# Patient Record
Sex: MALE | Race: OTHER | HISPANIC OR LATINO | URBAN - METROPOLITAN AREA
[De-identification: names, ages, dates, MRNs, and addresses within clinical notes are randomized per-mention and may not be internally consistent; named-entity substitution may affect disease eponyms.]

---

## 2017-04-24 ENCOUNTER — EMERGENCY (EMERGENCY)
Facility: HOSPITAL | Age: 67
LOS: 1 days | Discharge: ANOTHER HOSPITAL | End: 2017-04-24
Attending: EMERGENCY MEDICINE
Payer: MEDICARE

## 2017-04-24 VITALS
HEIGHT: 65 IN | SYSTOLIC BLOOD PRESSURE: 113 MMHG | RESPIRATION RATE: 20 BRPM | WEIGHT: 175.05 LBS | TEMPERATURE: 98 F | DIASTOLIC BLOOD PRESSURE: 69 MMHG | OXYGEN SATURATION: 98 % | HEART RATE: 110 BPM

## 2017-04-24 PROCEDURE — 99291 CRITICAL CARE FIRST HOUR: CPT

## 2017-04-24 PROCEDURE — 70450 CT HEAD/BRAIN W/O DYE: CPT | Mod: 26

## 2017-04-24 PROCEDURE — 72125 CT NECK SPINE W/O DYE: CPT | Mod: 26

## 2017-04-24 NOTE — ED PROVIDER NOTE - CRITICAL CARE PROVIDED
consult w/ pt's family directly relating to pts condition/consultation with other physicians/direct patient care (not related to procedure)/interpretation of diagnostic studies/documentation/additional history taking

## 2017-04-24 NOTE — ED PROVIDER NOTE - MEDICAL DECISION MAKING DETAILS
61 y/o with history of HTN, HLD presenting with head injury and LOC earlier today. Pt is well besides headache. Given age and mechanism of fall, will CT-scan head and re-assess. Pt is not on anticoagulation.

## 2017-04-24 NOTE — ED PROVIDER NOTE - OBJECTIVE STATEMENT
65 y/o M with PMHx of HLD, HTN presents to ED c/o headache s/p slip and fall down 7 steps. Pt confirms hitting the back of his head, notes pain to touch on back of head only and neck pain. Pt lost consciousness, and arose when wife splashed with water. Pt's wife attended to him right away when she heard him fall from afar. Pt was able to get up when assisted by wife  Denies nausea, vomiting or any other complaints. Pt takes 81mg of aspirin daily. NKDA. 65 y/o M with PMHx of HLD, HTN presents to ED c/o headache s/p slip and fall down 7 steps. Pt confirms hitting the back of his head, notes pain to touch on back of head only and neck pain. Pt lost consciousness, and arose when wife splashed with water. Pt's wife attended to him right away when she heard him fall from afar. Pt was able to get up when assisted by wife. Is ambulating in ED.  Denies nausea, vomiting or any other complaints. Pt takes 81mg of aspirin daily but no other blood thinners. NKDA.    Pt speaks limited English, understands that we offered interpretation services and stated his preference, in English, for his adult son to interpret for him.

## 2017-04-24 NOTE — ED PROVIDER NOTE - MUSCULOSKELETAL, MLM
Spine appears normal, range of motion is not limited, no muscle or joint tenderness. No c-spine tenderness

## 2017-04-24 NOTE — ED ADULT TRIAGE NOTE - CHIEF COMPLAINT QUOTE
pt tripped and fell down the stairs this afternoon at 230pm, positive LOC and swelling noted in back of head, pt presently ambulating steadily a&ox3

## 2017-04-24 NOTE — ED PROVIDER NOTE - PROGRESS NOTE DETAILS
Transfer to [place] for head bleed. On phone with [facility] center for Transfer to [place] for head bleed. Spoke c Dr. Cardenas and Dr. Graciela Rodríguez. Dx d/w pt via son, expressed understanding and consenting to [facility],.

## 2017-04-24 NOTE — ED PROVIDER NOTE - CARE PLAN
Principal Discharge DX:	Subarachnoid bleed  Secondary Diagnosis:	Subdural bleeding  Secondary Diagnosis:	Brain contusion, with LOC of 30 min or less, initial encounter

## 2017-04-25 ENCOUNTER — INPATIENT (INPATIENT)
Facility: HOSPITAL | Age: 67
LOS: 0 days | Discharge: HOME | DRG: 66 | End: 2017-04-26
Attending: SURGERY | Admitting: SURGERY
Payer: MEDICARE

## 2017-04-25 VITALS
TEMPERATURE: 99 F | HEART RATE: 101 BPM | RESPIRATION RATE: 18 BRPM | SYSTOLIC BLOOD PRESSURE: 150 MMHG | OXYGEN SATURATION: 95 % | DIASTOLIC BLOOD PRESSURE: 82 MMHG

## 2017-04-25 VITALS
SYSTOLIC BLOOD PRESSURE: 136 MMHG | TEMPERATURE: 99 F | HEART RATE: 109 BPM | DIASTOLIC BLOOD PRESSURE: 59 MMHG | OXYGEN SATURATION: 99 % | RESPIRATION RATE: 18 BRPM

## 2017-04-25 DIAGNOSIS — I62.00 NONTRAUMATIC SUBDURAL HEMORRHAGE, UNSPECIFIED: ICD-10-CM

## 2017-04-25 LAB
ALBUMIN SERPL ELPH-MCNC: 3.5 G/DL — SIGNIFICANT CHANGE UP (ref 3.5–5)
ALBUMIN SERPL ELPH-MCNC: 4 G/DL — SIGNIFICANT CHANGE UP (ref 3.3–5)
ALP SERPL-CCNC: 53 U/L — SIGNIFICANT CHANGE UP (ref 40–120)
ALP SERPL-CCNC: 59 U/L — SIGNIFICANT CHANGE UP (ref 40–120)
ALT FLD-CCNC: 15 U/L RC — SIGNIFICANT CHANGE UP (ref 10–45)
ALT FLD-CCNC: 23 U/L DA — SIGNIFICANT CHANGE UP (ref 10–60)
ANION GAP SERPL CALC-SCNC: 12 MMOL/L — SIGNIFICANT CHANGE UP (ref 5–17)
ANION GAP SERPL CALC-SCNC: 23 MMOL/L — HIGH (ref 5–17)
APTT BLD: 30.5 SEC — SIGNIFICANT CHANGE UP (ref 27.5–37.4)
APTT BLD: 31.7 SEC — SIGNIFICANT CHANGE UP (ref 27.5–37.4)
AST SERPL-CCNC: 24 U/L — SIGNIFICANT CHANGE UP (ref 10–40)
AST SERPL-CCNC: 24 U/L — SIGNIFICANT CHANGE UP (ref 10–40)
BASOPHILS # BLD AUTO: 0.1 K/UL — SIGNIFICANT CHANGE UP (ref 0–0.2)
BASOPHILS # BLD AUTO: 0.1 K/UL — SIGNIFICANT CHANGE UP (ref 0–0.2)
BASOPHILS NFR BLD AUTO: 0.6 % — SIGNIFICANT CHANGE UP (ref 0–2)
BASOPHILS NFR BLD AUTO: 0.7 % — SIGNIFICANT CHANGE UP (ref 0–2)
BILIRUB SERPL-MCNC: 0.4 MG/DL — SIGNIFICANT CHANGE UP (ref 0.2–1.2)
BILIRUB SERPL-MCNC: 0.5 MG/DL — SIGNIFICANT CHANGE UP (ref 0.2–1.2)
BLD GP AB SCN SERPL QL: NEGATIVE — SIGNIFICANT CHANGE UP
BUN SERPL-MCNC: 16 MG/DL — SIGNIFICANT CHANGE UP (ref 7–18)
BUN SERPL-MCNC: 16 MG/DL — SIGNIFICANT CHANGE UP (ref 7–23)
CALCIUM SERPL-MCNC: 8.2 MG/DL — LOW (ref 8.4–10.5)
CALCIUM SERPL-MCNC: 8.9 MG/DL — SIGNIFICANT CHANGE UP (ref 8.4–10.5)
CHLORIDE SERPL-SCNC: 101 MMOL/L — SIGNIFICANT CHANGE UP (ref 96–108)
CHLORIDE SERPL-SCNC: 106 MMOL/L — SIGNIFICANT CHANGE UP (ref 96–108)
CO2 SERPL-SCNC: 21 MMOL/L — LOW (ref 22–31)
CO2 SERPL-SCNC: 24 MMOL/L — SIGNIFICANT CHANGE UP (ref 22–31)
CREAT SERPL-MCNC: 1 MG/DL — SIGNIFICANT CHANGE UP (ref 0.5–1.3)
CREAT SERPL-MCNC: 1.29 MG/DL — SIGNIFICANT CHANGE UP (ref 0.5–1.3)
EOSINOPHIL # BLD AUTO: 0 K/UL — SIGNIFICANT CHANGE UP (ref 0–0.5)
EOSINOPHIL # BLD AUTO: 0 K/UL — SIGNIFICANT CHANGE UP (ref 0–0.5)
EOSINOPHIL NFR BLD AUTO: 0 % — SIGNIFICANT CHANGE UP (ref 0–6)
EOSINOPHIL NFR BLD AUTO: 0.1 % — SIGNIFICANT CHANGE UP (ref 0–6)
GLUCOSE SERPL-MCNC: 163 MG/DL — HIGH (ref 70–99)
GLUCOSE SERPL-MCNC: 178 MG/DL — HIGH (ref 70–99)
HCT VFR BLD CALC: 41.4 % — SIGNIFICANT CHANGE UP (ref 39–50)
HCT VFR BLD CALC: 44.9 % — SIGNIFICANT CHANGE UP (ref 39–50)
HGB BLD-MCNC: 14.3 G/DL — SIGNIFICANT CHANGE UP (ref 13–17)
HGB BLD-MCNC: 15.3 G/DL — SIGNIFICANT CHANGE UP (ref 13–17)
INR BLD: 1.07 RATIO — SIGNIFICANT CHANGE UP (ref 0.88–1.16)
INR BLD: 1.09 RATIO — SIGNIFICANT CHANGE UP (ref 0.88–1.16)
LYMPHOCYTES # BLD AUTO: 1.3 K/UL — SIGNIFICANT CHANGE UP (ref 1–3.3)
LYMPHOCYTES # BLD AUTO: 1.6 K/UL — SIGNIFICANT CHANGE UP (ref 1–3.3)
LYMPHOCYTES # BLD AUTO: 12.3 % — LOW (ref 13–44)
LYMPHOCYTES # BLD AUTO: 15.5 % — SIGNIFICANT CHANGE UP (ref 13–44)
MCHC RBC-ENTMCNC: 29.6 PG — SIGNIFICANT CHANGE UP (ref 27–34)
MCHC RBC-ENTMCNC: 29.9 PG — SIGNIFICANT CHANGE UP (ref 27–34)
MCHC RBC-ENTMCNC: 34 GM/DL — SIGNIFICANT CHANGE UP (ref 32–36)
MCHC RBC-ENTMCNC: 34.5 GM/DL — SIGNIFICANT CHANGE UP (ref 32–36)
MCV RBC AUTO: 86.6 FL — SIGNIFICANT CHANGE UP (ref 80–100)
MCV RBC AUTO: 87.2 FL — SIGNIFICANT CHANGE UP (ref 80–100)
MONOCYTES # BLD AUTO: 0.5 K/UL — SIGNIFICANT CHANGE UP (ref 0–0.9)
MONOCYTES # BLD AUTO: 0.6 K/UL — SIGNIFICANT CHANGE UP (ref 0–0.9)
MONOCYTES NFR BLD AUTO: 4.8 % — SIGNIFICANT CHANGE UP (ref 2–14)
MONOCYTES NFR BLD AUTO: 6.2 % — SIGNIFICANT CHANGE UP (ref 2–14)
NEUTROPHILS # BLD AUTO: 7.9 K/UL — HIGH (ref 1.8–7.4)
NEUTROPHILS # BLD AUTO: 8.8 K/UL — HIGH (ref 1.8–7.4)
NEUTROPHILS NFR BLD AUTO: 77.7 % — HIGH (ref 43–77)
NEUTROPHILS NFR BLD AUTO: 82.1 % — HIGH (ref 43–77)
PLATELET # BLD AUTO: 162 K/UL — SIGNIFICANT CHANGE UP (ref 150–400)
PLATELET # BLD AUTO: 195 K/UL — SIGNIFICANT CHANGE UP (ref 150–400)
POTASSIUM SERPL-MCNC: 3.9 MMOL/L — SIGNIFICANT CHANGE UP (ref 3.5–5.3)
POTASSIUM SERPL-MCNC: 3.9 MMOL/L — SIGNIFICANT CHANGE UP (ref 3.5–5.3)
POTASSIUM SERPL-SCNC: 3.9 MMOL/L — SIGNIFICANT CHANGE UP (ref 3.5–5.3)
POTASSIUM SERPL-SCNC: 3.9 MMOL/L — SIGNIFICANT CHANGE UP (ref 3.5–5.3)
PROT SERPL-MCNC: 7.2 G/DL — SIGNIFICANT CHANGE UP (ref 6–8.3)
PROT SERPL-MCNC: 7.5 G/DL — SIGNIFICANT CHANGE UP (ref 6–8.3)
PROTHROM AB SERPL-ACNC: 11.7 SEC — SIGNIFICANT CHANGE UP (ref 9.8–12.7)
PROTHROM AB SERPL-ACNC: 11.9 SEC — SIGNIFICANT CHANGE UP (ref 9.8–12.7)
RBC # BLD: 4.78 M/UL — SIGNIFICANT CHANGE UP (ref 4.2–5.8)
RBC # BLD: 5.15 M/UL — SIGNIFICANT CHANGE UP (ref 4.2–5.8)
RBC # FLD: 11.7 % — SIGNIFICANT CHANGE UP (ref 10.3–14.5)
RBC # FLD: 11.8 % — SIGNIFICANT CHANGE UP (ref 10.3–14.5)
RH IG SCN BLD-IMP: POSITIVE — SIGNIFICANT CHANGE UP
SODIUM SERPL-SCNC: 142 MMOL/L — SIGNIFICANT CHANGE UP (ref 135–145)
SODIUM SERPL-SCNC: 145 MMOL/L — SIGNIFICANT CHANGE UP (ref 135–145)
WBC # BLD: 10.1 K/UL — SIGNIFICANT CHANGE UP (ref 3.8–10.5)
WBC # BLD: 10.7 K/UL — HIGH (ref 3.8–10.5)
WBC # FLD AUTO: 10.1 K/UL — SIGNIFICANT CHANGE UP (ref 3.8–10.5)
WBC # FLD AUTO: 10.7 K/UL — HIGH (ref 3.8–10.5)

## 2017-04-25 PROCEDURE — 70450 CT HEAD/BRAIN W/O DYE: CPT | Mod: 26,77

## 2017-04-25 PROCEDURE — 99285 EMERGENCY DEPT VISIT HI MDM: CPT | Mod: 25

## 2017-04-25 PROCEDURE — 86900 BLOOD TYPING SEROLOGIC ABO: CPT

## 2017-04-25 PROCEDURE — 99053 MED SERV 10PM-8AM 24 HR FAC: CPT

## 2017-04-25 PROCEDURE — 99291 CRITICAL CARE FIRST HOUR: CPT | Mod: 25

## 2017-04-25 PROCEDURE — 80053 COMPREHEN METABOLIC PANEL: CPT

## 2017-04-25 PROCEDURE — 86850 RBC ANTIBODY SCREEN: CPT

## 2017-04-25 PROCEDURE — 85730 THROMBOPLASTIN TIME PARTIAL: CPT

## 2017-04-25 PROCEDURE — 86901 BLOOD TYPING SEROLOGIC RH(D): CPT

## 2017-04-25 PROCEDURE — 70450 CT HEAD/BRAIN W/O DYE: CPT

## 2017-04-25 PROCEDURE — 85027 COMPLETE CBC AUTOMATED: CPT

## 2017-04-25 PROCEDURE — 85610 PROTHROMBIN TIME: CPT

## 2017-04-25 PROCEDURE — 72125 CT NECK SPINE W/O DYE: CPT

## 2017-04-25 PROCEDURE — 70450 CT HEAD/BRAIN W/O DYE: CPT | Mod: 26

## 2017-04-25 RX ORDER — ACETAMINOPHEN 500 MG
650 TABLET ORAL ONCE
Qty: 0 | Refills: 0 | Status: COMPLETED | OUTPATIENT
Start: 2017-04-25 | End: 2017-04-25

## 2017-04-25 RX ORDER — INSULIN LISPRO 100/ML
VIAL (ML) SUBCUTANEOUS
Qty: 0 | Refills: 0 | Status: DISCONTINUED | OUTPATIENT
Start: 2017-04-25 | End: 2017-04-26

## 2017-04-25 RX ADMIN — Medication 650 MILLIGRAM(S): at 23:01

## 2017-04-25 RX ADMIN — Medication 650 MILLIGRAM(S): at 14:09

## 2017-04-25 NOTE — ED ADULT NURSE NOTE - ED STAT RN HANDOFF DETAILS
pt.is alert and  oriented x 3 denies  pain.  transfer to [facility] ER for  subdural  hematoma.martinez to  left  ac#[id number]  intact.left  in the ed  in stable  condition via  [facility].not   in  distress

## 2017-04-25 NOTE — ED PROVIDER NOTE - PROGRESS NOTE DETAILS
Attending Marcos: d/w neurosurgery paresh witt Attending Marcos: d/w neurosurgery recommend 12 hour repeat scan

## 2017-04-25 NOTE — ED PROVIDER NOTE - OBJECTIVE STATEMENT
Attending Rodríguez: 67 y/o male transferred from [facility] after mechanical fall. pt tripped down some stairs and landed on his back. +LOC. pt on aspirin. event happened at approximately 2pm. went to [place] where CT head showed multiple small subdural. pt complaining of headache and dizziness. no abdominal pain, sob or difficulty breathing. no blurry vision

## 2017-04-25 NOTE — ED ADULT NURSE NOTE - OBJECTIVE STATEMENT
65 y/o male A&O x 3 transferred from [place] s/p mechanical fall down 7 stairs this afternoon. Pt states he tripped and fell down the stairs. P wife reports +LOC for < 2 minutes and awoke after splashing water to his face. Pt confirmed multiple subdurals. Pt lungs clear bilaterally. Skin warm, dry. PERRL. No neuro deficits noted. Abdomen non-distended, non-tender. Gross motor intact.

## 2017-04-25 NOTE — ED ADULT NURSE REASSESSMENT NOTE - NS ED NURSE REASSESS COMMENT FT1
patient is resting in the room waiting for ct scan. perrl. equal strength and sensation in all extremities. patient denies any complaints. vss/nad. will continue to monitor.

## 2017-04-25 NOTE — ED PROVIDER NOTE - MEDICAL DECISION MAKING DETAILS
Attending Marcos: 67 y/o male on aspirin transferred after fall with subdural. pt awake and alert. will monitor BP, d/w neurosurgery and re-eval.

## 2017-04-25 NOTE — ED ADULT NURSE NOTE - OBJECTIVE STATEMENT
presented to ed with s/p fall pt.slipped and fall down 7 steps onset yesterday at 0230.c/o swelling at  the back of his head..+ LOC.c/o headache.ct head done  result small subarachnoid hemorrhage in the rt.frontal area and  left parietal..bld.drawn and  sent to lab

## 2017-04-25 NOTE — ED PROVIDER NOTE - PHYSICAL EXAMINATION
Attending Rodríguez: Gen: NAD, heent: posterior hematoma, eomi, perrla, mmm, op pink, uvula midline, neck; nttp, no nuchal rigidity, chest: nttp, no crepitus, cv: rrr, no murmurs, lungs: ctab, abd: soft, nontender, nondistended, no peritoneal signs, +BS, no guarding, ext: wwp, neg homans, skin: no rash, neuro: awake and alert, following commands, speech clear, sensation and strength intact, no focal deficits

## 2017-04-25 NOTE — H&P ADULT. - HISTORY OF PRESENT ILLNESS
66M s/p trip down 4 steps, fell on back and hit head. States he lost balance. +LOC. On aspirin at home. CT Head/c-spine significant for subdural/subarachnoid hemorrhage. Repeat head CT 4 hours later stable.

## 2017-04-26 VITALS
HEART RATE: 77 BPM | OXYGEN SATURATION: 98 % | DIASTOLIC BLOOD PRESSURE: 87 MMHG | TEMPERATURE: 98 F | RESPIRATION RATE: 18 BRPM | SYSTOLIC BLOOD PRESSURE: 158 MMHG

## 2017-04-26 LAB
ANION GAP SERPL CALC-SCNC: 17 MMOL/L — SIGNIFICANT CHANGE UP (ref 5–17)
BUN SERPL-MCNC: 13 MG/DL — SIGNIFICANT CHANGE UP (ref 7–23)
CALCIUM SERPL-MCNC: 8.7 MG/DL — SIGNIFICANT CHANGE UP (ref 8.4–10.5)
CHLORIDE SERPL-SCNC: 98 MMOL/L — SIGNIFICANT CHANGE UP (ref 96–108)
CO2 SERPL-SCNC: 24 MMOL/L — SIGNIFICANT CHANGE UP (ref 22–31)
CREAT SERPL-MCNC: 0.84 MG/DL — SIGNIFICANT CHANGE UP (ref 0.5–1.3)
GLUCOSE SERPL-MCNC: 123 MG/DL — HIGH (ref 70–99)
HCT VFR BLD CALC: 39.4 % — SIGNIFICANT CHANGE UP (ref 39–50)
HGB BLD-MCNC: 13.3 G/DL — SIGNIFICANT CHANGE UP (ref 13–17)
MCHC RBC-ENTMCNC: 29 PG — SIGNIFICANT CHANGE UP (ref 27–34)
MCHC RBC-ENTMCNC: 33.8 GM/DL — SIGNIFICANT CHANGE UP (ref 32–36)
MCV RBC AUTO: 86 FL — SIGNIFICANT CHANGE UP (ref 80–100)
PLATELET # BLD AUTO: 154 K/UL — SIGNIFICANT CHANGE UP (ref 150–400)
POTASSIUM SERPL-MCNC: 3.3 MMOL/L — LOW (ref 3.5–5.3)
POTASSIUM SERPL-SCNC: 3.3 MMOL/L — LOW (ref 3.5–5.3)
RBC # BLD: 4.58 M/UL — SIGNIFICANT CHANGE UP (ref 4.2–5.8)
RBC # FLD: 12.4 % — SIGNIFICANT CHANGE UP (ref 10.3–14.5)
SODIUM SERPL-SCNC: 139 MMOL/L — SIGNIFICANT CHANGE UP (ref 135–145)
WBC # BLD: 7.04 K/UL — SIGNIFICANT CHANGE UP (ref 3.8–10.5)
WBC # FLD AUTO: 7.04 K/UL — SIGNIFICANT CHANGE UP (ref 3.8–10.5)

## 2017-04-26 PROCEDURE — 80048 BASIC METABOLIC PNL TOTAL CA: CPT

## 2017-04-26 PROCEDURE — 85027 COMPLETE CBC AUTOMATED: CPT

## 2017-04-26 PROCEDURE — 85730 THROMBOPLASTIN TIME PARTIAL: CPT

## 2017-04-26 PROCEDURE — 86850 RBC ANTIBODY SCREEN: CPT

## 2017-04-26 PROCEDURE — 86901 BLOOD TYPING SEROLOGIC RH(D): CPT

## 2017-04-26 PROCEDURE — 80053 COMPREHEN METABOLIC PANEL: CPT

## 2017-04-26 PROCEDURE — 99285 EMERGENCY DEPT VISIT HI MDM: CPT | Mod: 25

## 2017-04-26 PROCEDURE — 85610 PROTHROMBIN TIME: CPT

## 2017-04-26 PROCEDURE — 86900 BLOOD TYPING SEROLOGIC ABO: CPT

## 2017-04-26 PROCEDURE — 70450 CT HEAD/BRAIN W/O DYE: CPT

## 2017-04-26 RX ORDER — POTASSIUM CHLORIDE 20 MEQ
40 PACKET (EA) ORAL ONCE
Qty: 0 | Refills: 0 | Status: COMPLETED | OUTPATIENT
Start: 2017-04-26 | End: 2017-04-26

## 2017-04-26 RX ORDER — ACETAMINOPHEN 500 MG
650 TABLET ORAL ONCE
Qty: 0 | Refills: 0 | Status: COMPLETED | OUTPATIENT
Start: 2017-04-26 | End: 2017-04-26

## 2017-04-26 RX ADMIN — Medication 650 MILLIGRAM(S): at 07:51

## 2017-04-26 RX ADMIN — Medication 40 MILLIEQUIVALENT(S): at 11:32

## 2017-04-26 RX ADMIN — Medication 650 MILLIGRAM(S): at 10:11

## 2017-04-26 RX ADMIN — Medication 650 MILLIGRAM(S): at 10:57

## 2017-04-26 NOTE — DISCHARGE NOTE ADULT - PLAN OF CARE
Return to normal ADLs with adequate pain control FOLLOW-UP:  1. Please call to make a follow-up appointment within two weeks of discharge with Dr. Goins.  2. Please follow up with your primary care physician in one week regarding your hospitalization.  3. Please call the trauma office if you have any questions or concerns about your hospitalization Dr. Silva [phone]  DIET: Return to your usual diet.  WOUND CARE:   BATHING: You may shower and/or sponge bathe.-Do not submerge wound underwate or scrub incision sites. Let soapy water run over them and pat dry.  Leave the white steri strips in place, they will fall off on their own in approximately 5-7 days.  ACTIVITY: No straining or heavy lifting over 15 pounds for the next two weeks. Otherwise, you may return to your usual level of physical activity. If you are taking narcotic pain medication (such as Percocet), do NOT drive a car, operate machinery or make important decisions.    NOTIFY YOUR SURGEON IF: You have any bleeding that does not stop, any pus draining from your wound, any fever (over 100.4 F) or chills, persistent nausea/vomiting, persistent diarrhea, or if your pain is not controlled on your discharge pain medications. FOLLOW-UP:  1. Please call to make a follow-up appointment within two weeks of discharge with Dr. Goins. (NEUROSURGERY) Please discuss restarting aspirin with them.   2. Please follow up with your primary care physician in one week regarding your hospitalization.  3. Please call the trauma office if you have any questions or concerns about your hospitalization Dr. Silva [phone]  DIET: Return to your usual diet.  WOUND CARE:   BATHING: You may shower and/or sponge bathe.-Do not submerge wound underwate or scrub incision sites. Let soapy water run over them and pat dry.   ACTIVITY: No straining or heavy lifting over 15 pounds for the next two weeks. Otherwise, you may return to your usual level of physical activity. If you are taking narcotic pain medication (such as Percocet), do NOT drive a car, operate machinery or make important decisions.    NOTIFY YOUR SURGEON IF: You have any bleeding that does not stop, any pus draining from your wound, any fever (over 100.4 F) or chills, persistent nausea/vomiting, persistent diarrhea, or if your pain is not controlled on your discharge pain medications.

## 2017-04-26 NOTE — DISCHARGE NOTE ADULT - HOSPITAL COURSE
66M s/p trip down 4 steps, fell on back and hit head. States he lost balance. +LOC. On aspirin at home. CT Head/c-spine significant for subdural/subarachnoid hemorrhage. Repeat head CT 4 hours later stable.    The patient was admitted to the surgical service and transferred to a surgical floor for monitoring. The patinet reported some nausea and vomiting, but subsequent CT was also stable. N/V resolved and the patient was discharged in stable condition once ambulating, a regular diet was tolerated, and pain was controlled with PO medications. 66M s/p trip down 4 steps, fell on back and hit head. States he lost balance. +LOC. On aspirin at home. CT Head/c-spine significant for subdural/subarachnoid hemorrhage. Repeat head CT 4 hours later stable.    The patient was admitted to the surgical service and transferred to a surgical floor for monitoring. The patinet reported some nausea and vomiting, but subsequent CT was also stable. N/V resolved and the patient was discharged in stable condition once ambulating, a regular diet was tolerated, and pain was controlled with PO medications. They were instructed to followup with Neurosurgery and to discuss restarting aspirin with them.

## 2017-04-26 NOTE — DISCHARGE NOTE ADULT - CARE PROVIDERS DIRECT ADDRESSES
[email or web address]

## 2017-04-26 NOTE — PHYSICAL THERAPY INITIAL EVALUATION ADULT - ADDITIONAL COMMENTS
4/15/17 CT head Stable right frontal subdural hematoma with subjacent hemorrhagic contusion. Stable right frontal and left parietal subarachnoid hemorrhage. No worsening mass effect or midline shift.  No significant interval change in the appearance of a right frontal hemorrhagic contusion with adjacent subdural hematoma compared with 3:20 AM. No mass effect or midline shift. Unchanged right frontal and left parietal subarachnoid hemorrhage. Stable sequela of trauma in the left parietal scalp.

## 2017-04-26 NOTE — DISCHARGE NOTE ADULT - PATIENT PORTAL LINK FT
“You can access the FollowHealth Patient Portal, offered by [facility], by registering with the following website: [email or web address]”

## 2017-04-26 NOTE — DISCHARGE NOTE ADULT - CARE PROVIDER_API CALL
Deonte Goins), Neurological Surgery  [address] [place] [address]  Phone: [phone]  Fax: [phone]

## 2017-04-26 NOTE — DISCHARGE NOTE ADULT - NS AS DC STROKE ED MATERIALS
Stroke Warning Signs and Symptoms/Risk Factors for Stroke/Stroke Education Booklet/Prescribed Medications/Need for Followup After Discharge/Call 911 for Stroke

## 2017-04-26 NOTE — DISCHARGE NOTE ADULT - CARE PLAN
Principal Discharge DX:	Fall  Goal:	Return to normal ADLs with adequate pain control  Instructions for follow-up, activity and diet:	FOLLOW-UP:  1. Please call to make a follow-up appointment within two weeks of discharge with Dr. Goins.  2. Please follow up with your primary care physician in one week regarding your hospitalization.  3. Please call the trauma office if you have any questions or concerns about your hospitalization Dr. Silva [phone]  DIET: Return to your usual diet.  WOUND CARE:   BATHING: You may shower and/or sponge bathe.-Do not submerge wound underwate or scrub incision sites. Let soapy water run over them and pat dry.  Leave the white steri strips in place, they will fall off on their own in approximately 5-7 days.  ACTIVITY: No straining or heavy lifting over 15 pounds for the next two weeks. Otherwise, you may return to your usual level of physical activity. If you are taking narcotic pain medication (such as Percocet), do NOT drive a car, operate machinery or make important decisions.    NOTIFY YOUR SURGEON IF: You have any bleeding that does not stop, any pus draining from your wound, any fever (over 100.4 F) or chills, persistent nausea/vomiting, persistent diarrhea, or if your pain is not controlled on your discharge pain medications.  Secondary Diagnosis:	SDH (subdural hematoma)  Secondary Diagnosis:	SAH (subarachnoid hemorrhage) Principal Discharge DX:	Fall  Goal:	Return to normal ADLs with adequate pain control  Instructions for follow-up, activity and diet:	FOLLOW-UP:  1. Please call to make a follow-up appointment within two weeks of discharge with Dr. Goins. (NEUROSURGERY) Please discuss restarting aspirin with them.   2. Please follow up with your primary care physician in one week regarding your hospitalization.  3. Please call the trauma office if you have any questions or concerns about your hospitalization Dr. Silva [phone]  DIET: Return to your usual diet.  WOUND CARE:   BATHING: You may shower and/or sponge bathe.-Do not submerge wound underwate or scrub incision sites. Let soapy water run over them and pat dry.   ACTIVITY: No straining or heavy lifting over 15 pounds for the next two weeks. Otherwise, you may return to your usual level of physical activity. If you are taking narcotic pain medication (such as Percocet), do NOT drive a car, operate machinery or make important decisions.    NOTIFY YOUR SURGEON IF: You have any bleeding that does not stop, any pus draining from your wound, any fever (over 100.4 F) or chills, persistent nausea/vomiting, persistent diarrhea, or if your pain is not controlled on your discharge pain medications.  Secondary Diagnosis:	SDH (subdural hematoma)  Secondary Diagnosis:	SAH (subarachnoid hemorrhage) Principal Discharge DX:	Fall  Goal:	Return to normal ADLs with adequate pain control  Instructions for follow-up, activity and diet:	FOLLOW-UP:  1. Please call to make a follow-up appointment within two weeks of discharge with Dr. Goins. (NEUROSURGERY) Please discuss restarting aspirin with them.   2. Please follow up with your primary care physician in one week regarding your hospitalization.  3. Please call the trauma office if you have any questions or concerns about your hospitalization Dr. Silva [phone]  DIET: Return to your usual diet.  WOUND CARE:   BATHING: You may shower and/or sponge bathe.-Do not submerge wound underwate or scrub incision sites. Let soapy water run over them and pat dry.   ACTIVITY: No straining or heavy lifting over 15 pounds for the next two weeks. Otherwise, you may return to your usual level of physical activity. If you are taking narcotic pain medication (such as Percocet), do NOT drive a car, operate machinery or make important decisions.    NOTIFY YOUR SURGEON IF: You have any bleeding that does not stop, any pus draining from your wound, any fever (over 100.4 F) or chills, persistent nausea/vomiting, persistent diarrhea, or if your pain is not controlled on your discharge pain medications.  Secondary Diagnosis:	SDH (subdural hematoma)  Secondary Diagnosis:	SAH (subarachnoid hemorrhage)

## 2017-04-26 NOTE — DISCHARGE NOTE ADULT - MEDICATION SUMMARY - MEDICATIONS TO TAKE
I will START or STAY ON the medications listed below when I get home from the hospital:    metFORMIN  --  by mouth   -- Indication: For Diabetes

## 2017-04-26 NOTE — DISCHARGE NOTE ADULT - MEDICATION SUMMARY - MEDICATIONS TO STOP TAKING
I will STOP taking the medications listed below when I get home from the hospital:    aspirin  --  by mouth

## 2017-04-26 NOTE — PHYSICAL THERAPY INITIAL EVALUATION ADULT - PERTINENT HX OF CURRENT PROBLEM, REHAB EVAL
Pt is a 66 year old male admitted to [facility] on 4/25/17 for Pt is a 66 year old male admitted to [facility] on 4/25/17 for Mechanical fall down 4 steps. See below in additional comments

## 2017-04-28 DIAGNOSIS — S06.2X1A DIFFUSE TRAUMATIC BRAIN INJURY WITH LOSS OF CONSCIOUSNESS OF 30 MINUTES OR LESS, INITIAL ENCOUNTER: ICD-10-CM
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
## 2017-04-29 ENCOUNTER — EMERGENCY (EMERGENCY)
Facility: HOSPITAL | Age: 67
LOS: 1 days | Discharge: HOME | End: 2017-04-29
Attending: EMERGENCY MEDICINE
Payer: MEDICARE

## 2017-04-29 VITALS
TEMPERATURE: 98 F | HEART RATE: 8 BPM | RESPIRATION RATE: 16 BRPM | OXYGEN SATURATION: 99 % | DIASTOLIC BLOOD PRESSURE: 83 MMHG | SYSTOLIC BLOOD PRESSURE: 121 MMHG

## 2017-04-29 VITALS
OXYGEN SATURATION: 99 % | DIASTOLIC BLOOD PRESSURE: 68 MMHG | SYSTOLIC BLOOD PRESSURE: 103 MMHG | WEIGHT: 177.91 LBS | RESPIRATION RATE: 16 BRPM | HEART RATE: 96 BPM | TEMPERATURE: 99 F | HEIGHT: 66 IN
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
## 2017-04-29 DIAGNOSIS — E78.5 HYPERLIPIDEMIA, UNSPECIFIED: ICD-10-CM
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
## 2017-04-29 DIAGNOSIS — S06.5X0D TRAUMATIC SUBDURAL HEMORRHAGE WITHOUT LOSS OF CONSCIOUSNESS, SUBSEQUENT ENCOUNTER: ICD-10-CM

## 2017-04-29 DIAGNOSIS — I10 ESSENTIAL (PRIMARY) HYPERTENSION: ICD-10-CM

## 2017-04-29 PROCEDURE — 99284 EMERGENCY DEPT VISIT MOD MDM: CPT | Mod: 25

## 2017-04-29 PROCEDURE — 70450 CT HEAD/BRAIN W/O DYE: CPT | Mod: 26

## 2017-04-29 PROCEDURE — 70450 CT HEAD/BRAIN W/O DYE: CPT

## 2017-04-29 PROCEDURE — 99285 EMERGENCY DEPT VISIT HI MDM: CPT

## 2017-04-29 RX ORDER — ONDANSETRON 8 MG/1
1 TABLET, FILM COATED ORAL
Qty: 9 | Refills: 0
Start: 2017-04-29 | End: 2017-05-02

## 2017-04-29 RX ORDER — ONDANSETRON 8 MG/1
8 TABLET, FILM COATED ORAL ONCE
Qty: 0 | Refills: 0 | Status: COMPLETED | OUTPATIENT
Start: 2017-04-29 | End: 2017-04-29

## 2017-04-29 RX ADMIN — ONDANSETRON 8 MILLIGRAM(S): 8 TABLET, FILM COATED ORAL at 12:39

## 2017-04-29 NOTE — ED PROVIDER NOTE - NS ED MD SCRIBE ATTENDING SCRIBE SECTIONS
DISPOSITION/REVIEW OF SYSTEMS/PHYSICAL EXAM/HIV/PAST MEDICAL/SURGICAL/SOCIAL HISTORY/HISTORY OF PRESENT ILLNESS/VITAL SIGNS( Pullset)

## 2017-04-29 NOTE — ED PROVIDER NOTE - OBJECTIVE STATEMENT
65 y/o M pt w/ PMHx of HLD and HTN presents to the ED c/o nausea and headache. Pt was seen here April 24th for headache s/p slip and fall; was diagnosed w/ subdural hematoma and transferred. Pt noticed that since discharge he has had persistent nausea and headache. Pt here today to check it out. Pt has not been taking his ASA and has a follow up w/ neurosurgeon next week. Denies severe headache, LOC, vomiting or any other complaints. NKDA. Pt w/ normal gait.

## 2017-04-29 NOTE — ED ADULT TRIAGE NOTE - CHIEF COMPLAINT QUOTE
As per pt's son, "Last monday, he fell down the stairs and he was brought in and they did a CT and found some intracranial bleeding. He got discharged but has had a headache since yesterday. He is nauseous and dizzy and has blurry vision since last night"

## 2017-04-29 NOTE — ED PROVIDER NOTE - MEDICAL DECISION MAKING DETAILS
Pt w/ headache and some nausea s/p subdural hematoma. Will evaluate for interval change. Likely post concussive headache. Will give Zofran as needed and continue w/ Tylenol for pain. Pt w/ headache and some nausea s/p subdural hematoma pt notes exact same symptoms since the injury, no changes. Will evaluate for interval change. Likely post concussive headache. Will give Zofran as needed and continue w/ Tylenol for pain.

## 2017-11-08 RX ORDER — ASPIRIN/CALCIUM CARB/MAGNESIUM 324 MG
0 TABLET ORAL
Qty: 0 | Refills: 0 | COMMUNITY

## 2020-01-08 NOTE — ED ADULT NURSE NOTE - DATE/TIME OF ACCEPTANCE
Pt called back stating that she is needing a root canal as well as needs to have a wisdom tooth pulled. Pt is unsure regarding the anesthesia her dentist would like to use. Discussed local anesthetic should not have epinephine in combination with lidocaine/novacaine. Also pt aware that she can have xrays provided she is adequately shielded.   Pt has appt with dentist on 1.16.2020 to discuss and potentially start process. Notified patient that her dentist may require a letter and she needs to obtain information regarding the anesthesia he/she wants to utilize.    Pt will call back with any questions or further needs.   29-Apr-2017 11:21

## 2022-05-23 NOTE — PHYSICAL THERAPY INITIAL EVALUATION ADULT - PATIENT PROFILE REVIEW, REHAB EVAL
Pt. Cleared for admission to the ICU.  Pt. Report called to ICU-rn and updated at the bedside.  Pt. Aware of transfer and free from question.  Pt. Transferred to the ICU on cart, on monitor accompanied by ICU -rn and myself.  Pt. Assisted to ICU bed and care transferred to ICU nurse.  Pt. Transferred to the ICU with bags of close., C-PAP maching, and cell phone in hand.    yes

## 2023-12-10 ENCOUNTER — INPATIENT (INPATIENT)
Facility: HOSPITAL | Age: 73
LOS: 6 days | Discharge: HOME HEALTH | DRG: 867 | End: 2023-12-17
Attending: INTERNAL MEDICINE | Admitting: HOSPITALIST
Payer: COMMERCIAL

## 2023-12-10 VITALS
HEART RATE: 86 BPM | RESPIRATION RATE: 16 BRPM | HEIGHT: 66 IN | TEMPERATURE: 100 F | SYSTOLIC BLOOD PRESSURE: 101 MMHG | WEIGHT: 164.91 LBS | DIASTOLIC BLOOD PRESSURE: 66 MMHG | OXYGEN SATURATION: 97 %

## 2023-12-10 LAB
ALBUMIN SERPL ELPH-MCNC: 3.5 G/DL — SIGNIFICANT CHANGE UP (ref 3.3–5)
ALBUMIN SERPL ELPH-MCNC: 3.5 G/DL — SIGNIFICANT CHANGE UP (ref 3.3–5)
ALP SERPL-CCNC: 40 U/L — SIGNIFICANT CHANGE UP (ref 40–120)
ALP SERPL-CCNC: 40 U/L — SIGNIFICANT CHANGE UP (ref 40–120)
ALT FLD-CCNC: 221 U/L — HIGH (ref 10–45)
ALT FLD-CCNC: 221 U/L — HIGH (ref 10–45)
ANION GAP SERPL CALC-SCNC: 12 MMOL/L — SIGNIFICANT CHANGE UP (ref 5–17)
ANION GAP SERPL CALC-SCNC: 12 MMOL/L — SIGNIFICANT CHANGE UP (ref 5–17)
APTT BLD: 39.8 SEC — HIGH (ref 24.5–35.6)
APTT BLD: 39.8 SEC — HIGH (ref 24.5–35.6)
AST SERPL-CCNC: 299 U/L — HIGH (ref 10–40)
AST SERPL-CCNC: 299 U/L — HIGH (ref 10–40)
BASE EXCESS BLDV CALC-SCNC: 2.4 MMOL/L — SIGNIFICANT CHANGE UP (ref -2–3)
BASE EXCESS BLDV CALC-SCNC: 2.4 MMOL/L — SIGNIFICANT CHANGE UP (ref -2–3)
BILIRUB DIRECT SERPL-MCNC: 0.2 MG/DL — SIGNIFICANT CHANGE UP (ref 0–0.3)
BILIRUB DIRECT SERPL-MCNC: 0.2 MG/DL — SIGNIFICANT CHANGE UP (ref 0–0.3)
BILIRUB INDIRECT FLD-MCNC: 0.1 MG/DL — LOW (ref 0.2–1)
BILIRUB INDIRECT FLD-MCNC: 0.1 MG/DL — LOW (ref 0.2–1)
BILIRUB SERPL-MCNC: 0.3 MG/DL — SIGNIFICANT CHANGE UP (ref 0.2–1.2)
BUN SERPL-MCNC: 44 MG/DL — HIGH (ref 7–23)
BUN SERPL-MCNC: 44 MG/DL — HIGH (ref 7–23)
CA-I SERPL-SCNC: 1.07 MMOL/L — LOW (ref 1.15–1.33)
CA-I SERPL-SCNC: 1.07 MMOL/L — LOW (ref 1.15–1.33)
CALCIUM SERPL-MCNC: 8.2 MG/DL — LOW (ref 8.4–10.5)
CALCIUM SERPL-MCNC: 8.2 MG/DL — LOW (ref 8.4–10.5)
CHLORIDE BLDV-SCNC: 103 MMOL/L — SIGNIFICANT CHANGE UP (ref 96–108)
CHLORIDE BLDV-SCNC: 103 MMOL/L — SIGNIFICANT CHANGE UP (ref 96–108)
CHLORIDE SERPL-SCNC: 101 MMOL/L — SIGNIFICANT CHANGE UP (ref 96–108)
CHLORIDE SERPL-SCNC: 101 MMOL/L — SIGNIFICANT CHANGE UP (ref 96–108)
CO2 BLDV-SCNC: 30 MMOL/L — HIGH (ref 22–26)
CO2 BLDV-SCNC: 30 MMOL/L — HIGH (ref 22–26)
CO2 SERPL-SCNC: 27 MMOL/L — SIGNIFICANT CHANGE UP (ref 22–31)
CO2 SERPL-SCNC: 27 MMOL/L — SIGNIFICANT CHANGE UP (ref 22–31)
CREAT SERPL-MCNC: 3.5 MG/DL — HIGH (ref 0.5–1.3)
CREAT SERPL-MCNC: 3.5 MG/DL — HIGH (ref 0.5–1.3)
EGFR: 18 ML/MIN/1.73M2 — LOW
EGFR: 18 ML/MIN/1.73M2 — LOW
FLUAV AG NPH QL: SIGNIFICANT CHANGE UP
FLUAV AG NPH QL: SIGNIFICANT CHANGE UP
FLUBV AG NPH QL: SIGNIFICANT CHANGE UP
FLUBV AG NPH QL: SIGNIFICANT CHANGE UP
GAS PNL BLDV: 136 MMOL/L — SIGNIFICANT CHANGE UP (ref 136–145)
GAS PNL BLDV: 136 MMOL/L — SIGNIFICANT CHANGE UP (ref 136–145)
GAS PNL BLDV: SIGNIFICANT CHANGE UP
GAS PNL BLDV: SIGNIFICANT CHANGE UP
GLUCOSE BLDV-MCNC: 131 MG/DL — HIGH (ref 70–99)
GLUCOSE BLDV-MCNC: 131 MG/DL — HIGH (ref 70–99)
GLUCOSE SERPL-MCNC: 137 MG/DL — HIGH (ref 70–99)
GLUCOSE SERPL-MCNC: 137 MG/DL — HIGH (ref 70–99)
HCO3 BLDV-SCNC: 29 MMOL/L — SIGNIFICANT CHANGE UP (ref 22–29)
HCO3 BLDV-SCNC: 29 MMOL/L — SIGNIFICANT CHANGE UP (ref 22–29)
HCT VFR BLD CALC: 42 % — SIGNIFICANT CHANGE UP (ref 39–50)
HCT VFR BLD CALC: 42 % — SIGNIFICANT CHANGE UP (ref 39–50)
HCT VFR BLDA CALC: 41 % — SIGNIFICANT CHANGE UP (ref 39–51)
HCT VFR BLDA CALC: 41 % — SIGNIFICANT CHANGE UP (ref 39–51)
HGB BLD CALC-MCNC: 13.7 G/DL — SIGNIFICANT CHANGE UP (ref 12.6–17.4)
HGB BLD CALC-MCNC: 13.7 G/DL — SIGNIFICANT CHANGE UP (ref 12.6–17.4)
HGB BLD-MCNC: 13.8 G/DL — SIGNIFICANT CHANGE UP (ref 13–17)
HGB BLD-MCNC: 13.8 G/DL — SIGNIFICANT CHANGE UP (ref 13–17)
INR BLD: 1.12 RATIO — SIGNIFICANT CHANGE UP (ref 0.85–1.18)
INR BLD: 1.12 RATIO — SIGNIFICANT CHANGE UP (ref 0.85–1.18)
LACTATE BLDV-MCNC: 1.4 MMOL/L — SIGNIFICANT CHANGE UP (ref 0.5–2)
LACTATE BLDV-MCNC: 1.4 MMOL/L — SIGNIFICANT CHANGE UP (ref 0.5–2)
LIDOCAIN IGE QN: 60 U/L — SIGNIFICANT CHANGE UP (ref 7–60)
LIDOCAIN IGE QN: 60 U/L — SIGNIFICANT CHANGE UP (ref 7–60)
MAGNESIUM SERPL-MCNC: 1.2 MG/DL — LOW (ref 1.6–2.6)
MAGNESIUM SERPL-MCNC: 1.2 MG/DL — LOW (ref 1.6–2.6)
MCHC RBC-ENTMCNC: 29.6 PG — SIGNIFICANT CHANGE UP (ref 27–34)
MCHC RBC-ENTMCNC: 29.6 PG — SIGNIFICANT CHANGE UP (ref 27–34)
MCHC RBC-ENTMCNC: 32.9 GM/DL — SIGNIFICANT CHANGE UP (ref 32–36)
MCHC RBC-ENTMCNC: 32.9 GM/DL — SIGNIFICANT CHANGE UP (ref 32–36)
MCV RBC AUTO: 89.9 FL — SIGNIFICANT CHANGE UP (ref 80–100)
MCV RBC AUTO: 89.9 FL — SIGNIFICANT CHANGE UP (ref 80–100)
PCO2 BLDV: 51 MMHG — SIGNIFICANT CHANGE UP (ref 42–55)
PCO2 BLDV: 51 MMHG — SIGNIFICANT CHANGE UP (ref 42–55)
PH BLDV: 7.36 — SIGNIFICANT CHANGE UP (ref 7.32–7.43)
PH BLDV: 7.36 — SIGNIFICANT CHANGE UP (ref 7.32–7.43)
PO2 BLDV: 25 MMHG — SIGNIFICANT CHANGE UP (ref 25–45)
PO2 BLDV: 25 MMHG — SIGNIFICANT CHANGE UP (ref 25–45)
POTASSIUM BLDV-SCNC: 3.8 MMOL/L — SIGNIFICANT CHANGE UP (ref 3.5–5.1)
POTASSIUM BLDV-SCNC: 3.8 MMOL/L — SIGNIFICANT CHANGE UP (ref 3.5–5.1)
POTASSIUM SERPL-MCNC: 3.9 MMOL/L — SIGNIFICANT CHANGE UP (ref 3.5–5.3)
POTASSIUM SERPL-MCNC: 3.9 MMOL/L — SIGNIFICANT CHANGE UP (ref 3.5–5.3)
POTASSIUM SERPL-SCNC: 3.9 MMOL/L — SIGNIFICANT CHANGE UP (ref 3.5–5.3)
POTASSIUM SERPL-SCNC: 3.9 MMOL/L — SIGNIFICANT CHANGE UP (ref 3.5–5.3)
PROT SERPL-MCNC: 6.6 G/DL — SIGNIFICANT CHANGE UP (ref 6–8.3)
PROT SERPL-MCNC: 6.6 G/DL — SIGNIFICANT CHANGE UP (ref 6–8.3)
PROTHROM AB SERPL-ACNC: 11.7 SEC — SIGNIFICANT CHANGE UP (ref 9.5–13)
PROTHROM AB SERPL-ACNC: 11.7 SEC — SIGNIFICANT CHANGE UP (ref 9.5–13)
RBC # BLD: 4.67 M/UL — SIGNIFICANT CHANGE UP (ref 4.2–5.8)
RBC # BLD: 4.67 M/UL — SIGNIFICANT CHANGE UP (ref 4.2–5.8)
RBC # FLD: 12.5 % — SIGNIFICANT CHANGE UP (ref 10.3–14.5)
RBC # FLD: 12.5 % — SIGNIFICANT CHANGE UP (ref 10.3–14.5)
RSV RNA NPH QL NAA+NON-PROBE: SIGNIFICANT CHANGE UP
RSV RNA NPH QL NAA+NON-PROBE: SIGNIFICANT CHANGE UP
SAO2 % BLDV: 25.4 % — LOW (ref 67–88)
SAO2 % BLDV: 25.4 % — LOW (ref 67–88)
SARS-COV-2 RNA SPEC QL NAA+PROBE: SIGNIFICANT CHANGE UP
SARS-COV-2 RNA SPEC QL NAA+PROBE: SIGNIFICANT CHANGE UP
SODIUM SERPL-SCNC: 140 MMOL/L — SIGNIFICANT CHANGE UP (ref 135–145)
SODIUM SERPL-SCNC: 140 MMOL/L — SIGNIFICANT CHANGE UP (ref 135–145)
TROPONIN T, HIGH SENSITIVITY RESULT: 21 NG/L — SIGNIFICANT CHANGE UP (ref 0–51)
TROPONIN T, HIGH SENSITIVITY RESULT: 21 NG/L — SIGNIFICANT CHANGE UP (ref 0–51)
WBC # BLD: 1.61 K/UL — LOW (ref 3.8–10.5)
WBC # BLD: 1.61 K/UL — LOW (ref 3.8–10.5)
WBC # FLD AUTO: 1.61 K/UL — LOW (ref 3.8–10.5)
WBC # FLD AUTO: 1.61 K/UL — LOW (ref 3.8–10.5)

## 2023-12-10 PROCEDURE — 99285 EMERGENCY DEPT VISIT HI MDM: CPT

## 2023-12-10 PROCEDURE — 71045 X-RAY EXAM CHEST 1 VIEW: CPT | Mod: 26

## 2023-12-10 NOTE — ED PROVIDER NOTE - NSICDXPASTMEDICALHX_GEN_ALL_CORE_FT
PAST MEDICAL HISTORY:  Diabetes     H/O ETOH abuse     HLD (hyperlipidemia)     HTN (hypertension)

## 2023-12-10 NOTE — ED PROVIDER NOTE - OBJECTIVE STATEMENT
73-year-old male with a history of hypertension hyperlipidemia diabetes EtOH abuse dementia presenting with generalized weakness subjective fevers and decreased p.o. intake.    Patient immigrated from the [place] in early [place]. Patient was admitted to [facility] approximately 1 week ago where he initially presented with weakness and a fall, had a negative traumatic workup.  Patient was found to have acute kidney injury, received IV fluids and his kidney function resolved.  Patient had renal ultrasounds which were unremarkable.  Patient was diagnosed with transaminitis and pancytopenia likely secondary to alcohol.  Patient accompanied by family who states that he has not had alcohol since his discharge from [facility] on December 6.   Patient denies chest pain abdominal pain shortness of breath,   But likely unreliable historian secondary to dementia patient is also not very forthcoming with answers to questions. 73-year-old male with a history of hypertension hyperlipidemia diabetes EtOH abuse dementia presenting with generalized weakness subjective fevers and decreased p.o. intake.    Patient immigrated from the [place] in early December. Patient was admitted to [facility] approximately 1 week ago where he initially presented with weakness and a fall, had a negative traumatic workup.  Patient was found to have acute kidney injury, received IV fluids and his kidney function resolved.  Patient had renal ultrasounds which were unremarkable.  Patient was diagnosed with transaminitis and pancytopenia likely secondary to alcohol.  Patient accompanied by family who states that he has not had alcohol since his discharge from [facility] on December 6.   Patient denies chest pain abdominal pain shortness of breath,   But likely unreliable historian secondary to dementia patient is also not very forthcoming with answers to questions.

## 2023-12-10 NOTE — ED PROVIDER NOTE - CLINICAL SUMMARY MEDICAL DECISION MAKING FREE TEXT BOX
73-year-old male with a history of hypertension hyperlipidemia diabetes EtOH abuse dementia presenting with generalized weakness subjective fevers and decreased p.o. intake.  Vital signs with low-grade fever, exam with normal cardiopulmonary and abdominal exam.  Will obtain CBC CMP troponin VBG flu COVID IV hydration bcx cxr and reassess 73-year-old male with a history of hypertension hyperlipidemia diabetes EtOH abuse dementia presenting with generalized weakness subjective fevers and decreased p.o. intake.  Vital signs with low-grade fever, exam with normal cardiopulmonary and abdominal exam.  Will obtain CBC CMP troponin VBG flu COVID IV hydration bcx cxr and reassess    OMAR Thrasher MD: Agree with resident/ACP MDM, assessment and plan as above.

## 2023-12-10 NOTE — ED PROVIDER NOTE - CARE PLAN
1 Principal Discharge DX:	Fever  Secondary Diagnosis:	Transaminitis  Secondary Diagnosis:	Leukopenia  Secondary Diagnosis:	Thrombocytopenia  Secondary Diagnosis:	Hypomagnesemia  Secondary Diagnosis:	MICHAELA (acute kidney injury)

## 2023-12-10 NOTE — ED ADULT NURSE NOTE - OBJECTIVE STATEMENT
72 y/o M with PMHx of HTN, HLD, DM, ETOH abuse, dementia presents to the ED with complaints of fever, generalized weakness, and decreased PO intake. As per son at bedside, patient returned from the [place] on 12/1 with increased weakness. Was seen at [facility] following a fall at which time had negative workup, however noted to have MICHAELA likely secondary to alcohol intake. Son states, patient has never withdrawn from alcohol or been admitted to ICU for alcohol withdrawal. States following discharge on 12/6 patient with fever, increased weakness, and decreased PO intake. Per family, patient has not had alcohol since 12/1, return from . Patient denies pain at this time. Notes weakness/dizziness with standing. AOx3, slow to respond, afebrile. Breathing is unlabored, spontaneous, and symmetrical. NSR on cardiac monitor. Abdomen is mildly distended. No tenderness to palpation. <2s capillary refill. No pitting edema noted, bilaterally. Family at bedside. Pt placed in position of comfort. Pt educated on call bell system and provided call bell. Bed in lowest position, wheels locked, appropriate side rails raised. Pt denies needs at this time. 72 y/o M with PMHx of HTN, HLD, DM, ETOH abuse, dementia presents to the ED with complaints of fever, generalized weakness, and decreased PO intake. As per son at bedside, patient returned from the [place] on 12/1 with increased weakness. Was seen at [facility] following a fall at which time had negative workup, however noted to have MICHAELA likely secondary to alcohol intake. Son states, patient has never withdrawn from alcohol or been admitted to ICU for alcohol withdrawal. States following discharge on 12/6 patient with fever, increased weakness, and decreased PO intake. Per family, patient has not had alcohol since 12/1, return from . Patient denies pain at this time. Notes weakness/dizziness with standing. AOx3, slow to respond, afebrile. Breathing is unlabored, spontaneous, and symmetrical. NSR on cardiac monitor. Abdomen is mildly distended. No tenderness to palpation. <2s capillary refill. No pitting edema noted, bilaterally. Family at bedside. Pt placed in position of comfort. Pt educated on call bell system and provided call bell. Bed in lowest position, wheels locked, appropriate side rails raised. Pt denies needs at this time.

## 2023-12-10 NOTE — ED ADULT NURSE NOTE - NSFALLRISKINTERV_ED_ALL_ED
Communicate fall risk and risk factors to all staff, patient, and family/Provide visual cue: yellow wristband, yellow gown, etc/Reinforce activity limits and safety measures with patient and family/Call bell, personal items and telephone in reach/Instruct patient to call for assistance before getting out of bed/chair/stretcher/Non-slip footwear applied when patient is off stretcher/[place] to call system/Physically safe environment - no spills, clutter or unnecessary equipment/Purposeful Proactive Rounding/Room/bathroom lighting operational, light cord in reach Communicate fall risk and risk factors to all staff, patient, and family/Provide visual cue: yellow wristband, yellow gown, etc/Reinforce activity limits and safety measures with patient and family/Call bell, personal items and telephone in reach/Instruct patient to call for assistance before getting out of bed/chair/stretcher/Non-slip footwear applied when patient is off stretcher/[place] to call system/Physically safe environment - no spills, clutter or unnecessary equipment/Purposeful Proactive Rounding/Room/bathroom lighting operational, light cord in reach

## 2023-12-10 NOTE — ED PROVIDER NOTE - PHYSICAL EXAMINATION
PHYSICAL EXAM:  GEN: NAD   HEAD: Atraumatic  EYES: Clear bilaterally, pupils equal, round and reactive to light.  ENMT: Airway patent, Nasal mucosa clear. Mouth with normal mucosa. Uvula is midline.   CARDIAC: Normal rate, regular rhythm. +S1/S2. No murmurs, rubs or gallops.  RESPIRATORY: Breathing unlabored. Breath sounds clear and equal bilaterally.  ABDOMEN:  Soft, nontender, nondistended. No rebound tenderness or guarding.  NEUROLOGICAL: VILLELA no fnd

## 2023-12-10 NOTE — ED PROVIDER NOTE - DIFFERENTIAL DIAGNOSIS
Ddx includes, however, is not limited to: infectious process, metabolic process, dehydration, other Differential Diagnosis

## 2023-12-10 NOTE — ED PROVIDER NOTE - PROGRESS NOTE DETAILS
Attending (Oswaldo Santiago D.O.):  Patient signed out to me, hemodynam stable, recently immigrated from [place] hx of etoh misuse. Here with weakness, subjective fevers. Seen at [facility]. Paperwork scare but reported rigo, transaminitis. given fluid, seen by nephro there, reported improvement and dc. CT reports appears nonactionable. Here, patient with bicytopenia which I find unusal for just etoh which makes more sense to be pancytopenia 2/2 myelosuppresion. Patient w/o bili elevation, doubt hemolysis. Will send ldh, haptoglobin, hep panel, rmsf, ehrlic/anaplasma, babesia, lyme (recent inc in cases of RMSF from [place] Give doxy empirically. admit. Attending (Oswaldo Santiago D.O.):  Patient signed out to me, hemodynam stable, recently immigrated from [place] hx of etoh misuse. Here with weakness, subjective fevers. Seen at [facility]. Paperwork scare but reported rigo, transaminitis. given fluid, seen by nephro there, reported improvement and dc. CT reports appears nonactionable. Here, patient with bicytopenia which I find unusal for just etoh which makes more sense to be pancytopenia 2/2 myelosuppresion. Patient w/o bili elevation, doubt hemolysis. Will send ldh, haptoglobin, hep panel, rmsf, ehrlic/anaplasma, babesia, lyme (recent inc in cases of RMSF from [place] Give doxy empirically. admit.

## 2023-12-11 DIAGNOSIS — R50.9 FEVER, UNSPECIFIED: ICD-10-CM

## 2023-12-11 DIAGNOSIS — Z29.9 ENCOUNTER FOR PROPHYLACTIC MEASURES, UNSPECIFIED: ICD-10-CM

## 2023-12-11 DIAGNOSIS — E78.5 HYPERLIPIDEMIA, UNSPECIFIED: ICD-10-CM

## 2023-12-11 DIAGNOSIS — R53.1 WEAKNESS: ICD-10-CM

## 2023-12-11 DIAGNOSIS — N40.0 BENIGN PROSTATIC HYPERPLASIA WITHOUT LOWER URINARY TRACT SYMPTOMS: ICD-10-CM

## 2023-12-11 DIAGNOSIS — E11.9 TYPE 2 DIABETES MELLITUS WITHOUT COMPLICATIONS: ICD-10-CM

## 2023-12-11 DIAGNOSIS — R65.10 SYSTEMIC INFLAMMATORY RESPONSE SYNDROME (SIRS) OF NON-INFECTIOUS ORIGIN WITHOUT ACUTE ORGAN DYSFUNCTION: ICD-10-CM

## 2023-12-11 DIAGNOSIS — N17.9 ACUTE KIDNEY FAILURE, UNSPECIFIED: ICD-10-CM

## 2023-12-11 DIAGNOSIS — F10.10 ALCOHOL ABUSE, UNCOMPLICATED: ICD-10-CM

## 2023-12-11 DIAGNOSIS — I10 ESSENTIAL (PRIMARY) HYPERTENSION: ICD-10-CM

## 2023-12-11 LAB
ALBUMIN SERPL ELPH-MCNC: 3.3 G/DL — SIGNIFICANT CHANGE UP (ref 3.3–5)
ALBUMIN SERPL ELPH-MCNC: 3.3 G/DL — SIGNIFICANT CHANGE UP (ref 3.3–5)
ALP SERPL-CCNC: 37 U/L — LOW (ref 40–120)
ALP SERPL-CCNC: 37 U/L — LOW (ref 40–120)
ALT FLD-CCNC: 198 U/L — HIGH (ref 10–45)
ALT FLD-CCNC: 198 U/L — HIGH (ref 10–45)
ANION GAP SERPL CALC-SCNC: 10 MMOL/L — SIGNIFICANT CHANGE UP (ref 5–17)
ANION GAP SERPL CALC-SCNC: 10 MMOL/L — SIGNIFICANT CHANGE UP (ref 5–17)
APAP SERPL-MCNC: <15 UG/ML — SIGNIFICANT CHANGE UP (ref 10–30)
APAP SERPL-MCNC: <15 UG/ML — SIGNIFICANT CHANGE UP (ref 10–30)
APPEARANCE UR: CLEAR — SIGNIFICANT CHANGE UP
APPEARANCE UR: CLEAR — SIGNIFICANT CHANGE UP
AST SERPL-CCNC: 271 U/L — HIGH (ref 10–40)
AST SERPL-CCNC: 271 U/L — HIGH (ref 10–40)
BABESIA MICROTI PCR, BLD RESULT: SIGNIFICANT CHANGE UP
BABESIA MICROTI PCR, BLD RESULT: SIGNIFICANT CHANGE UP
BACTERIA # UR AUTO: NEGATIVE /HPF — SIGNIFICANT CHANGE UP
BACTERIA # UR AUTO: NEGATIVE /HPF — SIGNIFICANT CHANGE UP
BASOPHILS # BLD AUTO: 0.01 K/UL — SIGNIFICANT CHANGE UP (ref 0–0.2)
BASOPHILS # BLD AUTO: 0.01 K/UL — SIGNIFICANT CHANGE UP (ref 0–0.2)
BASOPHILS # BLD AUTO: 0.04 K/UL — SIGNIFICANT CHANGE UP (ref 0–0.2)
BASOPHILS # BLD AUTO: 0.04 K/UL — SIGNIFICANT CHANGE UP (ref 0–0.2)
BASOPHILS NFR BLD AUTO: 0.6 % — SIGNIFICANT CHANGE UP (ref 0–2)
BASOPHILS NFR BLD AUTO: 0.6 % — SIGNIFICANT CHANGE UP (ref 0–2)
BASOPHILS NFR BLD AUTO: 2.7 % — HIGH (ref 0–2)
BASOPHILS NFR BLD AUTO: 2.7 % — HIGH (ref 0–2)
BILIRUB SERPL-MCNC: 0.4 MG/DL — SIGNIFICANT CHANGE UP (ref 0.2–1.2)
BILIRUB SERPL-MCNC: 0.4 MG/DL — SIGNIFICANT CHANGE UP (ref 0.2–1.2)
BILIRUB UR-MCNC: NEGATIVE — SIGNIFICANT CHANGE UP
BILIRUB UR-MCNC: NEGATIVE — SIGNIFICANT CHANGE UP
BUN SERPL-MCNC: 38 MG/DL — HIGH (ref 7–23)
BUN SERPL-MCNC: 38 MG/DL — HIGH (ref 7–23)
CALCIUM SERPL-MCNC: 7.9 MG/DL — LOW (ref 8.4–10.5)
CALCIUM SERPL-MCNC: 7.9 MG/DL — LOW (ref 8.4–10.5)
CAST: 2 /LPF — SIGNIFICANT CHANGE UP (ref 0–4)
CAST: 2 /LPF — SIGNIFICANT CHANGE UP (ref 0–4)
CHLORIDE SERPL-SCNC: 103 MMOL/L — SIGNIFICANT CHANGE UP (ref 96–108)
CHLORIDE SERPL-SCNC: 103 MMOL/L — SIGNIFICANT CHANGE UP (ref 96–108)
CHOLEST SERPL-MCNC: 103 MG/DL — SIGNIFICANT CHANGE UP
CHOLEST SERPL-MCNC: 103 MG/DL — SIGNIFICANT CHANGE UP
CMV DNA CSF QL NAA+PROBE: SIGNIFICANT CHANGE UP IU/ML
CMV DNA CSF QL NAA+PROBE: SIGNIFICANT CHANGE UP IU/ML
CMV DNA SPEC NAA+PROBE-LOG#: SIGNIFICANT CHANGE UP LOG10IU/ML
CMV DNA SPEC NAA+PROBE-LOG#: SIGNIFICANT CHANGE UP LOG10IU/ML
CO2 SERPL-SCNC: 27 MMOL/L — SIGNIFICANT CHANGE UP (ref 22–31)
CO2 SERPL-SCNC: 27 MMOL/L — SIGNIFICANT CHANGE UP (ref 22–31)
COLOR SPEC: YELLOW — SIGNIFICANT CHANGE UP
COLOR SPEC: YELLOW — SIGNIFICANT CHANGE UP
CREAT ?TM UR-MCNC: 120 MG/DL — SIGNIFICANT CHANGE UP
CREAT ?TM UR-MCNC: 120 MG/DL — SIGNIFICANT CHANGE UP
CREAT ?TM UR-MCNC: 170 MG/DL — SIGNIFICANT CHANGE UP
CREAT ?TM UR-MCNC: 170 MG/DL — SIGNIFICANT CHANGE UP
CREAT SERPL-MCNC: 2.79 MG/DL — HIGH (ref 0.5–1.3)
CREAT SERPL-MCNC: 2.79 MG/DL — HIGH (ref 0.5–1.3)
CRP SERPL-MCNC: <3 MG/L — SIGNIFICANT CHANGE UP (ref 0–4)
CRP SERPL-MCNC: <3 MG/L — SIGNIFICANT CHANGE UP (ref 0–4)
DIFF PNL FLD: NEGATIVE — SIGNIFICANT CHANGE UP
DIFF PNL FLD: NEGATIVE — SIGNIFICANT CHANGE UP
EBV DNA SERPL NAA+PROBE-ACNC: ABNORMAL IU/ML
EBV DNA SERPL NAA+PROBE-ACNC: ABNORMAL IU/ML
EBVPCR LOG: ABNORMAL LOG10IU/ML
EBVPCR LOG: ABNORMAL LOG10IU/ML
EGFR: 23 ML/MIN/1.73M2 — LOW
EGFR: 23 ML/MIN/1.73M2 — LOW
EOSINOPHIL # BLD AUTO: 0 K/UL — SIGNIFICANT CHANGE UP (ref 0–0.5)
EOSINOPHIL # BLD AUTO: 0 K/UL — SIGNIFICANT CHANGE UP (ref 0–0.5)
EOSINOPHIL # BLD AUTO: 0.01 K/UL — SIGNIFICANT CHANGE UP (ref 0–0.5)
EOSINOPHIL # BLD AUTO: 0.01 K/UL — SIGNIFICANT CHANGE UP (ref 0–0.5)
EOSINOPHIL NFR BLD AUTO: 0 % — SIGNIFICANT CHANGE UP (ref 0–6)
EOSINOPHIL NFR BLD AUTO: 0 % — SIGNIFICANT CHANGE UP (ref 0–6)
EOSINOPHIL NFR BLD AUTO: 0.6 % — SIGNIFICANT CHANGE UP (ref 0–6)
EOSINOPHIL NFR BLD AUTO: 0.6 % — SIGNIFICANT CHANGE UP (ref 0–6)
ETHANOL SERPL-MCNC: <10 MG/DL — SIGNIFICANT CHANGE UP (ref 0–10)
ETHANOL SERPL-MCNC: <10 MG/DL — SIGNIFICANT CHANGE UP (ref 0–10)
GIANT PLATELETS BLD QL SMEAR: PRESENT — SIGNIFICANT CHANGE UP
GIANT PLATELETS BLD QL SMEAR: PRESENT — SIGNIFICANT CHANGE UP
GLUCOSE BLDC GLUCOMTR-MCNC: 110 MG/DL — HIGH (ref 70–99)
GLUCOSE BLDC GLUCOMTR-MCNC: 110 MG/DL — HIGH (ref 70–99)
GLUCOSE BLDC GLUCOMTR-MCNC: 123 MG/DL — HIGH (ref 70–99)
GLUCOSE BLDC GLUCOMTR-MCNC: 123 MG/DL — HIGH (ref 70–99)
GLUCOSE BLDC GLUCOMTR-MCNC: 97 MG/DL — SIGNIFICANT CHANGE UP (ref 70–99)
GLUCOSE BLDC GLUCOMTR-MCNC: 97 MG/DL — SIGNIFICANT CHANGE UP (ref 70–99)
GLUCOSE SERPL-MCNC: 142 MG/DL — HIGH (ref 70–99)
GLUCOSE SERPL-MCNC: 142 MG/DL — HIGH (ref 70–99)
GLUCOSE UR QL: NEGATIVE MG/DL — SIGNIFICANT CHANGE UP
GLUCOSE UR QL: NEGATIVE MG/DL — SIGNIFICANT CHANGE UP
HAPTOGLOB SERPL-MCNC: 165 MG/DL — SIGNIFICANT CHANGE UP (ref 34–200)
HAPTOGLOB SERPL-MCNC: 165 MG/DL — SIGNIFICANT CHANGE UP (ref 34–200)
HAV IGM SER-ACNC: SIGNIFICANT CHANGE UP
HAV IGM SER-ACNC: SIGNIFICANT CHANGE UP
HBV CORE IGM SER-ACNC: SIGNIFICANT CHANGE UP
HBV CORE IGM SER-ACNC: SIGNIFICANT CHANGE UP
HBV SURFACE AG SER-ACNC: SIGNIFICANT CHANGE UP
HBV SURFACE AG SER-ACNC: SIGNIFICANT CHANGE UP
HCT VFR BLD CALC: 39.1 % — SIGNIFICANT CHANGE UP (ref 39–50)
HCT VFR BLD CALC: 39.1 % — SIGNIFICANT CHANGE UP (ref 39–50)
HCV AB S/CO SERPL IA: 0.11 S/CO — SIGNIFICANT CHANGE UP (ref 0–0.99)
HCV AB S/CO SERPL IA: 0.11 S/CO — SIGNIFICANT CHANGE UP (ref 0–0.99)
HCV AB SERPL-IMP: SIGNIFICANT CHANGE UP
HCV AB SERPL-IMP: SIGNIFICANT CHANGE UP
HDLC SERPL-MCNC: 21 MG/DL — LOW
HDLC SERPL-MCNC: 21 MG/DL — LOW
HGB BLD-MCNC: 13.1 G/DL — SIGNIFICANT CHANGE UP (ref 13–17)
HGB BLD-MCNC: 13.1 G/DL — SIGNIFICANT CHANGE UP (ref 13–17)
IMM GRANULOCYTES NFR BLD AUTO: 1.3 % — HIGH (ref 0–0.9)
IMM GRANULOCYTES NFR BLD AUTO: 1.3 % — HIGH (ref 0–0.9)
KETONES UR-MCNC: NEGATIVE MG/DL — SIGNIFICANT CHANGE UP
KETONES UR-MCNC: NEGATIVE MG/DL — SIGNIFICANT CHANGE UP
LDH SERPL L TO P-CCNC: 455 U/L — HIGH (ref 50–242)
LDH SERPL L TO P-CCNC: 455 U/L — HIGH (ref 50–242)
LEUKOCYTE ESTERASE UR-ACNC: NEGATIVE — SIGNIFICANT CHANGE UP
LEUKOCYTE ESTERASE UR-ACNC: NEGATIVE — SIGNIFICANT CHANGE UP
LIPID PNL WITH DIRECT LDL SERPL: 56 MG/DL — SIGNIFICANT CHANGE UP
LIPID PNL WITH DIRECT LDL SERPL: 56 MG/DL — SIGNIFICANT CHANGE UP
LYMPHOCYTES # BLD AUTO: 0.29 K/UL — LOW (ref 1–3.3)
LYMPHOCYTES # BLD AUTO: 0.29 K/UL — LOW (ref 1–3.3)
LYMPHOCYTES # BLD AUTO: 0.52 K/UL — LOW (ref 1–3.3)
LYMPHOCYTES # BLD AUTO: 0.52 K/UL — LOW (ref 1–3.3)
LYMPHOCYTES # BLD AUTO: 17.8 % — SIGNIFICANT CHANGE UP (ref 13–44)
LYMPHOCYTES # BLD AUTO: 17.8 % — SIGNIFICANT CHANGE UP (ref 13–44)
LYMPHOCYTES # BLD AUTO: 32.7 % — SIGNIFICANT CHANGE UP (ref 13–44)
LYMPHOCYTES # BLD AUTO: 32.7 % — SIGNIFICANT CHANGE UP (ref 13–44)
MAGNESIUM SERPL-MCNC: 1.3 MG/DL — LOW (ref 1.6–2.6)
MAGNESIUM SERPL-MCNC: 1.3 MG/DL — LOW (ref 1.6–2.6)
MAGNESIUM SERPL-MCNC: 2.1 MG/DL — SIGNIFICANT CHANGE UP (ref 1.6–2.6)
MAGNESIUM SERPL-MCNC: 2.1 MG/DL — SIGNIFICANT CHANGE UP (ref 1.6–2.6)
MANUAL SMEAR VERIFICATION: SIGNIFICANT CHANGE UP
MANUAL SMEAR VERIFICATION: SIGNIFICANT CHANGE UP
MCHC RBC-ENTMCNC: 29.9 PG — SIGNIFICANT CHANGE UP (ref 27–34)
MCHC RBC-ENTMCNC: 29.9 PG — SIGNIFICANT CHANGE UP (ref 27–34)
MCHC RBC-ENTMCNC: 33.5 GM/DL — SIGNIFICANT CHANGE UP (ref 32–36)
MCHC RBC-ENTMCNC: 33.5 GM/DL — SIGNIFICANT CHANGE UP (ref 32–36)
MCV RBC AUTO: 89.3 FL — SIGNIFICANT CHANGE UP (ref 80–100)
MCV RBC AUTO: 89.3 FL — SIGNIFICANT CHANGE UP (ref 80–100)
MONOCYTES # BLD AUTO: 0.15 K/UL — SIGNIFICANT CHANGE UP (ref 0–0.9)
MONOCYTES # BLD AUTO: 0.15 K/UL — SIGNIFICANT CHANGE UP (ref 0–0.9)
MONOCYTES # BLD AUTO: 0.2 K/UL — SIGNIFICANT CHANGE UP (ref 0–0.9)
MONOCYTES # BLD AUTO: 0.2 K/UL — SIGNIFICANT CHANGE UP (ref 0–0.9)
MONOCYTES NFR BLD AUTO: 12.5 % — SIGNIFICANT CHANGE UP (ref 2–14)
MONOCYTES NFR BLD AUTO: 12.5 % — SIGNIFICANT CHANGE UP (ref 2–14)
MONOCYTES NFR BLD AUTO: 9.4 % — SIGNIFICANT CHANGE UP (ref 2–14)
MONOCYTES NFR BLD AUTO: 9.4 % — SIGNIFICANT CHANGE UP (ref 2–14)
MYELOCYTES NFR BLD: 1.8 % — HIGH (ref 0–0)
MYELOCYTES NFR BLD: 1.8 % — HIGH (ref 0–0)
NEUTROPHILS # BLD AUTO: 0.88 K/UL — LOW (ref 1.8–7.4)
NEUTROPHILS # BLD AUTO: 0.88 K/UL — LOW (ref 1.8–7.4)
NEUTROPHILS # BLD AUTO: 1.04 K/UL — LOW (ref 1.8–7.4)
NEUTROPHILS # BLD AUTO: 1.04 K/UL — LOW (ref 1.8–7.4)
NEUTROPHILS NFR BLD AUTO: 55.4 % — SIGNIFICANT CHANGE UP (ref 43–77)
NEUTROPHILS NFR BLD AUTO: 55.4 % — SIGNIFICANT CHANGE UP (ref 43–77)
NEUTROPHILS NFR BLD AUTO: 64.3 % — SIGNIFICANT CHANGE UP (ref 43–77)
NEUTROPHILS NFR BLD AUTO: 64.3 % — SIGNIFICANT CHANGE UP (ref 43–77)
NITRITE UR-MCNC: NEGATIVE — SIGNIFICANT CHANGE UP
NITRITE UR-MCNC: NEGATIVE — SIGNIFICANT CHANGE UP
NON HDL CHOLESTEROL: 82 MG/DL — SIGNIFICANT CHANGE UP
NON HDL CHOLESTEROL: 82 MG/DL — SIGNIFICANT CHANGE UP
NRBC # BLD: 0 /100 WBCS — SIGNIFICANT CHANGE UP (ref 0–0)
NRBC # BLD: 0 /100 WBCS — SIGNIFICANT CHANGE UP (ref 0–0)
OSMOLALITY UR: 413 MOS/KG — SIGNIFICANT CHANGE UP (ref 300–900)
OSMOLALITY UR: 413 MOS/KG — SIGNIFICANT CHANGE UP (ref 300–900)
OSMOLALITY UR: 441 MOS/KG — SIGNIFICANT CHANGE UP (ref 300–900)
OSMOLALITY UR: 441 MOS/KG — SIGNIFICANT CHANGE UP (ref 300–900)
PH UR: 5.5 — SIGNIFICANT CHANGE UP (ref 5–8)
PH UR: 5.5 — SIGNIFICANT CHANGE UP (ref 5–8)
PHOSPHATE SERPL-MCNC: 2.8 MG/DL — SIGNIFICANT CHANGE UP (ref 2.5–4.5)
PHOSPHATE SERPL-MCNC: 2.8 MG/DL — SIGNIFICANT CHANGE UP (ref 2.5–4.5)
PLAT MORPH BLD: NORMAL — SIGNIFICANT CHANGE UP
PLAT MORPH BLD: NORMAL — SIGNIFICANT CHANGE UP
PLATELET # BLD AUTO: 37 K/UL — LOW (ref 150–400)
PLATELET # BLD AUTO: 37 K/UL — LOW (ref 150–400)
PLATELET # BLD AUTO: 49 K/UL — LOW (ref 150–400)
PLATELET # BLD AUTO: 49 K/UL — LOW (ref 150–400)
PLATELET COUNT - ESTIMATE: ABNORMAL
PLATELET COUNT - ESTIMATE: ABNORMAL
POTASSIUM SERPL-MCNC: 3.7 MMOL/L — SIGNIFICANT CHANGE UP (ref 3.5–5.3)
POTASSIUM SERPL-MCNC: 3.7 MMOL/L — SIGNIFICANT CHANGE UP (ref 3.5–5.3)
POTASSIUM SERPL-SCNC: 3.7 MMOL/L — SIGNIFICANT CHANGE UP (ref 3.5–5.3)
POTASSIUM SERPL-SCNC: 3.7 MMOL/L — SIGNIFICANT CHANGE UP (ref 3.5–5.3)
POTASSIUM UR-SCNC: 37 MMOL/L — SIGNIFICANT CHANGE UP
POTASSIUM UR-SCNC: 37 MMOL/L — SIGNIFICANT CHANGE UP
PROCALCITONIN SERPL-MCNC: 0.3 NG/ML — HIGH (ref 0.02–0.1)
PROCALCITONIN SERPL-MCNC: 0.3 NG/ML — HIGH (ref 0.02–0.1)
PROT ?TM UR-MCNC: 17 MG/DL — HIGH (ref 0–12)
PROT ?TM UR-MCNC: 17 MG/DL — HIGH (ref 0–12)
PROT SERPL-MCNC: 6.2 G/DL — SIGNIFICANT CHANGE UP (ref 6–8.3)
PROT SERPL-MCNC: 6.2 G/DL — SIGNIFICANT CHANGE UP (ref 6–8.3)
PROT UR-MCNC: SIGNIFICANT CHANGE UP MG/DL
PROT UR-MCNC: SIGNIFICANT CHANGE UP MG/DL
PROT/CREAT UR-RTO: 0.1 RATIO — SIGNIFICANT CHANGE UP (ref 0–0.2)
PROT/CREAT UR-RTO: 0.1 RATIO — SIGNIFICANT CHANGE UP (ref 0–0.2)
RBC # BLD: 4.38 M/UL — SIGNIFICANT CHANGE UP (ref 4.2–5.8)
RBC # BLD: 4.38 M/UL — SIGNIFICANT CHANGE UP (ref 4.2–5.8)
RBC # FLD: 12.5 % — SIGNIFICANT CHANGE UP (ref 10.3–14.5)
RBC # FLD: 12.5 % — SIGNIFICANT CHANGE UP (ref 10.3–14.5)
RBC BLD AUTO: NORMAL — SIGNIFICANT CHANGE UP
RBC BLD AUTO: NORMAL — SIGNIFICANT CHANGE UP
RBC CASTS # UR COMP ASSIST: 0 /HPF — SIGNIFICANT CHANGE UP (ref 0–4)
RBC CASTS # UR COMP ASSIST: 0 /HPF — SIGNIFICANT CHANGE UP (ref 0–4)
RETICS #: 17.1 K/UL — LOW (ref 25–125)
RETICS #: 17.1 K/UL — LOW (ref 25–125)
RETICS/RBC NFR: 0.4 % — LOW (ref 0.5–2.5)
RETICS/RBC NFR: 0.4 % — LOW (ref 0.5–2.5)
SMUDGE CELLS # BLD: PRESENT — SIGNIFICANT CHANGE UP
SMUDGE CELLS # BLD: PRESENT — SIGNIFICANT CHANGE UP
SODIUM SERPL-SCNC: 140 MMOL/L — SIGNIFICANT CHANGE UP (ref 135–145)
SODIUM SERPL-SCNC: 140 MMOL/L — SIGNIFICANT CHANGE UP (ref 135–145)
SODIUM UR-SCNC: 39 MMOL/L — SIGNIFICANT CHANGE UP
SODIUM UR-SCNC: 39 MMOL/L — SIGNIFICANT CHANGE UP
SODIUM UR-SCNC: 58 MMOL/L — SIGNIFICANT CHANGE UP
SODIUM UR-SCNC: 58 MMOL/L — SIGNIFICANT CHANGE UP
SP GR SPEC: 1.02 — SIGNIFICANT CHANGE UP (ref 1–1.03)
SP GR SPEC: 1.02 — SIGNIFICANT CHANGE UP (ref 1–1.03)
SQUAMOUS # UR AUTO: 8 /HPF — HIGH (ref 0–5)
SQUAMOUS # UR AUTO: 8 /HPF — HIGH (ref 0–5)
TRIGL SERPL-MCNC: 148 MG/DL — SIGNIFICANT CHANGE UP
TRIGL SERPL-MCNC: 148 MG/DL — SIGNIFICANT CHANGE UP
UROBILINOGEN FLD QL: 0.2 MG/DL — SIGNIFICANT CHANGE UP (ref 0.2–1)
UROBILINOGEN FLD QL: 0.2 MG/DL — SIGNIFICANT CHANGE UP (ref 0.2–1)
UUN UR-MCNC: 546 MG/DL — SIGNIFICANT CHANGE UP
UUN UR-MCNC: 546 MG/DL — SIGNIFICANT CHANGE UP
VARIANT LYMPHS # BLD: 0.9 % — SIGNIFICANT CHANGE UP (ref 0–6)
VARIANT LYMPHS # BLD: 0.9 % — SIGNIFICANT CHANGE UP (ref 0–6)
WBC # BLD: 1.59 K/UL — LOW (ref 3.8–10.5)
WBC # BLD: 1.59 K/UL — LOW (ref 3.8–10.5)
WBC # FLD AUTO: 1.59 K/UL — LOW (ref 3.8–10.5)
WBC # FLD AUTO: 1.59 K/UL — LOW (ref 3.8–10.5)
WBC UR QL: 2 /HPF — SIGNIFICANT CHANGE UP (ref 0–5)
WBC UR QL: 2 /HPF — SIGNIFICANT CHANGE UP (ref 0–5)

## 2023-12-11 PROCEDURE — 99223 1ST HOSP IP/OBS HIGH 75: CPT

## 2023-12-11 PROCEDURE — 76700 US EXAM ABDOM COMPLETE: CPT | Mod: 26

## 2023-12-11 PROCEDURE — 70450 CT HEAD/BRAIN W/O DYE: CPT | Mod: 26

## 2023-12-11 PROCEDURE — 99223 1ST HOSP IP/OBS HIGH 75: CPT | Mod: GC

## 2023-12-11 RX ORDER — MAGNESIUM SULFATE 500 MG/ML
2 VIAL (ML) INJECTION ONCE
Refills: 0 | Status: COMPLETED | OUTPATIENT
Start: 2023-12-11 | End: 2023-12-11

## 2023-12-11 RX ORDER — MECLIZINE HCL 12.5 MG
1 TABLET ORAL
Refills: 0 | DISCHARGE

## 2023-12-11 RX ORDER — AMLODIPINE BESYLATE 2.5 MG/1
5 TABLET ORAL DAILY
Refills: 0 | Status: DISCONTINUED | OUTPATIENT
Start: 2023-12-11 | End: 2023-12-17

## 2023-12-11 RX ORDER — SODIUM CHLORIDE 9 MG/ML
1000 INJECTION, SOLUTION INTRAVENOUS
Refills: 0 | Status: DISCONTINUED | OUTPATIENT
Start: 2023-12-11 | End: 2023-12-14

## 2023-12-11 RX ORDER — ATORVASTATIN CALCIUM 80 MG/1
10 TABLET, FILM COATED ORAL AT BEDTIME
Refills: 0 | Status: DISCONTINUED | OUTPATIENT
Start: 2023-12-11 | End: 2023-12-17

## 2023-12-11 RX ORDER — FOLIC ACID 0.8 MG
1 TABLET ORAL DAILY
Refills: 0 | Status: DISCONTINUED | OUTPATIENT
Start: 2023-12-11 | End: 2023-12-11

## 2023-12-11 RX ORDER — FOLIC ACID 0.8 MG
1 TABLET ORAL
Refills: 0 | DISCHARGE

## 2023-12-11 RX ORDER — SODIUM CHLORIDE 9 MG/ML
1000 INJECTION, SOLUTION INTRAVENOUS
Refills: 0 | Status: DISCONTINUED | OUTPATIENT
Start: 2023-12-11 | End: 2023-12-17

## 2023-12-11 RX ORDER — DEXTROSE 50 % IN WATER 50 %
25 SYRINGE (ML) INTRAVENOUS ONCE
Refills: 0 | Status: DISCONTINUED | OUTPATIENT
Start: 2023-12-11 | End: 2023-12-17

## 2023-12-11 RX ORDER — PANTOPRAZOLE SODIUM 20 MG/1
40 TABLET, DELAYED RELEASE ORAL
Refills: 0 | Status: DISCONTINUED | OUTPATIENT
Start: 2023-12-11 | End: 2023-12-17

## 2023-12-11 RX ORDER — THIAMINE MONONITRATE (VIT B1) 100 MG
100 TABLET ORAL DAILY
Refills: 0 | Status: DISCONTINUED | OUTPATIENT
Start: 2023-12-11 | End: 2023-12-11

## 2023-12-11 RX ORDER — INSULIN LISPRO 100/ML
VIAL (ML) SUBCUTANEOUS
Refills: 0 | Status: DISCONTINUED | OUTPATIENT
Start: 2023-12-11 | End: 2023-12-17

## 2023-12-11 RX ORDER — THIAMINE MONONITRATE (VIT B1) 100 MG
1 TABLET ORAL
Refills: 0 | DISCHARGE

## 2023-12-11 RX ORDER — FINASTERIDE 5 MG/1
5 TABLET, FILM COATED ORAL DAILY
Refills: 0 | Status: DISCONTINUED | OUTPATIENT
Start: 2023-12-11 | End: 2023-12-17

## 2023-12-11 RX ORDER — DEXTROSE 50 % IN WATER 50 %
15 SYRINGE (ML) INTRAVENOUS ONCE
Refills: 0 | Status: DISCONTINUED | OUTPATIENT
Start: 2023-12-11 | End: 2023-12-17

## 2023-12-11 RX ORDER — GLUCAGON INJECTION, SOLUTION 0.5 MG/.1ML
1 INJECTION, SOLUTION SUBCUTANEOUS ONCE
Refills: 0 | Status: DISCONTINUED | OUTPATIENT
Start: 2023-12-11 | End: 2023-12-17

## 2023-12-11 RX ORDER — DONEPEZIL HYDROCHLORIDE 10 MG/1
5 TABLET, FILM COATED ORAL AT BEDTIME
Refills: 0 | Status: DISCONTINUED | OUTPATIENT
Start: 2023-12-11 | End: 2023-12-17

## 2023-12-11 RX ORDER — DEXTROSE 50 % IN WATER 50 %
12.5 SYRINGE (ML) INTRAVENOUS ONCE
Refills: 0 | Status: DISCONTINUED | OUTPATIENT
Start: 2023-12-11 | End: 2023-12-17

## 2023-12-11 RX ORDER — METFORMIN HYDROCHLORIDE 850 MG/1
0 TABLET ORAL
Qty: 0 | Refills: 0 | DISCHARGE

## 2023-12-11 RX ORDER — SODIUM CHLORIDE 9 MG/ML
1000 INJECTION INTRAMUSCULAR; INTRAVENOUS; SUBCUTANEOUS ONCE
Refills: 0 | Status: COMPLETED | OUTPATIENT
Start: 2023-12-11 | End: 2023-12-11

## 2023-12-11 RX ORDER — TAMSULOSIN HYDROCHLORIDE 0.4 MG/1
0.4 CAPSULE ORAL AT BEDTIME
Refills: 0 | Status: DISCONTINUED | OUTPATIENT
Start: 2023-12-11 | End: 2023-12-17

## 2023-12-11 RX ORDER — FOLIC ACID 0.8 MG
1 TABLET ORAL DAILY
Refills: 0 | Status: DISCONTINUED | OUTPATIENT
Start: 2023-12-11 | End: 2023-12-17

## 2023-12-11 RX ORDER — ATORVASTATIN CALCIUM 80 MG/1
1 TABLET, FILM COATED ORAL
Refills: 0 | DISCHARGE

## 2023-12-11 RX ORDER — ESOMEPRAZOLE MAGNESIUM 40 MG/1
1 CAPSULE, DELAYED RELEASE ORAL
Refills: 0 | DISCHARGE

## 2023-12-11 RX ORDER — ACETAMINOPHEN 500 MG
975 TABLET ORAL ONCE
Refills: 0 | Status: COMPLETED | OUTPATIENT
Start: 2023-12-11 | End: 2023-12-11

## 2023-12-11 RX ORDER — METFORMIN HYDROCHLORIDE 850 MG/1
1 TABLET ORAL
Refills: 0 | DISCHARGE

## 2023-12-11 RX ORDER — ACETAMINOPHEN 500 MG
650 TABLET ORAL EVERY 6 HOURS
Refills: 0 | Status: DISCONTINUED | OUTPATIENT
Start: 2023-12-11 | End: 2023-12-17

## 2023-12-11 RX ORDER — THIAMINE MONONITRATE (VIT B1) 100 MG
100 TABLET ORAL DAILY
Refills: 0 | Status: DISCONTINUED | OUTPATIENT
Start: 2023-12-11 | End: 2023-12-17

## 2023-12-11 RX ORDER — MAGNESIUM OXIDE 400 MG ORAL TABLET 241.3 MG
800 TABLET ORAL ONCE
Refills: 0 | Status: COMPLETED | OUTPATIENT
Start: 2023-12-11 | End: 2023-12-11

## 2023-12-11 RX ADMIN — Medication 975 MILLIGRAM(S): at 02:53

## 2023-12-11 RX ADMIN — Medication 100 MILLIGRAM(S): at 07:39

## 2023-12-11 RX ADMIN — FINASTERIDE 5 MILLIGRAM(S): 5 TABLET, FILM COATED ORAL at 13:03

## 2023-12-11 RX ADMIN — SODIUM CHLORIDE 1000 MILLILITER(S): 9 INJECTION INTRAMUSCULAR; INTRAVENOUS; SUBCUTANEOUS at 02:53

## 2023-12-11 RX ADMIN — Medication 1 MILLIGRAM(S): at 13:03

## 2023-12-11 RX ADMIN — Medication 25 GRAM(S): at 02:53

## 2023-12-11 RX ADMIN — Medication 100 MILLIGRAM(S): at 13:03

## 2023-12-11 RX ADMIN — TAMSULOSIN HYDROCHLORIDE 0.4 MILLIGRAM(S): 0.4 CAPSULE ORAL at 21:56

## 2023-12-11 RX ADMIN — Medication 650 MILLIGRAM(S): at 17:47

## 2023-12-11 RX ADMIN — Medication 25 GRAM(S): at 00:40

## 2023-12-11 RX ADMIN — Medication 975 MILLIGRAM(S): at 04:37

## 2023-12-11 RX ADMIN — SODIUM CHLORIDE 1000 MILLILITER(S): 9 INJECTION INTRAMUSCULAR; INTRAVENOUS; SUBCUTANEOUS at 00:40

## 2023-12-11 RX ADMIN — Medication 100 MILLIGRAM(S): at 17:48

## 2023-12-11 RX ADMIN — DONEPEZIL HYDROCHLORIDE 5 MILLIGRAM(S): 10 TABLET, FILM COATED ORAL at 21:56

## 2023-12-11 RX ADMIN — ATORVASTATIN CALCIUM 10 MILLIGRAM(S): 80 TABLET, FILM COATED ORAL at 21:56

## 2023-12-11 RX ADMIN — MAGNESIUM OXIDE 400 MG ORAL TABLET 800 MILLIGRAM(S): 241.3 TABLET ORAL at 02:53

## 2023-12-11 RX ADMIN — Medication 100 MILLIGRAM(S): at 02:53

## 2023-12-11 RX ADMIN — Medication 2 GRAM(S): at 04:37

## 2023-12-11 NOTE — H&P ADULT - NSHPPHYSICALEXAM_GEN_ALL_CORE
General: NAD, well groomed, well developed   Head: atraumatic, normocephalic   Eyes: sclera and conjunctiva clear   ENMT; slightly dry mucous membranes  Neck: no JVD, no tender lymphadenopathy   Lungs: clear lung fields bilaterally, no wheezes, rales, or rhonchi  Heart: regular rate and rhythm, normal s1,s2, no murmurs, rubs, or gallop   Extremities: warm, well perfused, no lower extremity edema  Skin: right calf with raised papule with central clearing, non-erythematous   Neuro: AOx2, no focal neurological deficits

## 2023-12-11 NOTE — H&P ADULT - PROBLEM SELECTOR PLAN 2
son reports patient drink 1/2 bottle or more of hard liquor daily, last drink November 28  - has previously tried to quit  - never had withdrawals, outside window for CIWA   - SBIRT consult   - daily folic acid, thiamine   - f/u RUQ to r/o alcoholic hepatitis   - would send acute hepatitis panel i/s/o transaminitis creatinine on admission was 3.5  - f/u abdominal u/s   - f/u urine studies   - nephrology consult

## 2023-12-11 NOTE — H&P ADULT - PROBLEM SELECTOR PLAN 3
- f/u lipid profile   - not currently on any statins son reports patient drink 1/2 bottle or more of hard liquor daily, last drink November 28  - has previously tried to quit  - never had withdrawals, outside window for CIWA   - SBIRT consult   - daily folic acid, thiamine   - abdominal ultrasound to r/o alcoholic hepatitis   - f/u hepatitis panel i/s/o transaminitis

## 2023-12-11 NOTE — H&P ADULT - NSHPREVIEWOFSYSTEMS_GEN_ALL_CORE
REVIEW OF SYSTEMS:    CONSTITUTIONAL: + weakness, no fevers, no chills  EYES/ENT: No visual changes;  No vertigo or throat pain   NECK: No pain or stiffness  RESPIRATORY: No cough, wheezing, hemoptysis; No shortness of breath  CARDIOVASCULAR: No chest pain or palpitations  GASTROINTESTINAL: No abdominal or epigastric pain. No nausea, vomiting, or hematemesis; No diarrhea or constipation. No melena or hematochezia.  GENITOURINARY: No dysuria, frequency or hematuria  NEUROLOGICAL: No numbness or weakness  All other review of systems is negative unless indicated above.

## 2023-12-11 NOTE — H&P ADULT - PROBLEM SELECTOR PLAN 4
- continue with finasteride and tamsulosin - f/u lipid profile   - not currently on any statins - Hold home PO metformin  - Mod ISS  - F/u POCT, A1c

## 2023-12-11 NOTE — PROGRESS NOTE ADULT - PROBLEM SELECTOR PLAN 1
- unclear etiology, but possibly related to Tick borne illness given duration of symptoms, along with central clearing rash behind right calf, and leukopenia + thrombocytopenia   - EKG shows NSR  - continue doxycyline   - f/u tick panel, blood cultures, EBV, CMV   - consult ID this morning   - will send peripheral smear - unclear etiology, but possibly related to Tick borne illness given duration of symptoms, along with central clearing rash behind right calf, and leukopenia + thrombocytopenia   - EKG shows NSR  - continue doxycyline   - f/u tick panel, blood cultures, EBV, CMV   - ID consulted, f/u recs  - will send peripheral smear - Fever, unclear etiology, but possibly related to Tick borne illness given duration of symptoms, along with central clearing rash behind right calf, and leukopenia + thrombocytopenia   - EKG shows NSR  - continue doxycyline   - f/u tick panel, blood cultures, EBV, CMV   - ID consulted, f/u recs  - will send peripheral smear

## 2023-12-11 NOTE — H&P ADULT - TIME BILLING
81 minutes spent on total encounter which excludes time spent teaching.    Independently obtaining a history, performing a physical examination, discussing the plan with the patient, ordering medications/tests, documenting clinical information, and coordinating care.

## 2023-12-11 NOTE — CONSULT NOTE ADULT - ASSESSMENT
73-year-old man with a history of hypertension hyperlipidemia diabetes EtOH abuse dementia admitted 12/11/23 with chills and malaise. He recently came from  12/1/23 and was hospitalized at [facility] 12/1 --> 12/8/23 with MICHAELA, transminitis, and thrombocytopenia. He currently is lethargic, withdrawn with leukopenia, thrombocytopenia, renal impairment, transaminitis, hepatic steatosis  12/1/23 BC x2 NEG  12.11 Mlarai PCR  NEG    I believe Dengue Hemorrhagic fever is most likely underlying etiology    Suggest  Dengue serology  Blood cultures  HIV, Quant gold tb  Brain CT  thiamine, folate supplementation  Hematology evaluation consider BM bx  , am cortisol    discussed with primary team 73-year-old man with a history of hypertension hyperlipidemia diabetes EtOH abuse dementia admitted 12/11/23 with chills and malaise. He recently came from  12/1/23 and was hospitalized at [facility] 12/1 --> 12/8/23 with MICHAELA, transminitis, and thrombocytopenia. He currently is lethargic, withdrawn with leukopenia, thrombocytopenia, renal impairment, transaminitis, hepatic steatosis  12/1/23 BC x2 NEG  12.11 Mlarai PCR  NEG    I believe Dengue Hemorrhagic fever is most likely underlying etiology    Suggest  Dengue serology  Blood cultures  HIV, Quant gold tb  Brain CT  thiamine, folate supplementation  Hematology evaluation consider BM bx  , am cortisol    discussed with primary team

## 2023-12-11 NOTE — PROGRESS NOTE ADULT - PROBLEM SELECTOR PLAN 3
son reports patient drink 1/2 bottle or more of hard liquor daily, last drink November 28  - has previously tried to quit  - never had withdrawals, outside window for CIWA   - SBIRT consult   - daily folic acid, thiamine   - abdominal ultrasound to r/o alcoholic hepatitis   - f/u hepatitis panel i/s/o transaminitis

## 2023-12-11 NOTE — H&P ADULT - PROBLEM SELECTOR PLAN 6
DVT ppx: none, i/s/o thrombocytopenia   Diet: DASH,TLC  Dispo: medically active - f/u lipid profile   - Cw atorvastatin

## 2023-12-11 NOTE — H&P ADULT - PROBLEM SELECTOR PLAN 5
DVT ppx: none, i/s/o thrombocytopenia   Diet: DASH,TLC  Dispo: medically active - continue with finasteride and tamsulosin - Cw amlodipine, pt was previously on lisinopril stopped due to MICHAELA in [place] - Cw amlodipine, pt was previously on lisinopril stopped due to MICHAELA in [place]

## 2023-12-11 NOTE — H&P ADULT - PROBLEM SELECTOR PLAN 8
Problem: Potential for Falls  Goal: Patient will remain free of falls  Description: INTERVENTIONS:  - Educate patient/family on patient safety including physical limitations  - Instruct patient to call for assistance with activity   - Consult OT/PT to assist with strengthening/mobility   - Keep Call bell within reach  - Keep bed low and locked with side rails adjusted as appropriate  - Keep care items and personal belongings within reach  - Initiate and maintain comfort rounds  - Make Fall Risk Sign visible to staff  - Offer Toileting every Hours, in advance of need  - Initiate/Maintain alarm  - Obtain necessary fall risk management equipment:   - Apply yellow socks and bracelet for high fall risk patients  - Consider moving patient to room near nurses station  Outcome: Progressing DVT ppx: none, i/s/o thrombocytopenia   Diet: DASH,TLC  Dispo: medically active

## 2023-12-11 NOTE — CONSULT NOTE ADULT - SUBJECTIVE AND OBJECTIVE BOX
Patient is a 73y old  Male who presents with a chief complaint of MICHAELA, elevated LFTs (11 Dec 2023 07:27)    HPI:  73-year-old male with a history of hypertension hyperlipidemia diabetes EtOH abuse dementia presenting with generalized weakness subjective fevers and decreased PO intake since he returned for the [place] on December 1st, Patient went to a hospital in the [place] for 1 episode of nausea and vomiting. Patient was subsequently released from the hospital and upon returning to the [facility] he went to [facility] for weakness and a fall. At that hospital he had a negative traumatic workup and was found to have acute kidney injury, He received IV fluids and his kidney function resolved.  Patient had renal ultrasounds which were unremarkable. Patient was diagnosed with transaminitis and pancytopenia likely secondary to alcohol. Other than weakness, patient denies any fever, chills, chest pain, shortness of breath, nausea, vomiting, abdominal pain, constipation, or diarrhea     On arrival, Temp 99.7, HR 86, /66, RR 16, SpO2 97% on room air. Labs remarkable for WBC 1.61, plt 49, PTT 39.8, , mg 1.3, BUN 44, creatinine 3.5, glucose 137, , . Chest xray showed clear lungs. In the ED, patient was given 1x dose of doxycycline and 1L of IVF.    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 and deeply engaged with grandchildren  12/1/23 he was fine when he left DR - but on arrival he was dizzy and was hospitalized at [place] 12/1 --> 12/8/23  records from [place] presently incomplete  12/1/23 WBC = 4.9; H/H = 13.4/37; plt = 78K  Blood cx x2 NEG; Urine Cx NEG  TSH = 1.03+Hep B Core AB; Pos Hep B sAb; NEG Hep B sAg; Fe/TIBC = 43/219; Ferritin 265, B12/folate = 523/14.6  12/2 AFP = 1.1  12/7/23 COVID NEG; CREAT = 4.24    There are no known ill contacts,   Hehas chills, lethargy, anorexia, withdrawal, and likely weight loss - baseline weight = 175#      PAST MEDICAL & SURGICAL HISTORY:  HTN (hypertension)  HLD (hyperlipidemia)  Diabetes  H/O ETOH abuse    Social history: , +etoh      REVIEW OF SYSTEMS:  CONSTITUTIONAL: + weakness,  chills  EYES/ENT: No visual changes;  No vertigo or throat pain   NECK: No pain or stiffness  RESPIRATORY: No cough, wheezing, hemoptysis; No shortness of breath  CARDIOVASCULAR: No chest pain or palpitations  GASTROINTESTINAL: No abdominal or epigastric pain. No nausea, vomiting, or hematemesis; No diarrhea or constipation. No melena or hematochezia.  GENITOURINARY: No dysuria, frequency or hematuria  NEUROLOGICAL: + weakness  SKIN: No itching, burning, rashes, or lesions   All other review of systems is negative unless indicated above    Allergies  No Known Allergies    Antimicrobials:  doxycycline IVPB 100 milliGRAM(s) IV Intermittent two times a day      Vital Signs Last 24 Hrs  T(C): 37.8 (11 Dec 2023 16:30), Max: 37.9 (10 Dec 2023 22:46)  T(F): 100.1 (11 Dec 2023 16:30), Max: 100.3 (10 Dec 2023 22:46)  HR: 78 (11 Dec 2023 16:30) (65 - 86)  BP: 143/77 (11 Dec 2023 16:30) (101/57 - 143/77)  BP(mean): 71 (11 Dec 2023 03:47) (71 - 87)  RR: 18 (11 Dec 2023 16:30) (16 - 18)  SpO2: 99% (11 Dec 2023 16:30) (95% - 100%)    Parameters below as of 11 Dec 2023 16:30  Patient On (Oxygen Delivery Method): room air        PHYSICAL EXAM:  General: WN/WD NAD, Non-toxic  Neurology: lethargic, withdrawn  Respiratory: Clear to auscultation bilaterally  CV: RRR, S1S2, no murmurs, rubs or gallops  Abdominal: Soft, Non-tender, non-distended, normal bowel sounds  Extremities: No edema  Line Sites: Clear  Skin: No rash                          13.1   1.59  )-----------( 37       ( 11 Dec 2023 07:02 )             39.1       12-11    140  |  103  |  38<H>  ----------------------------<  142<H>  3.7   |  27  |  2.79<H>    Ca    7.9<L>      11 Dec 2023 07:02  Phos  2.8     12-11  Mg     2.1     12-11    TPro  6.2  /  Alb  3.3  /  TBili  0.4  /  DBili  x   /  AST  271<H>  /  ALT  198<H>  /  AlkPhos  37<L>  12-11      MICROBIOLOGY:  .Blood Blood  12-11-23   NEGATIVE for Plasmodium antigens. Microscopy is performed for  confirmation.  The Malaria Rapid antigen test does not detect the  presence of Babesia species. If Babesiosis is suspected  please order test Babesia PCR: Babesia microti PCR Bld  ************************************************************  --  --    Radiology:  < from: US Abdomen Complete (US Abdomen Complete .) (12.11.23 @ 15:23) >  FINDINGS:  Liver: Right lobe measures 14.9 cm. Increased parenchymal echogenicity.   Liver contour is smooth.  Bile ducts: Normal caliber. Common bile duct measures 4 mm.  Gallbladder: No evidence of stones, borderline wall thickening, or   pericholecystic fluid. Negative sonographic Leung's sign according to   technologist report. No pain medication given.  Pancreas: Visualized portions are within normal limits. Pancreatic duct   measures 1.7 mm.  Spleen: 9.5 cm. Within normal limits.  Right kidney: 11.4 cm. No hydronephrosis. Increased echogenicity.  Left kidney: 11.3 cm. No hydronephrosis. Increased echogenicity.  Ascites: None.  Aorta and IVC: Visualized portions are within normal limits.    IMPRESSION:  Bilaterally increased renal echogenicity, suggestive of chronic medical   renal disease.    Suggestion of diffuse hepatic steatosis.    < end of copied text >  < from: Xray Chest 1 View- PORTABLE-Urgent (Xray Chest 1 View- PORTABLE-Urgent .) (12.10.23 @ 23:43) >  IMPRESSION:  Clear lungs.    < end of copied text >    Umberto Moreno MD; Division of Infectious Disease; Pager: [phone]; nights and weekends: [phone] Patient is a 73y old  Male who presents with a chief complaint of MICHAELA, elevated LFTs (11 Dec 2023 07:27)    HPI:  73-year-old male with a history of hypertension hyperlipidemia diabetes EtOH abuse dementia presenting with generalized weakness subjective fevers and decreased PO intake since he returned for the [place] on December 1st, Patient went to a hospital in the [place] for 1 episode of nausea and vomiting. Patient was subsequently released from the hospital and upon returning to the [facility] he went to [facility] for weakness and a fall. At that hospital he had a negative traumatic workup and was found to have acute kidney injury, He received IV fluids and his kidney function resolved.  Patient had renal ultrasounds which were unremarkable. Patient was diagnosed with transaminitis and pancytopenia likely secondary to alcohol. Other than weakness, patient denies any fever, chills, chest pain, shortness of breath, nausea, vomiting, abdominal pain, constipation, or diarrhea     On arrival, Temp 99.7, HR 86, /66, RR 16, SpO2 97% on room air. Labs remarkable for WBC 1.61, plt 49, PTT 39.8, , mg 1.3, BUN 44, creatinine 3.5, glucose 137, , . Chest xray showed clear lungs. In the ED, patient was given 1x dose of doxycycline and 1L of IVF.    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 and deeply engaged with grandchildren  12/1/23 he was fine when he left DR - but on arrival he was dizzy and was hospitalized at [place] 12/1 --> 12/8/23  records from [place] presently incomplete  12/1/23 WBC = 4.9; H/H = 13.4/37; plt = 78K  Blood cx x2 NEG; Urine Cx NEG  TSH = 1.03+Hep B Core AB; Pos Hep B sAb; NEG Hep B sAg; Fe/TIBC = 43/219; Ferritin 265, B12/folate = 523/14.6  12/2 AFP = 1.1  12/7/23 COVID NEG; CREAT = 4.24    There are no known ill contacts,   Hehas chills, lethargy, anorexia, withdrawal, and likely weight loss - baseline weight = 175#      PAST MEDICAL & SURGICAL HISTORY:  HTN (hypertension)  HLD (hyperlipidemia)  Diabetes  H/O ETOH abuse    Social history: , +etoh      REVIEW OF SYSTEMS:  CONSTITUTIONAL: + weakness,  chills  EYES/ENT: No visual changes;  No vertigo or throat pain   NECK: No pain or stiffness  RESPIRATORY: No cough, wheezing, hemoptysis; No shortness of breath  CARDIOVASCULAR: No chest pain or palpitations  GASTROINTESTINAL: No abdominal or epigastric pain. No nausea, vomiting, or hematemesis; No diarrhea or constipation. No melena or hematochezia.  GENITOURINARY: No dysuria, frequency or hematuria  NEUROLOGICAL: + weakness  SKIN: No itching, burning, rashes, or lesions   All other review of systems is negative unless indicated above    Allergies  No Known Allergies    Antimicrobials:  doxycycline IVPB 100 milliGRAM(s) IV Intermittent two times a day      Vital Signs Last 24 Hrs  T(C): 37.8 (11 Dec 2023 16:30), Max: 37.9 (10 Dec 2023 22:46)  T(F): 100.1 (11 Dec 2023 16:30), Max: 100.3 (10 Dec 2023 22:46)  HR: 78 (11 Dec 2023 16:30) (65 - 86)  BP: 143/77 (11 Dec 2023 16:30) (101/57 - 143/77)  BP(mean): 71 (11 Dec 2023 03:47) (71 - 87)  RR: 18 (11 Dec 2023 16:30) (16 - 18)  SpO2: 99% (11 Dec 2023 16:30) (95% - 100%)    Parameters below as of 11 Dec 2023 16:30  Patient On (Oxygen Delivery Method): room air        PHYSICAL EXAM:  General: WN/WD NAD, Non-toxic  Neurology: lethargic, withdrawn  Respiratory: Clear to auscultation bilaterally  CV: RRR, S1S2, no murmurs, rubs or gallops  Abdominal: Soft, Non-tender, non-distended, normal bowel sounds  Extremities: No edema  Line Sites: Clear  Skin: No rash                          13.1   1.59  )-----------( 37       ( 11 Dec 2023 07:02 )             39.1       12-11    140  |  103  |  38<H>  ----------------------------<  142<H>  3.7   |  27  |  2.79<H>    Ca    7.9<L>      11 Dec 2023 07:02  Phos  2.8     12-11  Mg     2.1     12-11    TPro  6.2  /  Alb  3.3  /  TBili  0.4  /  DBili  x   /  AST  271<H>  /  ALT  198<H>  /  AlkPhos  37<L>  12-11      MICROBIOLOGY:  .Blood Blood  12-11-23   NEGATIVE for Plasmodium antigens. Microscopy is performed for  confirmation.  The Malaria Rapid antigen test does not detect the  presence of Babesia species. If Babesiosis is suspected  please order test Babesia PCR: Babesia microti PCR Bld  ************************************************************  --  --    Radiology:  < from: US Abdomen Complete (US Abdomen Complete .) (12.11.23 @ 15:23) >  FINDINGS:  Liver: Right lobe measures 14.9 cm. Increased parenchymal echogenicity.   Liver contour is smooth.  Bile ducts: Normal caliber. Common bile duct measures 4 mm.  Gallbladder: No evidence of stones, borderline wall thickening, or   pericholecystic fluid. Negative sonographic Leung's sign according to   technologist report. No pain medication given.  Pancreas: Visualized portions are within normal limits. Pancreatic duct   measures 1.7 mm.  Spleen: 9.5 cm. Within normal limits.  Right kidney: 11.4 cm. No hydronephrosis. Increased echogenicity.  Left kidney: 11.3 cm. No hydronephrosis. Increased echogenicity.  Ascites: None.  Aorta and IVC: Visualized portions are within normal limits.    IMPRESSION:  Bilaterally increased renal echogenicity, suggestive of chronic medical   renal disease.    Suggestion of diffuse hepatic steatosis.    < end of copied text >  < from: Xray Chest 1 View- PORTABLE-Urgent (Xray Chest 1 View- PORTABLE-Urgent .) (12.10.23 @ 23:43) >  IMPRESSION:  Clear lungs.    < end of copied text >    Umberto Moreno MD; Division of Infectious Disease; Pager: [phone]; nights and weekends: [phone]

## 2023-12-11 NOTE — H&P ADULT - HISTORY OF PRESENT ILLNESS
73-year-old male with a history of hypertension hyperlipidemia diabetes EtOH abuse dementia presenting with generalized weakness subjective fevers and decreased PO intake since he returned for the [facility] [place] on December 1st, Patient went to a hospital in the [place] for 1 episode of nausea and vomiting. Patient was subsequently released from the hospital. and upon returning to the [facility] he went to [facility] for weakness and a fall. At that hospital he had a negative traumatic workup and was found to have acute kidney injury, He received IV fluids and his kidney function resolved.  Patient had renal ultrasounds which were unremarkable. Patient was diagnosed with transaminitis and pancytopenia likely secondary to alcohol. Other than weakness, patient denies any fever, chills, chest pain, shortness of breath, nausea, vomiting, abdominal pain, constipation, or diarrhea     On arrival, Temp 99.7, HR 86, /66, RR 16, SpO2 97% on room air. Labs remarkable for WBC 1.61, plt 49, PTT 39.8, , mg 1.3, BUN 44, creatinine 3.5, glucose 137, ,     73-year-old male with a history of hypertension hyperlipidemia diabetes EtOH abuse dementia presenting with generalized weakness subjective fevers and decreased PO intake since he returned for the [facility] [place] on December 1st, Patient went to a hospital in the [place] for 1 episode of nausea and vomiting. Patient was subsequently released from the hospital. and upon returning to the [facility] he went to [facility] for weakness and a fall. At that hospital he had a negative traumatic workup and was found to have acute kidney injury, He received IV fluids and his kidney function resolved.  Patient had renal ultrasounds which were unremarkable. Patient was diagnosed with transaminitis and pancytopenia likely secondary to alcohol. Other than weakness, patient denies any fever, chills, chest pain, shortness of breath, nausea, vomiting, abdominal pain, constipation, or diarrhea     On arrival, Temp 99.7, HR 86, /66, RR 16, SpO2 97% on room air. Labs remarkable for WBC 1.61, plt 49, PTT 39.8, , mg 1.3, BUN 44, creatinine 3.5, glucose 137, ,     73-year-old male with a history of hypertension hyperlipidemia diabetes EtOH abuse dementia presenting with generalized weakness subjective fevers and decreased PO intake since he returned for the [place] on December 1st, Patient went to a hospital in the [place] for 1 episode of nausea and vomiting. Patient was subsequently released from the hospital. and upon returning to the [facility] he went to [facility] for weakness and a fall. At that hospital he had a negative traumatic workup and was found to have acute kidney injury, He received IV fluids and his kidney function resolved.  Patient had renal ultrasounds which were unremarkable. Patient was diagnosed with transaminitis and pancytopenia likely secondary to alcohol. Other than weakness, patient denies any fever, chills, chest pain, shortness of breath, nausea, vomiting, abdominal pain, constipation, or diarrhea     On arrival, Temp 99.7, HR 86, /66, RR 16, SpO2 97% on room air. Labs remarkable for WBC 1.61, plt 49, PTT 39.8, , mg 1.3, BUN 44, creatinine 3.5, glucose 137, , . Chest xray showed clear lungs. In the ED, patient was given 1x dose of doxycycline and 1L of IVF.    73-year-old male with a history of hypertension hyperlipidemia diabetes EtOH abuse dementia presenting with generalized weakness subjective fevers and decreased PO intake since he returned for the [place] on December 1st, Patient went to a hospital in the [place] for 1 episode of nausea and vomiting. Patient was subsequently released from the hospital. and upon returning to the [facility] he went to [facility] for weakness and a fall. At that hospital he had a negative traumatic workup and was found to have acute kidney injury, He received IV fluids and his kidney function resolved.  Patient had renal ultrasounds which were unremarkable. Patient was diagnosed with transaminitis and pancytopenia likely secondary to alcohol. Other than weakness, patient denies any fever, chills, chest pain, shortness of breath, nausea, vomiting, abdominal pain, constipation, or diarrhea     On arrival, Temp 99.7, HR 86, /66, RR 16, SpO2 97% on room air. Labs remarkable for WBC 1.61, plt 49, PTT 39.8, , mg 1.3, BUN 44, creatinine 3.5, glucose 137, , . Chest xray showed clear lungs. In the ED, patient was given 1x dose of doxycycline and 1L of IVF.    73-year-old male with a history of hypertension hyperlipidemia diabetes EtOH abuse dementia presenting with generalized weakness subjective fevers and decreased PO intake since he returned for the [place] on December 1st, Patient went to a hospital in the [place] for 1 episode of nausea and vomiting. Patient was subsequently released from the hospital and upon returning to the [place] he went to [facility] for weakness and a fall. At that hospital he had a negative traumatic workup and was found to have acute kidney injury, He received IV fluids and his kidney function resolved.  Patient had renal ultrasounds which were unremarkable. Patient was diagnosed with transaminitis and pancytopenia likely secondary to alcohol. Other than weakness, patient denies any fever, chills, chest pain, shortness of breath, nausea, vomiting, abdominal pain, constipation, or diarrhea     On arrival, Temp 99.7, HR 86, /66, RR 16, SpO2 97% on room air. Labs remarkable for WBC 1.61, plt 49, PTT 39.8, , mg 1.3, BUN 44, creatinine 3.5, glucose 137, , . Chest xray showed clear lungs. In the ED, patient was given 1x dose of doxycycline and 1L of IVF.    73-year-old male with a history of hypertension hyperlipidemia diabetes EtOH abuse dementia presenting with generalized weakness subjective fevers and decreased PO intake since he returned for the [place] on December 1st, Patient went to a hospital in the [place] for 1 episode of nausea and vomiting. Patient was subsequently released from the hospital and upon returning to the [place] he went to [facility] for weakness and a fall. At that hospital he had a negative traumatic workup and was found to have acute kidney injury, He received IV fluids and his kidney function resolved.  Patient had renal ultrasounds which were unremarkable. Patient was diagnosed with transaminitis and pancytopenia likely secondary to alcohol. Other than weakness, patient denies any fever, chills, chest pain, shortness of breath, nausea, vomiting, abdominal pain, constipation, or diarrhea     On arrival, Temp 99.7, HR 86, /66, RR 16, SpO2 97% on room air. Labs remarkable for WBC 1.61, plt 49, PTT 39.8, , mg 1.3, BUN 44, creatinine 3.5, glucose 137, , . Chest xray showed clear lungs. In the ED, patient was given 1x dose of doxycycline and 1L of IVF.

## 2023-12-11 NOTE — H&P ADULT - NSHPSOCIALHISTORY_GEN_ALL_CORE
Per son, patient drinks 1/2 bottle to 1 bottle of hard liquor daily, last drink was 11/28. No tobacco use, no illicit substance use.

## 2023-12-11 NOTE — PROGRESS NOTE ADULT - ATTENDING COMMENTS
73-year-old male with a history of hypertension hyperlipidemia, DM, EtOH use disorder, Dementia presented with generalized weakness , subjective fevers and decreased PO intake since he returned for the [place] on December 1st, Patient went to a hospital in the [place] for 1 episode of nausea and vomiting. Patient was subsequently released from the hospital and upon returning to the [facility] he went to [facility] for weakness and a fall. At that hospital he had a negative traumatic workup and was found to have acute kidney injury, He received IV fluids and his kidney function resolved.  Patient had renal ultrasounds which were unremarkable. Patient was diagnosed with transaminitis and pancytopenia likely secondary to alcohol.  On arrival, Temp 99.7, HR 86, /66, RR 16, SpO2 97% on room air. Labs remarkable for WBC 1.61, plt 49, PTT 39.8, , mg 1.3, BUN 44, creatinine 3.5, glucose 137, , . Chest xray showed clear lungs. In the ED, patient was given 1x dose of doxycycline and 1L of IVF and admitted for further evaluation and treatment   # Fever, Bicytopenia/ SIRS in a traveler has a very broad Diff Diagnosis     --> pt was started on DOxy which will cont    --> f/up Blood CX, blood smear and serologies and monitor for response   of note patient patient has H/O ETOH use disorder which could be a confounder/ cont to closely monitor   WIll eventually get PT eval once able    Lexis Johnson   Hospitalist   available on TEAMS 73-year-old male with a history of hypertension hyperlipidemia, DM, EtOH use disorder, Dementia presented with generalized weakness , subjective fevers and decreased PO intake since he returned for the [place] on December 1st, Patient went to a hospital in the [place] for 1 episode of nausea and vomiting. Patient was subsequently released from the hospital and upon returning to the [facility] he went to [facility] for weakness and a fall. At that hospital he had a negative traumatic workup and was found to have acute kidney injury, He received IV fluids and his kidney function resolved.  Patient had renal ultrasounds which were unremarkable. Patient was diagnosed with transaminitis and pancytopenia likely secondary to alcohol.  On arrival, Temp 99.7, HR 86, /66, RR 16, SpO2 97% on room air. Labs remarkable for WBC 1.61, plt 49, PTT 39.8, , mg 1.3, BUN 44, creatinine 3.5, glucose 137, , . Chest xray showed clear lungs. In the ED, patient was given 1x dose of doxycycline and 1L of IVF and admitted for further evaluation and treatment   # Fever, Bicytopenia/ SIRS in a traveler has a very broad Diff Diagnosis     --> pt was started on DOxy which will cont    --> f/up Blood CX, blood smear and serologies and monitor for response   of note patient patient has H/O ETOH use disorder which could be a confounder/ cont to closely monitor   WIll eventually get PT eval once able    Lexis Johnson   Hospitalist   available on TEAMS

## 2023-12-11 NOTE — H&P ADULT - ATTENDING COMMENTS
Patient was seen and examined at bedside. Patient is a poor historian, not very cooperative. Has possible bug bite on back of R calf. Concern for tick borne illness raised by CDC in  which is where patient is from. Will work up for tick borne illness in setting of thrombocytopenia, leukopenia, elevated LFTs and MICHAELA and cover empirically with doxycycline for now. ID and Nephro consulted. F/u Abdominal US. F/u urine studies, hepatitis panel and peripheral smear. Pt is an alcoholic but has not had alcohol in many days will observe for changes in CIWA but should be out of withdrawal period. Will supplement with folate and thiamine.

## 2023-12-11 NOTE — PROGRESS NOTE ADULT - PROBLEM SELECTOR PLAN 2
creatinine on admission was 3.5  - f/u abdominal u/s   - f/u urine studies   - nephrology consult creatinine on admission was 3.5 ->2.8 after 1L ivf  - c/w maintenance fluids 75cc/hr and continue to monitor  - FeNa consistent with pre-renal rigo   - f/u abdominal u/s   - f/u urine studies   - nephrology consult

## 2023-12-11 NOTE — H&P ADULT - PROBLEM SELECTOR PLAN 1
- unclear etiology, but possibly related to Tick borne illness given duration of symptoms, along with central clearing rash behind right calf, and leukopenia + thrombocytopenia   - EKG shows NSR  - continue doxycyline   - f/u tick panel, and blood cultures - unclear etiology, but possibly related to Tick borne illness given duration of symptoms, along with central clearing rash behind right calf, and leukopenia + thrombocytopenia   - EKG shows NSR  - continue doxycyline   - f/u tick panel, blood cultures, EBV, CMV   - consult ID this morning   - will send peripheral smear

## 2023-12-11 NOTE — PROGRESS NOTE ADULT - SUBJECTIVE AND OBJECTIVE BOX
INTERNAL MEDICINE PROGRESS NOTE  Amandeep Encinas MD  Please contact via TEAMS    Patient is a 73y old  Male who presents with a chief complaint of     SUBJECTIVE / OVERNIGHT EVENTS::  No acute overnight events. Pt seen and examined. Denies fevers, chills, CP, SOB, Abdominal pain, N/V, Constipation, Diarrhea      =================Meds=================  MEDICATIONS  (STANDING):  donepezil 5 milliGRAM(s) Oral at bedtime  doxycycline IVPB 100 milliGRAM(s) IV Intermittent two times a day  finasteride 5 milliGRAM(s) Oral daily  folic acid Injectable 1 milliGRAM(s) IV Push daily  tamsulosin 0.4 milliGRAM(s) Oral at bedtime  thiamine Injectable 100 milliGRAM(s) IV Push daily    MEDICATIONS  (PRN):      I&O's Summary      =================Vitals=================  Vital Signs Last 24 Hrs  T(C): 37.7 (11 Dec 2023 03:47), Max: 37.9 (10 Dec 2023 22:46)  T(F): 99.8 (11 Dec 2023 03:47), Max: 100.3 (10 Dec 2023 22:46)  HR: 73 (11 Dec 2023 03:47) (67 - 86)  BP: 101/57 (11 Dec 2023 03:47) (101/57 - 120/71)  BP(mean): 71 (11 Dec 2023 03:47) (71 - 87)  RR: 18 (11 Dec 2023 03:47) (16 - 18)  SpO2: 95% (11 Dec 2023 03:47) (95% - 100%)    Parameters below as of 11 Dec 2023 03:47  Patient On (Oxygen Delivery Method): room air        =================PHYSICAL EXAM=================   General: NAD, well groomed, well developed   Head: atraumatic, normocephalic   Eyes: sclera and conjunctiva clear   ENMT; slightly dry mucous membranes  Neck: no JVD, no tender lymphadenopathy   Lungs: clear lung fields bilaterally, no wheezes, rales, or rhonchi  Heart: regular rate and rhythm, normal s1,s2, no murmurs, rubs, or gallop   Extremities: warm, well perfused, no lower extremity edema  Skin: right calf with raised papule with central clearing, non-erythematous   Neuro: AOx2, no focal neurological deficits      ===================LABS=======================                        13.1   1.59  )-----------( 37       ( 11 Dec 2023 07:02 )             39.1     Auto Eosinophil # 0.01  / Auto Eosinophil % 0.6   / Auto Neutrophil # 0.88  / Auto Neutrophil % 55.4  / BANDS % x                            13.8   1.61  )-----------( 49       ( 10 Dec 2023 22:53 )             42.0     Auto Eosinophil # 0.00  / Auto Eosinophil % 0.0   / Auto Neutrophil # 1.04  / Auto Neutrophil % 64.3  / BANDS % x        12-10    140  |  101  |  44<H>  ----------------------------<  137<H>  3.9   |  27  |  3.50<H>    Ca    8.2<L>      10 Dec 2023 22:53  Mg     1.3     12-11  TPro  6.6  /  Alb  3.5  /  TBili  0.3  /  DBili  0.2  /  AST  299<H>  /  ALT  221<H>  /  AlkPhos  40  12-10    PT/INR - ( 10 Dec 2023 22:53 )   PT: 11.7 sec;   INR: 1.12 ratio         PTT - ( 10 Dec 2023 22:53 )  PTT:39.8 sec  CARDIAC MARKERS ( 11 Dec 2023 00:55 )  x     / x     / 142 U/L / x     / x          Urinalysis Basic - ( 11 Dec 2023 01:02 )    Color: Yellow / Appearance: Clear / S.017 / pH: x  Gluc: x / Ketone: Negative mg/dL  / Bili: Negative / Urobili: 0.2 mg/dL   Blood: x / Protein: Trace mg/dL / Nitrite: Negative   Leuk Esterase: Negative / RBC: 0 /HPF / WBC 2 /HPF   Sq Epi: x / Non Sq Epi: 8 /HPF / Bacteria: Negative /HPF          ===============Radiology & Additional Tests==================         INTERNAL MEDICINE PROGRESS NOTE  Amandeep Encinas MD  Please contact via TEAMS    Patient is a 73y old  Male who presents with a chief complaint of     SUBJECTIVE / OVERNIGHT EVENTS::  No acute overnight events. Pt seen and examined. Pt refused to participate in interview or answer questions. Even with , pt does not answer any questions.      =================Meds=================  MEDICATIONS  (STANDING):  donepezil 5 milliGRAM(s) Oral at bedtime  doxycycline IVPB 100 milliGRAM(s) IV Intermittent two times a day  finasteride 5 milliGRAM(s) Oral daily  folic acid Injectable 1 milliGRAM(s) IV Push daily  tamsulosin 0.4 milliGRAM(s) Oral at bedtime  thiamine Injectable 100 milliGRAM(s) IV Push daily    MEDICATIONS  (PRN):      I&O's Summary      =================Vitals=================  Vital Signs Last 24 Hrs  T(C): 37.7 (11 Dec 2023 03:47), Max: 37.9 (10 Dec 2023 22:46)  T(F): 99.8 (11 Dec 2023 03:47), Max: 100.3 (10 Dec 2023 22:46)  HR: 73 (11 Dec 2023 03:47) (67 - 86)  BP: 101/57 (11 Dec 2023 03:47) (101/57 - 120/71)  BP(mean): 71 (11 Dec 2023 03:47) (71 - 87)  RR: 18 (11 Dec 2023 03:47) (16 - 18)  SpO2: 95% (11 Dec 2023 03:47) (95% - 100%)    Parameters below as of 11 Dec 2023 03:47  Patient On (Oxygen Delivery Method): room air        =================PHYSICAL EXAM=================  General: NAD, well groomed, well developed   Head: atraumatic, normocephalic   Eyes: sclera and conjunctiva clear   ENMT; slightly dry mucous membranes  Neck: no JVD, no tender lymphadenopathy   Lungs: clear lung fields bilaterally, no wheezes, rales, or rhonchi  Heart: regular rate and rhythm, normal s1,s2, no murmurs, rubs, or gallop   Extremities: warm, well perfused, no lower extremity edema  Skin: right calf with raised papule with central clearing, non-erythematous   Neuro: AOx2, no focal neurological deficits      ===================LABS=======================                        13.1   1.59  )-----------( 37       ( 11 Dec 2023 07:02 )             39.1     Auto Eosinophil # 0.01  / Auto Eosinophil % 0.6   / Auto Neutrophil # 0.88  / Auto Neutrophil % 55.4  / BANDS % x                            13.8   1.61  )-----------( 49       ( 10 Dec 2023 22:53 )             42.0     Auto Eosinophil # 0.00  / Auto Eosinophil % 0.0   / Auto Neutrophil # 1.04  / Auto Neutrophil % 64.3  / BANDS % x        12-10    140  |  101  |  44<H>  ----------------------------<  137<H>  3.9   |  27  |  3.50<H>    Ca    8.2<L>      10 Dec 2023 22:53  Mg     1.3     12-11  TPro  6.6  /  Alb  3.5  /  TBili  0.3  /  DBili  0.2  /  AST  299<H>  /  ALT  221<H>  /  AlkPhos  40  12-10    PT/INR - ( 10 Dec 2023 22:53 )   PT: 11.7 sec;   INR: 1.12 ratio         PTT - ( 10 Dec 2023 22:53 )  PTT:39.8 sec  CARDIAC MARKERS ( 11 Dec 2023 00:55 )  x     / x     / 142 U/L / x     / x          Urinalysis Basic - ( 11 Dec 2023 01:02 )    Color: Yellow / Appearance: Clear / S.017 / pH: x  Gluc: x / Ketone: Negative mg/dL  / Bili: Negative / Urobili: 0.2 mg/dL   Blood: x / Protein: Trace mg/dL / Nitrite: Negative   Leuk Esterase: Negative / RBC: 0 /HPF / WBC 2 /HPF   Sq Epi: x / Non Sq Epi: 8 /HPF / Bacteria: Negative /HPF          ===============Radiology & Additional Tests==================

## 2023-12-11 NOTE — H&P ADULT - ASSESSMENT
73-year-old male with a history of hypertension hyperlipidemia diabetes EtOH abuse dementia who is being admitted for generalized weakness and poor PO intake over the last 2-3 weeks

## 2023-12-11 NOTE — H&P ADULT - NSHPLABSRESULTS_GEN_ALL_CORE
LABS:                          13.8   1.61  )-----------( 49       ( 10 Dec 2023 22:53 )             42.0     12-10    140  |  101  |  44<H>  ----------------------------<  137<H>  3.9   |  27  |  3.50<H>    Ca    8.2<L>      10 Dec 2023 22:53  Mg     1.3     12-11    TPro  6.6  /  Alb  3.5  /  TBili  0.3  /  DBili  0.2  /  AST  299<H>  /  ALT  221<H>  /  AlkPhos  40  12-10    LIVER FUNCTIONS - ( 10 Dec 2023 22:53 )  Alb: 3.5 g/dL / Pro: 6.6 g/dL / ALK PHOS: 40 U/L / ALT: 221 U/L / AST: 299 U/L / GGT: x           PT/INR - ( 10 Dec 2023 22:53 )   PT: 11.7 sec;   INR: 1.12 ratio         PTT - ( 10 Dec 2023 22:53 )  PTT:39.8 sec  Urinalysis Basic - ( 11 Dec 2023 01:02 )    Color: Yellow / Appearance: Clear / S.017 / pH: x  Gluc: x / Ketone: Negative mg/dL  / Bili: Negative / Urobili: 0.2 mg/dL   Blood: x / Protein: Trace mg/dL / Nitrite: Negative   Leuk Esterase: Negative / RBC: 0 /HPF / WBC 2 /HPF   Sq Epi: x / Non Sq Epi: 8 /HPF / Bacteria: Negative /HPF

## 2023-12-11 NOTE — PATIENT PROFILE ADULT - FALL HARM RISK - RISK INTERVENTIONS
Assistance OOB with selected safe patient handling equipment/Assistance with ambulation/Communicate Fall Risk and Risk Factors to all staff, patient, and family/Reinforce activity limits and safety measures with patient and family/Visual Cue: Yellow wristband/Bed in lowest position, wheels locked, appropriate side rails in place/Call bell, personal items and telephone in reach/Instruct patient to call for assistance before getting out of bed or chair/Non-slip footwear when patient is out of bed/[place] to call system/Physically safe environment - no spills, clutter or unnecessary equipment/Purposeful Proactive Rounding/Room/bathroom lighting operational, light cord in reach Assistance OOB with selected safe patient handling equipment/Assistance with ambulation/Communicate Fall Risk and Risk Factors to all staff, patient, and family/Reinforce activity limits and safety measures with patient and family/Visual Cue: Yellow wristband/Bed in lowest position, wheels locked, appropriate side rails in place/Call bell, personal items and telephone in reach/Instruct patient to call for assistance before getting out of bed or chair/Non-slip footwear when patient is out of bed/[place] to call system/Physically safe environment - no spills, clutter or unnecessary equipment/Purposeful Proactive Rounding/Room/bathroom lighting operational, light cord in reach

## 2023-12-12 LAB
A PHAGOCYTOPH DNA BLD QL NAA+PROBE: NEGATIVE — SIGNIFICANT CHANGE UP
A PHAGOCYTOPH DNA BLD QL NAA+PROBE: NEGATIVE — SIGNIFICANT CHANGE UP
A1C WITH ESTIMATED AVERAGE GLUCOSE RESULT: 6.1 % — HIGH (ref 4–5.6)
A1C WITH ESTIMATED AVERAGE GLUCOSE RESULT: 6.1 % — HIGH (ref 4–5.6)
ALBUMIN SERPL ELPH-MCNC: 3.2 G/DL — LOW (ref 3.3–5)
ALBUMIN SERPL ELPH-MCNC: 3.2 G/DL — LOW (ref 3.3–5)
ALP SERPL-CCNC: 37 U/L — LOW (ref 40–120)
ALP SERPL-CCNC: 37 U/L — LOW (ref 40–120)
ALT FLD-CCNC: 162 U/L — HIGH (ref 10–45)
ALT FLD-CCNC: 162 U/L — HIGH (ref 10–45)
ANION GAP SERPL CALC-SCNC: 12 MMOL/L — SIGNIFICANT CHANGE UP (ref 5–17)
ANION GAP SERPL CALC-SCNC: 12 MMOL/L — SIGNIFICANT CHANGE UP (ref 5–17)
APTT BLD: 39 SEC — HIGH (ref 24.5–35.6)
APTT BLD: 39 SEC — HIGH (ref 24.5–35.6)
AST SERPL-CCNC: 234 U/L — HIGH (ref 10–40)
AST SERPL-CCNC: 234 U/L — HIGH (ref 10–40)
B BURGDOR C6 AB SER-ACNC: NEGATIVE — SIGNIFICANT CHANGE UP
B BURGDOR C6 AB SER-ACNC: NEGATIVE — SIGNIFICANT CHANGE UP
B BURGDOR IGG+IGM SER-ACNC: 0.64 INDEX — SIGNIFICANT CHANGE UP (ref 0.01–0.89)
B BURGDOR IGG+IGM SER-ACNC: 0.64 INDEX — SIGNIFICANT CHANGE UP (ref 0.01–0.89)
BASOPHILS # BLD AUTO: 0.02 K/UL — SIGNIFICANT CHANGE UP (ref 0–0.2)
BASOPHILS # BLD AUTO: 0.02 K/UL — SIGNIFICANT CHANGE UP (ref 0–0.2)
BASOPHILS NFR BLD AUTO: 0.9 % — SIGNIFICANT CHANGE UP (ref 0–2)
BASOPHILS NFR BLD AUTO: 0.9 % — SIGNIFICANT CHANGE UP (ref 0–2)
BILIRUB SERPL-MCNC: 0.4 MG/DL — SIGNIFICANT CHANGE UP (ref 0.2–1.2)
BILIRUB SERPL-MCNC: 0.4 MG/DL — SIGNIFICANT CHANGE UP (ref 0.2–1.2)
BLD GP AB SCN SERPL QL: NEGATIVE — SIGNIFICANT CHANGE UP
BLD GP AB SCN SERPL QL: NEGATIVE — SIGNIFICANT CHANGE UP
BUN SERPL-MCNC: 32 MG/DL — HIGH (ref 7–23)
BUN SERPL-MCNC: 32 MG/DL — HIGH (ref 7–23)
CALCIUM SERPL-MCNC: 8.8 MG/DL — SIGNIFICANT CHANGE UP (ref 8.4–10.5)
CALCIUM SERPL-MCNC: 8.8 MG/DL — SIGNIFICANT CHANGE UP (ref 8.4–10.5)
CHLORIDE SERPL-SCNC: 103 MMOL/L — SIGNIFICANT CHANGE UP (ref 96–108)
CHLORIDE SERPL-SCNC: 103 MMOL/L — SIGNIFICANT CHANGE UP (ref 96–108)
CMV IGG FLD QL: 3.5 U/ML — HIGH
CMV IGG FLD QL: 3.5 U/ML — HIGH
CMV IGG SERPL-IMP: POSITIVE
CMV IGG SERPL-IMP: POSITIVE
CMV IGM FLD-ACNC: <8 AU/ML — SIGNIFICANT CHANGE UP
CMV IGM FLD-ACNC: <8 AU/ML — SIGNIFICANT CHANGE UP
CMV IGM SERPL QL: NEGATIVE — SIGNIFICANT CHANGE UP
CMV IGM SERPL QL: NEGATIVE — SIGNIFICANT CHANGE UP
CO2 SERPL-SCNC: 25 MMOL/L — SIGNIFICANT CHANGE UP (ref 22–31)
CO2 SERPL-SCNC: 25 MMOL/L — SIGNIFICANT CHANGE UP (ref 22–31)
CREAT SERPL-MCNC: 2.17 MG/DL — HIGH (ref 0.5–1.3)
CREAT SERPL-MCNC: 2.17 MG/DL — HIGH (ref 0.5–1.3)
CULTURE RESULTS: NO GROWTH — SIGNIFICANT CHANGE UP
CULTURE RESULTS: NO GROWTH — SIGNIFICANT CHANGE UP
CULTURE RESULTS: SIGNIFICANT CHANGE UP
CULTURE RESULTS: SIGNIFICANT CHANGE UP
E CHAFFEENSIS DNA BLD QL NAA+PROBE: NEGATIVE — SIGNIFICANT CHANGE UP
E CHAFFEENSIS DNA BLD QL NAA+PROBE: NEGATIVE — SIGNIFICANT CHANGE UP
E EWINGII DNA SPEC QL NAA+PROBE: NEGATIVE — SIGNIFICANT CHANGE UP
E EWINGII DNA SPEC QL NAA+PROBE: NEGATIVE — SIGNIFICANT CHANGE UP
EBV EA AB SER IA-ACNC: <5 U/ML — SIGNIFICANT CHANGE UP
EBV EA AB SER IA-ACNC: <5 U/ML — SIGNIFICANT CHANGE UP
EBV EA AB TITR SER IF: POSITIVE
EBV EA AB TITR SER IF: POSITIVE
EBV EA IGG SER-ACNC: NEGATIVE — SIGNIFICANT CHANGE UP
EBV EA IGG SER-ACNC: NEGATIVE — SIGNIFICANT CHANGE UP
EBV NA IGG SER IA-ACNC: 28.1 U/ML — HIGH
EBV NA IGG SER IA-ACNC: 28.1 U/ML — HIGH
EBV PATRN SPEC IB-IMP: SIGNIFICANT CHANGE UP
EBV PATRN SPEC IB-IMP: SIGNIFICANT CHANGE UP
EBV VCA IGG AVIDITY SER QL IA: POSITIVE
EBV VCA IGG AVIDITY SER QL IA: POSITIVE
EBV VCA IGM SER IA-ACNC: 86.4 U/ML — HIGH
EBV VCA IGM SER IA-ACNC: 86.4 U/ML — HIGH
EBV VCA IGM SER IA-ACNC: <10 U/ML — SIGNIFICANT CHANGE UP
EBV VCA IGM SER IA-ACNC: <10 U/ML — SIGNIFICANT CHANGE UP
EBV VCA IGM TITR FLD: NEGATIVE — SIGNIFICANT CHANGE UP
EBV VCA IGM TITR FLD: NEGATIVE — SIGNIFICANT CHANGE UP
EGFR: 31 ML/MIN/1.73M2 — LOW
EGFR: 31 ML/MIN/1.73M2 — LOW
EHRLICHIA DNA SPEC QL NAA+PROBE: NEGATIVE — SIGNIFICANT CHANGE UP
EHRLICHIA DNA SPEC QL NAA+PROBE: NEGATIVE — SIGNIFICANT CHANGE UP
EOSINOPHIL # BLD AUTO: 0.03 K/UL — SIGNIFICANT CHANGE UP (ref 0–0.5)
EOSINOPHIL # BLD AUTO: 0.03 K/UL — SIGNIFICANT CHANGE UP (ref 0–0.5)
EOSINOPHIL NFR BLD AUTO: 1.3 % — SIGNIFICANT CHANGE UP (ref 0–6)
EOSINOPHIL NFR BLD AUTO: 1.3 % — SIGNIFICANT CHANGE UP (ref 0–6)
ESTIMATED AVERAGE GLUCOSE: 128 MG/DL — HIGH (ref 68–114)
ESTIMATED AVERAGE GLUCOSE: 128 MG/DL — HIGH (ref 68–114)
GLUCOSE BLDC GLUCOMTR-MCNC: 103 MG/DL — HIGH (ref 70–99)
GLUCOSE BLDC GLUCOMTR-MCNC: 103 MG/DL — HIGH (ref 70–99)
GLUCOSE BLDC GLUCOMTR-MCNC: 111 MG/DL — HIGH (ref 70–99)
GLUCOSE BLDC GLUCOMTR-MCNC: 111 MG/DL — HIGH (ref 70–99)
GLUCOSE BLDC GLUCOMTR-MCNC: 154 MG/DL — HIGH (ref 70–99)
GLUCOSE BLDC GLUCOMTR-MCNC: 154 MG/DL — HIGH (ref 70–99)
GLUCOSE BLDC GLUCOMTR-MCNC: 157 MG/DL — HIGH (ref 70–99)
GLUCOSE BLDC GLUCOMTR-MCNC: 157 MG/DL — HIGH (ref 70–99)
GLUCOSE SERPL-MCNC: 106 MG/DL — HIGH (ref 70–99)
GLUCOSE SERPL-MCNC: 106 MG/DL — HIGH (ref 70–99)
HCT VFR BLD CALC: 35.9 % — LOW (ref 39–50)
HCT VFR BLD CALC: 35.9 % — LOW (ref 39–50)
HCT VFR BLD CALC: 40.1 % — SIGNIFICANT CHANGE UP (ref 39–50)
HCT VFR BLD CALC: 40.1 % — SIGNIFICANT CHANGE UP (ref 39–50)
HCV AB S/CO SERPL IA: 0.11 S/CO — SIGNIFICANT CHANGE UP (ref 0–0.99)
HCV AB S/CO SERPL IA: 0.11 S/CO — SIGNIFICANT CHANGE UP (ref 0–0.99)
HCV AB SERPL-IMP: SIGNIFICANT CHANGE UP
HCV AB SERPL-IMP: SIGNIFICANT CHANGE UP
HCV RNA FLD QL NAA+PROBE: SIGNIFICANT CHANGE UP
HCV RNA FLD QL NAA+PROBE: SIGNIFICANT CHANGE UP
HCV RNA SPEC QL PROBE+SIG AMP: SIGNIFICANT CHANGE UP
HCV RNA SPEC QL PROBE+SIG AMP: SIGNIFICANT CHANGE UP
HGB BLD-MCNC: 12 G/DL — LOW (ref 13–17)
HGB BLD-MCNC: 12 G/DL — LOW (ref 13–17)
HGB BLD-MCNC: 13.3 G/DL — SIGNIFICANT CHANGE UP (ref 13–17)
HGB BLD-MCNC: 13.3 G/DL — SIGNIFICANT CHANGE UP (ref 13–17)
HIV 1+2 AB+HIV1 P24 AG SERPL QL IA: SIGNIFICANT CHANGE UP
HIV 1+2 AB+HIV1 P24 AG SERPL QL IA: SIGNIFICANT CHANGE UP
IMM GRANULOCYTES NFR BLD AUTO: 1.8 % — HIGH (ref 0–0.9)
IMM GRANULOCYTES NFR BLD AUTO: 1.8 % — HIGH (ref 0–0.9)
INR BLD: 1.07 RATIO — SIGNIFICANT CHANGE UP (ref 0.85–1.18)
INR BLD: 1.07 RATIO — SIGNIFICANT CHANGE UP (ref 0.85–1.18)
LYMPHOCYTES # BLD AUTO: 1.19 K/UL — SIGNIFICANT CHANGE UP (ref 1–3.3)
LYMPHOCYTES # BLD AUTO: 1.19 K/UL — SIGNIFICANT CHANGE UP (ref 1–3.3)
LYMPHOCYTES # BLD AUTO: 52.7 % — HIGH (ref 13–44)
LYMPHOCYTES # BLD AUTO: 52.7 % — HIGH (ref 13–44)
MAGNESIUM SERPL-MCNC: 1.6 MG/DL — SIGNIFICANT CHANGE UP (ref 1.6–2.6)
MAGNESIUM SERPL-MCNC: 1.6 MG/DL — SIGNIFICANT CHANGE UP (ref 1.6–2.6)
MCHC RBC-ENTMCNC: 29.6 PG — SIGNIFICANT CHANGE UP (ref 27–34)
MCHC RBC-ENTMCNC: 33.2 GM/DL — SIGNIFICANT CHANGE UP (ref 32–36)
MCHC RBC-ENTMCNC: 33.2 GM/DL — SIGNIFICANT CHANGE UP (ref 32–36)
MCHC RBC-ENTMCNC: 33.4 GM/DL — SIGNIFICANT CHANGE UP (ref 32–36)
MCHC RBC-ENTMCNC: 33.4 GM/DL — SIGNIFICANT CHANGE UP (ref 32–36)
MCV RBC AUTO: 88.4 FL — SIGNIFICANT CHANGE UP (ref 80–100)
MCV RBC AUTO: 88.4 FL — SIGNIFICANT CHANGE UP (ref 80–100)
MCV RBC AUTO: 89.3 FL — SIGNIFICANT CHANGE UP (ref 80–100)
MCV RBC AUTO: 89.3 FL — SIGNIFICANT CHANGE UP (ref 80–100)
MONOCYTES # BLD AUTO: 0.26 K/UL — SIGNIFICANT CHANGE UP (ref 0–0.9)
MONOCYTES # BLD AUTO: 0.26 K/UL — SIGNIFICANT CHANGE UP (ref 0–0.9)
MONOCYTES NFR BLD AUTO: 11.5 % — SIGNIFICANT CHANGE UP (ref 2–14)
MONOCYTES NFR BLD AUTO: 11.5 % — SIGNIFICANT CHANGE UP (ref 2–14)
NEUTROPHILS # BLD AUTO: 0.72 K/UL — LOW (ref 1.8–7.4)
NEUTROPHILS # BLD AUTO: 0.72 K/UL — LOW (ref 1.8–7.4)
NEUTROPHILS NFR BLD AUTO: 31.8 % — LOW (ref 43–77)
NEUTROPHILS NFR BLD AUTO: 31.8 % — LOW (ref 43–77)
NRBC # BLD: 0 /100 WBCS — SIGNIFICANT CHANGE UP (ref 0–0)
PHOSPHATE SERPL-MCNC: 2.3 MG/DL — LOW (ref 2.5–4.5)
PHOSPHATE SERPL-MCNC: 2.3 MG/DL — LOW (ref 2.5–4.5)
PLATELET # BLD AUTO: 26 K/UL — LOW (ref 150–400)
PLATELET # BLD AUTO: 26 K/UL — LOW (ref 150–400)
PLATELET # BLD AUTO: 27 K/UL — LOW (ref 150–400)
PLATELET # BLD AUTO: 27 K/UL — LOW (ref 150–400)
POTASSIUM SERPL-MCNC: 4 MMOL/L — SIGNIFICANT CHANGE UP (ref 3.5–5.3)
POTASSIUM SERPL-MCNC: 4 MMOL/L — SIGNIFICANT CHANGE UP (ref 3.5–5.3)
POTASSIUM SERPL-SCNC: 4 MMOL/L — SIGNIFICANT CHANGE UP (ref 3.5–5.3)
POTASSIUM SERPL-SCNC: 4 MMOL/L — SIGNIFICANT CHANGE UP (ref 3.5–5.3)
PROT SERPL-MCNC: 6.2 G/DL — SIGNIFICANT CHANGE UP (ref 6–8.3)
PROT SERPL-MCNC: 6.2 G/DL — SIGNIFICANT CHANGE UP (ref 6–8.3)
PROTHROM AB SERPL-ACNC: 11.2 SEC — SIGNIFICANT CHANGE UP (ref 9.5–13)
PROTHROM AB SERPL-ACNC: 11.2 SEC — SIGNIFICANT CHANGE UP (ref 9.5–13)
R RICKETTSI AB SER-ACNC: NEGATIVE — SIGNIFICANT CHANGE UP
R RICKETTSI AB SER-ACNC: NEGATIVE — SIGNIFICANT CHANGE UP
R RICKETTSI IGM SER-ACNC: 2.52 INDEX — HIGH (ref 0–0.89)
R RICKETTSI IGM SER-ACNC: 2.52 INDEX — HIGH (ref 0–0.89)
RBC # BLD: 4.06 M/UL — LOW (ref 4.2–5.8)
RBC # BLD: 4.06 M/UL — LOW (ref 4.2–5.8)
RBC # BLD: 4.49 M/UL — SIGNIFICANT CHANGE UP (ref 4.2–5.8)
RBC # BLD: 4.49 M/UL — SIGNIFICANT CHANGE UP (ref 4.2–5.8)
RBC # FLD: 12.3 % — SIGNIFICANT CHANGE UP (ref 10.3–14.5)
RBC # FLD: 12.3 % — SIGNIFICANT CHANGE UP (ref 10.3–14.5)
RBC # FLD: 12.4 % — SIGNIFICANT CHANGE UP (ref 10.3–14.5)
RBC # FLD: 12.4 % — SIGNIFICANT CHANGE UP (ref 10.3–14.5)
RH IG SCN BLD-IMP: POSITIVE — SIGNIFICANT CHANGE UP
RH IG SCN BLD-IMP: POSITIVE — SIGNIFICANT CHANGE UP
RICK SF IGG TITR SER IF: NEGATIVE — SIGNIFICANT CHANGE UP
RICK SF IGG TITR SER IF: NEGATIVE — SIGNIFICANT CHANGE UP
RICK SF IGM TITR SER IF: 2.52 INDEX — HIGH (ref 0–0.89)
RICK SF IGM TITR SER IF: 2.52 INDEX — HIGH (ref 0–0.89)
SODIUM SERPL-SCNC: 140 MMOL/L — SIGNIFICANT CHANGE UP (ref 135–145)
SODIUM SERPL-SCNC: 140 MMOL/L — SIGNIFICANT CHANGE UP (ref 135–145)
SPECIMEN SOURCE: SIGNIFICANT CHANGE UP
WBC # BLD: 2.26 K/UL — LOW (ref 3.8–10.5)
WBC # BLD: 2.26 K/UL — LOW (ref 3.8–10.5)
WBC # BLD: 2.7 K/UL — LOW (ref 3.8–10.5)
WBC # BLD: 2.7 K/UL — LOW (ref 3.8–10.5)
WBC # FLD AUTO: 2.26 K/UL — LOW (ref 3.8–10.5)
WBC # FLD AUTO: 2.26 K/UL — LOW (ref 3.8–10.5)
WBC # FLD AUTO: 2.7 K/UL — LOW (ref 3.8–10.5)
WBC # FLD AUTO: 2.7 K/UL — LOW (ref 3.8–10.5)

## 2023-12-12 PROCEDURE — 99233 SBSQ HOSP IP/OBS HIGH 50: CPT | Mod: GC

## 2023-12-12 PROCEDURE — 99232 SBSQ HOSP IP/OBS MODERATE 35: CPT

## 2023-12-12 RX ORDER — SODIUM CHLORIDE 9 MG/ML
1000 INJECTION INTRAMUSCULAR; INTRAVENOUS; SUBCUTANEOUS ONCE
Refills: 0 | Status: COMPLETED | OUTPATIENT
Start: 2023-12-12 | End: 2023-12-12

## 2023-12-12 RX ADMIN — Medication 2: at 12:31

## 2023-12-12 RX ADMIN — Medication 1 MILLIGRAM(S): at 11:36

## 2023-12-12 RX ADMIN — TAMSULOSIN HYDROCHLORIDE 0.4 MILLIGRAM(S): 0.4 CAPSULE ORAL at 21:58

## 2023-12-12 RX ADMIN — FINASTERIDE 5 MILLIGRAM(S): 5 TABLET, FILM COATED ORAL at 11:36

## 2023-12-12 RX ADMIN — ATORVASTATIN CALCIUM 10 MILLIGRAM(S): 80 TABLET, FILM COATED ORAL at 21:59

## 2023-12-12 RX ADMIN — Medication 100 MILLIGRAM(S): at 06:07

## 2023-12-12 RX ADMIN — Medication 100 MILLIGRAM(S): at 11:36

## 2023-12-12 RX ADMIN — Medication 63.75 MILLIMOLE(S): at 10:39

## 2023-12-12 RX ADMIN — Medication 650 MILLIGRAM(S): at 22:30

## 2023-12-12 RX ADMIN — Medication 650 MILLIGRAM(S): at 21:58

## 2023-12-12 RX ADMIN — DONEPEZIL HYDROCHLORIDE 5 MILLIGRAM(S): 10 TABLET, FILM COATED ORAL at 21:58

## 2023-12-12 RX ADMIN — SODIUM CHLORIDE 1000 MILLILITER(S): 9 INJECTION INTRAMUSCULAR; INTRAVENOUS; SUBCUTANEOUS at 10:39

## 2023-12-12 RX ADMIN — PANTOPRAZOLE SODIUM 40 MILLIGRAM(S): 20 TABLET, DELAYED RELEASE ORAL at 06:08

## 2023-12-12 NOTE — PHYSICAL THERAPY INITIAL EVALUATION ADULT - ADDITIONAL COMMENTS
Pt resides in an apartment with spouse, no steps to negotiate. PTA pt was independent with all functional mobility & did not use an AD for ambulation.

## 2023-12-12 NOTE — PROGRESS NOTE ADULT - PROBLEM SELECTOR PLAN 1
- Fever, unclear etiology, but possibly related to Tick borne illness given duration of symptoms, along with central clearing rash behind right calf, and leukopenia + thrombocytopenia   - EKG shows NSR  - continue doxycyline   - f/u tick panel, blood cultures, EBV, CMV   - ID consulted, appreciate recs  - peripheral smear pending  - EBV negative, CMV negative  - dengue pending  - blood culture prelim negative  - CT head negative for acute pathology  - quant gold pending, HIV pending - Fever, unclear etiology, but possibly related to Tick borne illness given duration of symptoms, along with central clearing rash behind right calf, and leukopenia + thrombocytopenia   - EKG shows NSR  - continue doxycyline   - f/u tick panel, blood cultures, EBV, CMV   - ID consulted, appreciate recs  - peripheral smear pending  - EBV negative, CMV negative  - dengue pending  - blood culture prelim negative  - CT head negative for acute pathology  - quant gold pending, HIV pending  - Neuro consulted, appreciate recs

## 2023-12-12 NOTE — PROGRESS NOTE ADULT - SUBJECTIVE AND OBJECTIVE BOX
Patient is a 73y old  Male who presents with a chief complaint of MICHAELA, elevated LFTs (12 Dec 2023 07:34)    Being followed by ID for fever   used    Interval history:some body aches  No abd pain  No headache  No rash  No acute events      ROS:  No cough,SOB,CP  No N/V/D./abd pain  No other complaints      Antimicrobials:    doxycycline IVPB 100 milliGRAM(s) IV Intermittent two times a day    Other medications reviewed  MEDICATIONS  (STANDING):  amLODIPine   Tablet 5 milliGRAM(s) Oral daily  atorvastatin 10 milliGRAM(s) Oral at bedtime  dextrose 5%. 1000 milliLiter(s) (50 mL/Hr) IV Continuous <Continuous>  dextrose 5%. 1000 milliLiter(s) (100 mL/Hr) IV Continuous <Continuous>  dextrose 50% Injectable 25 Gram(s) IV Push once  dextrose 50% Injectable 12.5 Gram(s) IV Push once  dextrose 50% Injectable 25 Gram(s) IV Push once  donepezil 5 milliGRAM(s) Oral at bedtime  doxycycline IVPB 100 milliGRAM(s) IV Intermittent two times a day  finasteride 5 milliGRAM(s) Oral daily  folic acid 1 milliGRAM(s) Oral daily  glucagon  Injectable 1 milliGRAM(s) IntraMuscular once  insulin lispro (ADMELOG) corrective regimen sliding scale   SubCutaneous three times a day before meals  lactated ringers. 1000 milliLiter(s) (75 mL/Hr) IV Continuous <Continuous>  pantoprazole    Tablet 40 milliGRAM(s) Oral before breakfast  tamsulosin 0.4 milliGRAM(s) Oral at bedtime  thiamine 100 milliGRAM(s) Oral daily    Vital Signs Last 24 Hrs  T(C): 36.6 (12-12-23 @ 04:45), Max: 37.8 (12-11-23 @ 16:30)  T(F): 97.9 (12-12-23 @ 04:45), Max: 100.1 (12-11-23 @ 16:30)  HR: 78 (12-12-23 @ 04:45) (65 - 78)  BP: 101/63 (12-12-23 @ 04:45) (101/63 - 143/77)  BP(mean): --  RR: 18 (12-12-23 @ 04:45) (18 - 18)  SpO2: 98% (12-12-23 @ 04:45) (96% - 99%)    Physical Exam:        HEENT PERRLA EOMI    No oral exudate or erythema    Chest Good AE,CTA    CVS RRR S1 S2 WNl No murmur or rub or gallop    Abd soft BS normal No tenderness no masses    IV site no erythema tenderness or discharge    CNS AAO X 3 no focal  skin no rash    Lab Data:                          13.3   2.26  )-----------( 27       ( 12 Dec 2023 06:53 )             40.1     WBC Count: 2.26 (12-12-23 @ 06:53)  WBC Count: 1.59 (12-11-23 @ 07:02)  WBC Count: 1.61 (12-10-23 @ 22:53)    12-12    140  |  103  |  32<H>  ----------------------------<  106<H>  4.0   |  25  |  2.17<H>      Creatinine: 2.17 mg/dL (12-12-23 @ 06:56)  Creatinine: 2.79 mg/dL (12-11-23 @ 07:02)  Creatinine: 3.50 mg/dL (12-10-23 @ 22:53)    Ca    8.8      12 Dec 2023 06:56  Phos  2.3     12-12  Mg     1.6     12-12    TPro  6.2  /  Alb  3.2<L>  /  TBili  0.4  /  DBili  x   /  AST  234<H>  /  ALT  162<H>  /  AlkPhos  37<L>  12-12    Aspartate Aminotransferase (AST/SGOT): 299 U/L (12.10.23 @ 22:53) Alanine Aminotransferase (ALT/SGPT): 221 U/L (12.10.23 @ 22:53)     Babesia microti PCR, Bld (collected 11 Dec 2023 03:12)  Babesia microti PCR, Bld Result: NotDetec (12.11.23 @ 03:1    Culture - Blood (collected 10 Dec 2023 22:39)  Source: .Blood Blood  Preliminary Report (12 Dec 2023 07:02):    No growth at 24 hours    Culture - Blood (collected 10 Dec 2023 22:30)  Source: .Blood Blood  Preliminary Report (12 Dec 2023 07:02):    No growth at 24 hours        Hepatitis C Virus Interpretation: Nonreact:   Hepatitis C AB Hepatitis B Core IgM Antibody: Nonreact (12.11.23 @ 03:12  ) Hepatitis B Surface Antigen: Nonreact (12.11.23 @ 03:12  Blood Parasites- Travel Associated . (12.11.23 @ 13:17)   Specimen Source: .Blood Blood  Culture Results:   No Blood Parasites observed by giemsa stain ) Hepatitis A IgM Antibody: Nonreact (12.11.23 @ 03:12)     < from: CT Head No Cont (12.11.23 @ 22:10) >  Impression: Encephalomalacia/gliosis of the right frontal lobe, likely   sequela of previous parenchymal hemorrhage.  No acute intracranial abnormality.    --- End of Report ---      < end of copied text >  < from: US Abdomen Complete (US Abdomen Complete .) (12.11.23 @ 15:23) >    IMPRESSION:  Bilaterally increased renal echogenicity, suggestive of chronic medical   renal disease.    Suggestion of diffuse hepatic steatosis.    < end of copied text >  < from: Xray Chest 1 View- PORTABLE-Urgent (Xray Chest 1 View- PORTABLE-Urgent .) (12.10.23 @ 23:43) >    IMPRESSION:  Clear lungs.    --- End of Report ---    < end of copied text >

## 2023-12-12 NOTE — PROVIDER CONTACT NOTE (CRITICAL VALUE NOTIFICATION) - SITUATION
[facility] spotted fever antibody negative, Antibody IgM 2.52 [facility] spotted fever antibody negative, Antibody IgM 2.52

## 2023-12-12 NOTE — PROGRESS NOTE ADULT - ATTENDING COMMENTS
73-year-old man with a history of hypertension hyperlipidemia diabetes EtOH abuse dementia admitted 12/11/23 with chills and malaise. He recently came from  12/1/23 and was hospitalized at [facility] 12/1 --> 12/8/23 with MICHAELA, transaminitis, and thrombocytopenia. He currently is lethargic, withdrawn with leukopenia, thrombocytopenia, renal impairment, transaminitis, hepatic steatosis  12/1/23 BC x2 NEG  12/11 Malaria  PCR  NEG; Hep A Ig M NEG; CMV IgG+; CMV IgM NEG: +EBV consistent with remote infection; NEG LYME< NEG TICK associated PCR panel; Brain CT with area of frontal enceophalomalacia  12/12/23 Hgb A1C = 6.1%;  Hep C NEG; HEP C RNA: None detected; HIV NEG;     I believe Dengue Hemorrhagic fever is most likely underlying etiology  12/12 improved appearance, improved labs  -  could this represent resolving Dengue?    Suggest  STOP Doxycycline 12/11 --> 12/12  trend CBC, CMP  await Dengue serology  If thrombocytopenia persists would pursue Hematology evaluation for BM bx 73-year-old man with a history of hypertension hyperlipidemia diabetes EtOH abuse dementia admitted 12/11/23 with chills and malaise. He recently came from  12/1/23 and was hospitalized at [facility] 12/1 --> 12/8/23 with MICHAELA, transaminitis, and thrombocytopenia. He currently is lethargic, withdrawn with leukopenia, thrombocytopenia, renal impairment, transaminitis, hepatic steatosis  12/1/23 BC x2 NEG  12/11 Malaria  PCR  NEG; Hep A Ig M NEG; CMV IgG+; CMV IgM NEG: +EBV consistent with remote infection; NEG LYME< NEG TICK associated PCR panel; Brain CT with area of frontal enceophalomalacia  12/12/23 Hgb A1C = 6.1%;  Hep C NEG; HEP C RNA: None detected; HIV NEG;     I believe Dengue Hemorrhagic fever is most likely underlying etiology  12/12 improved appearance, improved labs  -  could this represent resolving Dengue?    Suggest  STOP Doxycycline 12/11 --> 12/12  trend CBC, CMP  await Dengue serology  If thrombocytopenia persists would pursue Hematology evaluation for BM bx

## 2023-12-12 NOTE — PROGRESS NOTE ADULT - ASSESSMENT
73-year-old man with a history of hypertension hyperlipidemia diabetes EtOH abuse dementia admitted 12/11/23 with chills and malaise. He recently came from  12/1/23 and was hospitalized at [facility] 12/1 --> 12/8/23 with MICHAELA, transminitis, and thrombocytopenia. He currently is lethargic, withdrawn with leukopenia, thrombocytopenia, renal impairment, transaminitis, hepatic steatosis    ?hepatitis  ?dengue vs hemorraghic fever  ?HIV  ?Pulm TB  ?malaria  ?stool PCR(though no diarrhea)    REc;  1) follow the dengue serology  2) we follow the malaria parasite  3)we follow the leptospirosis  4) follow HIV  5) Follow a CT chest,abd,pelvis when the creatinine is ok  6) follow the blood culltures  7)Am cortisol  8) ? hematology,GI   ?ETOH use in past  9)we follow the WBC,platelets and LFTs  we should DC the doxy  Monitor the patient    I will discuss with Dr Moreno  Will tailor plan for ID issues  per course,results.Will defer to primary team on management of other issues.   73-year-old man with a history of hypertension hyperlipidemia diabetes EtOH abuse dementia admitted 12/11/23 with chills and malaise. He recently came from  12/1/23 and was hospitalized at [facility] 12/1 --> 12/8/23 with MICHAELA, transminitis, and thrombocytopenia. He currently is lethargic, withdrawn with leukopenia, thrombocytopenia, renal impairment, transaminitis, hepatic steatosis    ?hepatitis  ?dengue vs hemorraghic fever  ?HIV  ?Pulm TB  ?malaria  ?stool PCR(though no diarrhea)    REc;  1) follow the dengue serology  2) we follow the malaria parasite  3)we follow the leptospirosis  4) follow HIV  5) Follow a CT chest,abd,pelvis when the creatinine is ok  6) follow the blood culltures  7)Am cortisol  8) ? hematology,GI   ?ETOH use in past  9)we follow the WBC,platelets and LFTs  we should DC the doxy  Monitor the patient    I will discuss with Dr Moreno  Will tailor plan for ID issues  per course,results.Will defer to primary team on management of other issues.   73-year-old man with a history of hypertension hyperlipidemia diabetes EtOH abuse dementia admitted 12/11/23 with chills and malaise. He recently came from  12/1/23 and was hospitalized at [facility] 12/1 --> 12/8/23 with MICHAELA, transminitis, and thrombocytopenia. He currently is lethargic, withdrawn with leukopenia, thrombocytopenia, renal impairment, transaminitis, hepatic steatosis      ?dengue vs hemorraghic fever  ?HIV  ?Pulm TB  ?malaria  ?hepatitis  ?stool PCR(though no diarrhea)    Rec;  1) follow the dengue serology  2) we follow the malaria parasite  3)we follow the leptospirosis  4) follow HIV  5) Follow a CT chest,abd,pelvis when the creatinine is ok  6) follow the blood culltures  7)Am cortisol  8) ? hematology,GI follow up  ?ETOH use in past  9)we follow the WBC,platelets and LFTs  we should DC the doxy  Monitor the patient    I will discuss with Dr Moreno  Will tailor plan for ID issues  per course,results.Will defer to primary team on management of other issues.   73-year-old man with a history of hypertension hyperlipidemia diabetes EtOH abuse dementia admitted 12/11/23 with chills and malaise. He recently came from  12/1/23 and was hospitalized at [facility] 12/1 --> 12/8/23 with MICHAELA, transminitis, and thrombocytopenia. He currently is lethargic, withdrawn with leukopenia, thrombocytopenia, renal impairment, transaminitis, hepatic steatosis      ?dengue vs hemorraghic fever  ?HIV  ?Pulm TB  ?malaria  ?hepatitis  ?stool PCR(though no diarrhea)    Rec;  1) follow the dengue serology  2) we follow the malaria parasite  3)we follow the leptospirosis  4) follow HIV  5) Follow a CT chest,abd,pelvis when the creatinine is ok  6) follow the blood culltures  7)Am cortisol  8) ? hematology,GI follow up  ?ETOH use in past  9)we follow the WBC,platelets and LFTs  we should DC the doxy  Monitor the patient    I will discuss with Dr Moreno  Will tailor plan for ID issues  per course,results.Will defer to primary team on management of other issues.

## 2023-12-12 NOTE — PROGRESS NOTE ADULT - PROBLEM SELECTOR PLAN 3
son reports patient drink 1/2 bottle or more of hard liquor daily, last drink November 28  - has previously tried to quit  - never had withdrawals, outside window for CIWA   - SBIRT consult   - daily folic acid, thiamine   - abdominal ultrasound to r/o alcoholic hepatitis   - f/u hepatitis panel i/s/o transaminitis son reports patient drink 1/2 bottle or more of hard liquor daily, last drink November 28  - has previously tried to quit  - never had withdrawals, outside window for CIWA   - SBIRT consult   - daily folic acid, thiamine   - abdominal ultrasound shows diffuse hepatic steatosis  - f/u hepatitis panel i/s/o transaminitis

## 2023-12-12 NOTE — PROGRESS NOTE ADULT - PROBLEM SELECTOR PLAN 2
creatinine on admission was 3.5 ->2.8 after 1L ivf  - c/w maintenance fluids 75cc/hr and continue to monitor  - FeNa consistent with pre-renal rigo   - abdominal u/s suggests ?chronic kidney disease 12/11  - urine studies suggest prerenal disease  - nephrology consult

## 2023-12-12 NOTE — PROGRESS NOTE ADULT - ATTENDING COMMENTS
73-year-old male with a history of hypertension hyperlipidemia, DM, EtOH use disorder, Dementia presented with generalized weakness , subjective fevers and decreased PO intake since he returned for the [place] on December 1st, Patient went to a hospital in the [place] for 1 episode of nausea and vomiting. Patient was subsequently released from the hospital and upon returning to the [place] he went to [facility] for weakness and a fall. At that hospital he had a negative traumatic workup and was found to have acute kidney injury, He received IV fluids and his kidney function resolved.  Patient had renal ultrasounds which were unremarkable. Patient was diagnosed with transaminitis and pancytopenia likely secondary to alcohol.  On arrival, Temp 99.7, HR 86, /66, RR 16, SpO2 97% on room air. Labs remarkable for WBC 1.61, plt 49, PTT 39.8, , mg 1.3, BUN 44, creatinine 3.5, glucose 137, , . Chest xray showed clear lungs. In the ED, patient was given 1x dose of doxycycline and 1L of IVF and admitted for further evaluation and treatment   # Fever, Bicytopenia/ SIRS in a traveler has a very broad Diff Diagnosis in the [place] with reported Dengue outbreak / Pt is hemodynamically stable     --> pt was started on DOxy which will cont    --> f/up Blood CX, blood smear and serologies and monitor for response   of note patient patient has H/O ETOH use disorder which could be a confounder/ cont to closely monitor   - Will get Neuro and Hem evaluation   Transfuse Plt if <10K or <15K with fever and <50 K if bleeding   WIll eventually get PT eval once able    Lexis Johnson   Hospitalist   available on TEAMS 73-year-old male with a history of hypertension hyperlipidemia, DM, EtOH use disorder, Dementia presented with generalized weakness , subjective fevers and decreased PO intake since he returned for the [place] on December 1st, Patient went to a hospital in the [place] for 1 episode of nausea and vomiting. Patient was subsequently released from the hospital and upon returning to the [place] he went to [facility] for weakness and a fall. At that hospital he had a negative traumatic workup and was found to have acute kidney injury, He received IV fluids and his kidney function resolved.  Patient had renal ultrasounds which were unremarkable. Patient was diagnosed with transaminitis and pancytopenia likely secondary to alcohol.  On arrival, Temp 99.7, HR 86, /66, RR 16, SpO2 97% on room air. Labs remarkable for WBC 1.61, plt 49, PTT 39.8, , mg 1.3, BUN 44, creatinine 3.5, glucose 137, , . Chest xray showed clear lungs. In the ED, patient was given 1x dose of doxycycline and 1L of IVF and admitted for further evaluation and treatment   # Fever, Bicytopenia/ SIRS in a traveler has a very broad Diff Diagnosis in the [place] with reported Dengue outbreak / Pt is hemodynamically stable     --> pt was started on DOxy which will cont    --> f/up Blood CX, blood smear and serologies and monitor for response   of note patient patient has H/O ETOH use disorder which could be a confounder/ cont to closely monitor   - Will get Neuro and Hem evaluation   Transfuse Plt if <10K or <15K with fever and <50 K if bleeding   WIll eventually get PT eval once able    Lexis Johnson   Hospitalist   available on TEAMS

## 2023-12-12 NOTE — PHYSICAL THERAPY INITIAL EVALUATION ADULT - PERTINENT HX OF CURRENT PROBLEM, REHAB EVAL
73 y.o. M PMH hypertension hyperlipidemia diabetes EtOH abuse dementia presenting with generalized weakness subjective fevers and decreased PO intake since he returned for the [place] on December 1st, Patient went to a hospital in the [place] for 1 episode of nausea and vomiting. Patient was subsequently released from the hospital and upon returning to the [facility] he went to [facility] for weakness and a fall. At that hospital he had a negative traumatic workup and was found to have acute kidney injury, He received IV fluids and his kidney function resolved.  Patient had renal ultrasounds which were unremarkable. Patient was diagnosed with transaminitis and pancytopenia likely secondary to alcohol. Other than weakness, patient denies any fever, chills, chest pain, shortness of breath, nausea, vomiting, abdominal pain, constipation, or diarrhea     On arrival, Temp 99.7, HR 86, /66, RR 16, SpO2 97% on room air. Labs remarkable for WBC 1.61, plt 49, PTT 39.8, , mg 1.3, BUN 44, creatinine 3.5, glucose 137, , . Chest xray showed clear lungs. In the ED, patient was given 1x dose of doxycycline and 1L of IVF. 73 y.o. M PMH hypertension hyperlipidemia diabetes EtOH abuse dementia presenting with generalized weakness subjective fevers and decreased PO intake since he returned for the [place] on December 1st, Patient went to a hospital in the [place] for 1 episode of nausea and vomiting. Patient was subsequently released from the hospital and upon returning to the [facility] he went to [facility] for weakness and a fall. At that hospital he had a negative traumatic workup and was found to have acute kidney injury, He received IV fluids and his kidney function resolved.  Patient had renal ultrasounds which were unremarkable. Patient was diagnosed with transaminitis and pancytopenia likely secondary to alcohol. Other than weakness, patient denies any fever, chills, chest pain, shortness of breath, nausea, vomiting, abdominal pain, constipation, or diarrhea     On arrival, Temp 99.7, HR 86, /66, RR 16, SpO2 97% on room air. Labs remarkable for WBC 1.61, plt 49, PTT 39.8, , mg 1.3, BUN 44, creatinine 3.5, glucose 137, , . Chest xray showed clear lungs. In the ED, patient was given 1x dose of doxycycline and 1L of IVF.

## 2023-12-12 NOTE — PROVIDER CONTACT NOTE (CRITICAL VALUE NOTIFICATION) - TEST AND RESULT REPORTED:
[facility] spotted fever antibody IgM 2.52 [facility] spotted fever antibody IgM 2.52

## 2023-12-12 NOTE — PROGRESS NOTE ADULT - SUBJECTIVE AND OBJECTIVE BOX
INTERNAL MEDICINE PROGRESS NOTE  Amandeep Encinas MD  Please contact via TEAMS    Patient is a 73y old  Male who presents with a chief complaint of MICHAELA, elevated LFTs (11 Dec 2023 17:55)      SUBJECTIVE / OVERNIGHT EVENTS::  No acute overnight events. Pt seen and examined. uncooperative with exam      =================Meds=================  MEDICATIONS  (STANDING):  amLODIPine   Tablet 5 milliGRAM(s) Oral daily  atorvastatin 10 milliGRAM(s) Oral at bedtime  dextrose 5%. 1000 milliLiter(s) (50 mL/Hr) IV Continuous <Continuous>  dextrose 5%. 1000 milliLiter(s) (100 mL/Hr) IV Continuous <Continuous>  dextrose 50% Injectable 25 Gram(s) IV Push once  dextrose 50% Injectable 12.5 Gram(s) IV Push once  dextrose 50% Injectable 25 Gram(s) IV Push once  donepezil 5 milliGRAM(s) Oral at bedtime  doxycycline IVPB 100 milliGRAM(s) IV Intermittent two times a day  finasteride 5 milliGRAM(s) Oral daily  folic acid 1 milliGRAM(s) Oral daily  glucagon  Injectable 1 milliGRAM(s) IntraMuscular once  insulin lispro (ADMELOG) corrective regimen sliding scale   SubCutaneous three times a day before meals  lactated ringers. 1000 milliLiter(s) (75 mL/Hr) IV Continuous <Continuous>  pantoprazole    Tablet 40 milliGRAM(s) Oral before breakfast  tamsulosin 0.4 milliGRAM(s) Oral at bedtime  thiamine 100 milliGRAM(s) Oral daily    MEDICATIONS  (PRN):  acetaminophen     Tablet .. 650 milliGRAM(s) Oral every 6 hours PRN Temp greater or equal to 38C (100.4F)  dextrose Oral Gel 15 Gram(s) Oral once PRN Blood Glucose LESS THAN 70 milliGRAM(s)/deciliter      I&O's Summary    11 Dec 2023 07:01  -  12 Dec 2023 07:00  --------------------------------------------------------  IN: 0 mL / OUT: 150 mL / NET: -150 mL        =================Vitals=================  Vital Signs Last 24 Hrs  T(C): 36.6 (12 Dec 2023 04:45), Max: 37.8 (11 Dec 2023 16:30)  T(F): 97.9 (12 Dec 2023 04:45), Max: 100.1 (11 Dec 2023 16:30)  HR: 78 (12 Dec 2023 04:45) (65 - 78)  BP: 101/63 (12 Dec 2023 04:45) (101/63 - 143/77)  BP(mean): --  RR: 18 (12 Dec 2023 04:45) (18 - 18)  SpO2: 98% (12 Dec 2023 04:45) (96% - 99%)    Parameters below as of 12 Dec 2023 04:45  Patient On (Oxygen Delivery Method): room air        =================PHYSICAL EXAM=================  General: NAD, well groomed, well developed, uncooperative  Head: atraumatic, normocephalic   Eyes: sclera and conjunctiva clear   ENMT; slightly dry mucous membranes  Neck: no JVD, no tender lymphadenopathy   Lungs: clear lung fields bilaterally, no wheezes, rales, or rhonchi  Heart: regular rate and rhythm, normal s1,s2, no murmurs, rubs, or gallop   Extremities: warm, well perfused, no lower extremity edema  Skin: right calf with raised papule with central clearing, non-erythematous   Neuro: AOx1, no focal neurological deficits      ===================LABS=======================                        13.3   2.26  )-----------( 27       ( 12 Dec 2023 06:53 )             40.1     Auto Eosinophil # 0.03  / Auto Eosinophil % 1.3   / Auto Neutrophil # 0.72  / Auto Neutrophil % 31.8  / BANDS % x                            13.1   1.59  )-----------( 37       ( 11 Dec 2023 07:02 )             39.1     Auto Eosinophil # 0.01  / Auto Eosinophil % 0.6   / Auto Neutrophil # 0.88  / Auto Neutrophil % 55.4  / BANDS % x                            13.8   1.61  )-----------( 49       ( 10 Dec 2023 22:53 )             42.0     Auto Eosinophil # 0.00  / Auto Eosinophil % 0.0   / Auto Neutrophil # 1.04  / Auto Neutrophil % 64.3  / BANDS % x        12-12    140  |  103  |  32<H>  ----------------------------<  106<H>  4.0   |  25  |  2.17<H>  12-11    140  |  103  |  38<H>  ----------------------------<  142<H>  3.7   |  27  |  2.79<H>  12-10    140  |  101  |  44<H>  ----------------------------<  137<H>  3.9   |  27  |  3.50<H>    Ca    8.8      12 Dec 2023 06:56  Mg     1.6     12-12  Phos  2.3     12-12  TPro  6.2  /  Alb  3.2<L>  /  TBili  0.4  /  DBili  x   /  AST  234<H>  /  ALT  162<H>  /  AlkPhos  37<L>  12-12  TPro  6.2  /  Alb  3.3  /  TBili  0.4  /  DBili  x   /  AST  271<H>  /  ALT  198<H>  /  AlkPhos  37<L>  12-11  TPro  6.6  /  Alb  3.5  /  TBili  0.3  /  DBili  0.2  /  AST  299<H>  /  ALT  221<H>  /  AlkPhos  40  12-10    PT/INR - ( 10 Dec 2023 22:53 )   PT: 11.7 sec;   INR: 1.12 ratio         PTT - ( 10 Dec 2023 22:53 )  PTT:39.8 sec  CARDIAC MARKERS ( 11 Dec 2023 00:55 )  x     / x     / 142 U/L / x     / x          Urinalysis Basic - ( 12 Dec 2023 06:56 )    Color: x / Appearance: x / SG: x / pH: x  Gluc: 106 mg/dL / Ketone: x  / Bili: x / Urobili: x   Blood: x / Protein: x / Nitrite: x   Leuk Esterase: x / RBC: x / WBC x   Sq Epi: x / Non Sq Epi: x / Bacteria: x          ===============Radiology & Additional Tests==================      Reviewed.

## 2023-12-13 ENCOUNTER — TRANSCRIPTION ENCOUNTER (OUTPATIENT)
Age: 73
End: 2023-12-13

## 2023-12-13 LAB
ALBUMIN SERPL ELPH-MCNC: 3.5 G/DL — SIGNIFICANT CHANGE UP (ref 3.3–5)
ALBUMIN SERPL ELPH-MCNC: 3.5 G/DL — SIGNIFICANT CHANGE UP (ref 3.3–5)
ALP SERPL-CCNC: 37 U/L — LOW (ref 40–120)
ALP SERPL-CCNC: 37 U/L — LOW (ref 40–120)
ALT FLD-CCNC: 128 U/L — HIGH (ref 10–45)
ALT FLD-CCNC: 128 U/L — HIGH (ref 10–45)
AMMONIA BLD-MCNC: 38 UMOL/L — SIGNIFICANT CHANGE UP (ref 11–55)
AMMONIA BLD-MCNC: 38 UMOL/L — SIGNIFICANT CHANGE UP (ref 11–55)
ANION GAP SERPL CALC-SCNC: 12 MMOL/L — SIGNIFICANT CHANGE UP (ref 5–17)
ANION GAP SERPL CALC-SCNC: 12 MMOL/L — SIGNIFICANT CHANGE UP (ref 5–17)
AST SERPL-CCNC: 173 U/L — HIGH (ref 10–40)
AST SERPL-CCNC: 173 U/L — HIGH (ref 10–40)
BASOPHILS # BLD AUTO: 0.03 K/UL — SIGNIFICANT CHANGE UP (ref 0–0.2)
BASOPHILS # BLD AUTO: 0.03 K/UL — SIGNIFICANT CHANGE UP (ref 0–0.2)
BASOPHILS NFR BLD AUTO: 0.9 % — SIGNIFICANT CHANGE UP (ref 0–2)
BASOPHILS NFR BLD AUTO: 0.9 % — SIGNIFICANT CHANGE UP (ref 0–2)
BILIRUB SERPL-MCNC: 0.4 MG/DL — SIGNIFICANT CHANGE UP (ref 0.2–1.2)
BILIRUB SERPL-MCNC: 0.4 MG/DL — SIGNIFICANT CHANGE UP (ref 0.2–1.2)
BLD GP AB SCN SERPL QL: NEGATIVE — SIGNIFICANT CHANGE UP
BLD GP AB SCN SERPL QL: NEGATIVE — SIGNIFICANT CHANGE UP
BUN SERPL-MCNC: 22 MG/DL — SIGNIFICANT CHANGE UP (ref 7–23)
BUN SERPL-MCNC: 22 MG/DL — SIGNIFICANT CHANGE UP (ref 7–23)
CALCIUM SERPL-MCNC: 8.7 MG/DL — SIGNIFICANT CHANGE UP (ref 8.4–10.5)
CALCIUM SERPL-MCNC: 8.7 MG/DL — SIGNIFICANT CHANGE UP (ref 8.4–10.5)
CHLORIDE SERPL-SCNC: 105 MMOL/L — SIGNIFICANT CHANGE UP (ref 96–108)
CHLORIDE SERPL-SCNC: 105 MMOL/L — SIGNIFICANT CHANGE UP (ref 96–108)
CLOSURE TME COLL+EPINEP BLD: 30 K/UL — LOW (ref 150–400)
CLOSURE TME COLL+EPINEP BLD: 30 K/UL — LOW (ref 150–400)
CO2 SERPL-SCNC: 25 MMOL/L — SIGNIFICANT CHANGE UP (ref 22–31)
CO2 SERPL-SCNC: 25 MMOL/L — SIGNIFICANT CHANGE UP (ref 22–31)
CREAT SERPL-MCNC: 1.65 MG/DL — HIGH (ref 0.5–1.3)
CREAT SERPL-MCNC: 1.65 MG/DL — HIGH (ref 0.5–1.3)
EGFR: 43 ML/MIN/1.73M2 — LOW
EGFR: 43 ML/MIN/1.73M2 — LOW
EOSINOPHIL # BLD AUTO: 0.08 K/UL — SIGNIFICANT CHANGE UP (ref 0–0.5)
EOSINOPHIL # BLD AUTO: 0.08 K/UL — SIGNIFICANT CHANGE UP (ref 0–0.5)
EOSINOPHIL NFR BLD AUTO: 2.7 % — SIGNIFICANT CHANGE UP (ref 0–6)
EOSINOPHIL NFR BLD AUTO: 2.7 % — SIGNIFICANT CHANGE UP (ref 0–6)
FOLATE SERPL-MCNC: 18.1 NG/ML — SIGNIFICANT CHANGE UP
FOLATE SERPL-MCNC: 18.1 NG/ML — SIGNIFICANT CHANGE UP
GIANT PLATELETS BLD QL SMEAR: PRESENT — SIGNIFICANT CHANGE UP
GIANT PLATELETS BLD QL SMEAR: PRESENT — SIGNIFICANT CHANGE UP
GLUCOSE BLDC GLUCOMTR-MCNC: 109 MG/DL — HIGH (ref 70–99)
GLUCOSE BLDC GLUCOMTR-MCNC: 114 MG/DL — HIGH (ref 70–99)
GLUCOSE BLDC GLUCOMTR-MCNC: 114 MG/DL — HIGH (ref 70–99)
GLUCOSE BLDC GLUCOMTR-MCNC: 94 MG/DL — SIGNIFICANT CHANGE UP (ref 70–99)
GLUCOSE BLDC GLUCOMTR-MCNC: 94 MG/DL — SIGNIFICANT CHANGE UP (ref 70–99)
GLUCOSE BLDC GLUCOMTR-MCNC: 99 MG/DL — SIGNIFICANT CHANGE UP (ref 70–99)
GLUCOSE BLDC GLUCOMTR-MCNC: 99 MG/DL — SIGNIFICANT CHANGE UP (ref 70–99)
GLUCOSE SERPL-MCNC: 101 MG/DL — HIGH (ref 70–99)
GLUCOSE SERPL-MCNC: 101 MG/DL — HIGH (ref 70–99)
HCT VFR BLD CALC: 38.1 % — LOW (ref 39–50)
HCT VFR BLD CALC: 38.1 % — LOW (ref 39–50)
HGB BLD-MCNC: 13.1 G/DL — SIGNIFICANT CHANGE UP (ref 13–17)
HGB BLD-MCNC: 13.1 G/DL — SIGNIFICANT CHANGE UP (ref 13–17)
LYMPHOCYTES # BLD AUTO: 0.97 K/UL — LOW (ref 1–3.3)
LYMPHOCYTES # BLD AUTO: 0.97 K/UL — LOW (ref 1–3.3)
LYMPHOCYTES # BLD AUTO: 32.7 % — SIGNIFICANT CHANGE UP (ref 13–44)
LYMPHOCYTES # BLD AUTO: 32.7 % — SIGNIFICANT CHANGE UP (ref 13–44)
MAGNESIUM SERPL-MCNC: 1.3 MG/DL — LOW (ref 1.6–2.6)
MAGNESIUM SERPL-MCNC: 1.3 MG/DL — LOW (ref 1.6–2.6)
MANUAL SMEAR VERIFICATION: SIGNIFICANT CHANGE UP
MANUAL SMEAR VERIFICATION: SIGNIFICANT CHANGE UP
MCHC RBC-ENTMCNC: 29.9 PG — SIGNIFICANT CHANGE UP (ref 27–34)
MCHC RBC-ENTMCNC: 29.9 PG — SIGNIFICANT CHANGE UP (ref 27–34)
MCHC RBC-ENTMCNC: 34.4 GM/DL — SIGNIFICANT CHANGE UP (ref 32–36)
MCHC RBC-ENTMCNC: 34.4 GM/DL — SIGNIFICANT CHANGE UP (ref 32–36)
MCV RBC AUTO: 87 FL — SIGNIFICANT CHANGE UP (ref 80–100)
MCV RBC AUTO: 87 FL — SIGNIFICANT CHANGE UP (ref 80–100)
MONOCYTES # BLD AUTO: 0.45 K/UL — SIGNIFICANT CHANGE UP (ref 0–0.9)
MONOCYTES # BLD AUTO: 0.45 K/UL — SIGNIFICANT CHANGE UP (ref 0–0.9)
MONOCYTES NFR BLD AUTO: 15 % — HIGH (ref 2–14)
MONOCYTES NFR BLD AUTO: 15 % — HIGH (ref 2–14)
NEUTROPHILS # BLD AUTO: 1.21 K/UL — LOW (ref 1.8–7.4)
NEUTROPHILS # BLD AUTO: 1.21 K/UL — LOW (ref 1.8–7.4)
NEUTROPHILS NFR BLD AUTO: 40.7 % — LOW (ref 43–77)
NEUTROPHILS NFR BLD AUTO: 40.7 % — LOW (ref 43–77)
PHOSPHATE SERPL-MCNC: 2.8 MG/DL — SIGNIFICANT CHANGE UP (ref 2.5–4.5)
PHOSPHATE SERPL-MCNC: 2.8 MG/DL — SIGNIFICANT CHANGE UP (ref 2.5–4.5)
PLAT MORPH BLD: NORMAL — SIGNIFICANT CHANGE UP
PLAT MORPH BLD: NORMAL — SIGNIFICANT CHANGE UP
PLATELET # BLD AUTO: 34 K/UL — LOW (ref 150–400)
PLATELET # BLD AUTO: 34 K/UL — LOW (ref 150–400)
POTASSIUM SERPL-MCNC: 3.7 MMOL/L — SIGNIFICANT CHANGE UP (ref 3.5–5.3)
POTASSIUM SERPL-MCNC: 3.7 MMOL/L — SIGNIFICANT CHANGE UP (ref 3.5–5.3)
POTASSIUM SERPL-SCNC: 3.7 MMOL/L — SIGNIFICANT CHANGE UP (ref 3.5–5.3)
POTASSIUM SERPL-SCNC: 3.7 MMOL/L — SIGNIFICANT CHANGE UP (ref 3.5–5.3)
PROT SERPL-MCNC: 6.5 G/DL — SIGNIFICANT CHANGE UP (ref 6–8.3)
PROT SERPL-MCNC: 6.5 G/DL — SIGNIFICANT CHANGE UP (ref 6–8.3)
RBC # BLD: 4.38 M/UL — SIGNIFICANT CHANGE UP (ref 4.2–5.8)
RBC # BLD: 4.38 M/UL — SIGNIFICANT CHANGE UP (ref 4.2–5.8)
RBC # FLD: 12.1 % — SIGNIFICANT CHANGE UP (ref 10.3–14.5)
RBC # FLD: 12.1 % — SIGNIFICANT CHANGE UP (ref 10.3–14.5)
RBC BLD AUTO: SIGNIFICANT CHANGE UP
RBC BLD AUTO: SIGNIFICANT CHANGE UP
RH IG SCN BLD-IMP: POSITIVE — SIGNIFICANT CHANGE UP
RH IG SCN BLD-IMP: POSITIVE — SIGNIFICANT CHANGE UP
SODIUM SERPL-SCNC: 142 MMOL/L — SIGNIFICANT CHANGE UP (ref 135–145)
SODIUM SERPL-SCNC: 142 MMOL/L — SIGNIFICANT CHANGE UP (ref 135–145)
VARIANT LYMPHS # BLD: 8 % — HIGH (ref 0–6)
VARIANT LYMPHS # BLD: 8 % — HIGH (ref 0–6)
VIT B12 SERPL-MCNC: 680 PG/ML — SIGNIFICANT CHANGE UP (ref 232–1245)
VIT B12 SERPL-MCNC: 680 PG/ML — SIGNIFICANT CHANGE UP (ref 232–1245)
WBC # BLD: 2.97 K/UL — LOW (ref 3.8–10.5)
WBC # BLD: 2.97 K/UL — LOW (ref 3.8–10.5)
WBC # FLD AUTO: 2.97 K/UL — LOW (ref 3.8–10.5)
WBC # FLD AUTO: 2.97 K/UL — LOW (ref 3.8–10.5)

## 2023-12-13 PROCEDURE — 99223 1ST HOSP IP/OBS HIGH 75: CPT

## 2023-12-13 PROCEDURE — 99232 SBSQ HOSP IP/OBS MODERATE 35: CPT

## 2023-12-13 PROCEDURE — 99233 SBSQ HOSP IP/OBS HIGH 50: CPT | Mod: GC

## 2023-12-13 RX ORDER — POLYETHYLENE GLYCOL 3350 17 G/17G
17 POWDER, FOR SOLUTION ORAL DAILY
Refills: 0 | Status: DISCONTINUED | OUTPATIENT
Start: 2023-12-13 | End: 2023-12-17

## 2023-12-13 RX ORDER — SENNA PLUS 8.6 MG/1
2 TABLET ORAL AT BEDTIME
Refills: 0 | Status: DISCONTINUED | OUTPATIENT
Start: 2023-12-13 | End: 2023-12-17

## 2023-12-13 RX ORDER — CHLORHEXIDINE GLUCONATE 213 G/1000ML
1 SOLUTION TOPICAL DAILY
Refills: 0 | Status: DISCONTINUED | OUTPATIENT
Start: 2023-12-13 | End: 2023-12-17

## 2023-12-13 RX ORDER — MAGNESIUM SULFATE 500 MG/ML
2 VIAL (ML) INJECTION ONCE
Refills: 0 | Status: COMPLETED | OUTPATIENT
Start: 2023-12-13 | End: 2023-12-13

## 2023-12-13 RX ADMIN — DONEPEZIL HYDROCHLORIDE 5 MILLIGRAM(S): 10 TABLET, FILM COATED ORAL at 21:22

## 2023-12-13 RX ADMIN — POLYETHYLENE GLYCOL 3350 17 GRAM(S): 17 POWDER, FOR SOLUTION ORAL at 17:54

## 2023-12-13 RX ADMIN — Medication 100 MILLIGRAM(S): at 17:34

## 2023-12-13 RX ADMIN — CHLORHEXIDINE GLUCONATE 1 APPLICATION(S): 213 SOLUTION TOPICAL at 17:34

## 2023-12-13 RX ADMIN — ATORVASTATIN CALCIUM 10 MILLIGRAM(S): 80 TABLET, FILM COATED ORAL at 21:22

## 2023-12-13 RX ADMIN — Medication 1 TABLET(S): at 17:54

## 2023-12-13 RX ADMIN — TAMSULOSIN HYDROCHLORIDE 0.4 MILLIGRAM(S): 0.4 CAPSULE ORAL at 21:21

## 2023-12-13 RX ADMIN — Medication 25 GRAM(S): at 14:17

## 2023-12-13 RX ADMIN — Medication 100 MILLIGRAM(S): at 11:27

## 2023-12-13 RX ADMIN — FINASTERIDE 5 MILLIGRAM(S): 5 TABLET, FILM COATED ORAL at 11:27

## 2023-12-13 RX ADMIN — Medication 1 MILLIGRAM(S): at 11:27

## 2023-12-13 RX ADMIN — Medication 100 MILLIGRAM(S): at 05:54

## 2023-12-13 RX ADMIN — SENNA PLUS 2 TABLET(S): 8.6 TABLET ORAL at 21:21

## 2023-12-13 RX ADMIN — PANTOPRAZOLE SODIUM 40 MILLIGRAM(S): 20 TABLET, DELAYED RELEASE ORAL at 06:00

## 2023-12-13 RX ADMIN — AMLODIPINE BESYLATE 5 MILLIGRAM(S): 2.5 TABLET ORAL at 05:54

## 2023-12-13 NOTE — DISCHARGE NOTE PROVIDER - NSFOLLOWUPCLINICS_GEN_ALL_ED_FT
[facility] General Internal Medicine  General Internal Medicine  [address] [id number]  Phone: [phone]  Fax:   Follow Up Time: 1 week     [facility] General Internal Medicine  General Internal Medicine  [address] [id number]  Phone: [phone]  Fax:   Follow Up Time: 1 week

## 2023-12-13 NOTE — CONSULT NOTE ADULT - SUBJECTIVE AND OBJECTIVE BOX
HEMATOLOGY ONCOLOGY CONSULT     Patient is a 73y old  Male who presents with a chief complaint of MICHAELA, elevated LFTs (13 Dec 2023 07:38)      HPI:  73-year-old male with a history of hypertension hyperlipidemia diabetes EtOH abuse dementia presenting with generalized weakness subjective fevers and decreased PO intake since he returned for the [place] on December 1st, Patient went to a hospital in the [place] for 1 episode of nausea and vomiting. Patient was subsequently released from the hospital and upon returning to the [facility] he went to [facility] for weakness and a fall. At that hospital he had a negative traumatic workup and was found to have acute kidney injury, He received IV fluids and his kidney function resolved.  Patient had renal ultrasounds which were unremarkable. Patient was diagnosed with transaminitis and pancytopenia likely secondary to alcohol. Other than weakness, patient denies any fever, chills, chest pain, shortness of breath, nausea, vomiting, abdominal pain, constipation, or diarrhea     On arrival, Temp 99.7, HR 86, /66, RR 16, SpO2 97% on room air. Labs remarkable for WBC 1.61, plt 49, PTT 39.8, , mg 1.3, BUN 44, creatinine 3.5, glucose 137, , . Chest xray showed clear lungs. In the ED, patient was given 1x dose of doxycycline and 1L of IVF.    (11 Dec 2023 06:08)       ROS:  Negative except for:    PAST MEDICAL & SURGICAL HISTORY:  HTN (hypertension)      HLD (hyperlipidemia)      Diabetes      H/O ETOH abuse          SOCIAL HISTORY: EtOH use, [place] from [place]    FAMILY HISTORY:      MEDICATIONS  (STANDING):  amLODIPine   Tablet 5 milliGRAM(s) Oral daily  atorvastatin 10 milliGRAM(s) Oral at bedtime  dextrose 5%. 1000 milliLiter(s) (100 mL/Hr) IV Continuous <Continuous>  dextrose 5%. 1000 milliLiter(s) (50 mL/Hr) IV Continuous <Continuous>  dextrose 50% Injectable 25 Gram(s) IV Push once  dextrose 50% Injectable 25 Gram(s) IV Push once  dextrose 50% Injectable 12.5 Gram(s) IV Push once  donepezil 5 milliGRAM(s) Oral at bedtime  doxycycline IVPB 100 milliGRAM(s) IV Intermittent every 12 hours  finasteride 5 milliGRAM(s) Oral daily  folic acid 1 milliGRAM(s) Oral daily  glucagon  Injectable 1 milliGRAM(s) IntraMuscular once  insulin lispro (ADMELOG) corrective regimen sliding scale   SubCutaneous three times a day before meals  lactated ringers. 1000 milliLiter(s) (75 mL/Hr) IV Continuous <Continuous>  pantoprazole    Tablet 40 milliGRAM(s) Oral before breakfast  tamsulosin 0.4 milliGRAM(s) Oral at bedtime  thiamine 100 milliGRAM(s) Oral daily    MEDICATIONS  (PRN):  acetaminophen     Tablet .. 650 milliGRAM(s) Oral every 6 hours PRN Temp greater or equal to 38C (100.4F)  dextrose Oral Gel 15 Gram(s) Oral once PRN Blood Glucose LESS THAN 70 milliGRAM(s)/deciliter      Allergies    No Known Allergies    Intolerances        Vital Signs Last 24 Hrs  T(C): 36.7 (13 Dec 2023 05:43), Max: 37.3 (12 Dec 2023 20:25)  T(F): 98 (13 Dec 2023 05:43), Max: 99.2 (12 Dec 2023 20:25)  HR: 67 (13 Dec 2023 05:43) (63 - 76)  BP: 113/73 (13 Dec 2023 05:43) (113/73 - 170/84)  BP(mean): --  RR: 18 (13 Dec 2023 05:43) (18 - 18)  SpO2: 99% (13 Dec 2023 05:43) (97% - 99%)    Parameters below as of 13 Dec 2023 05:43  Patient On (Oxygen Delivery Method): room air        PHYSICAL EXAM  General: adult in NAD  HEENT: clear oropharynx, anicteric sclera, pink conjunctiva  Neck: supple  CV: normal S1/S2 with no murmur rubs or gallops  Lungs: positive air movement b/l ant lungs,clear to auscultation, no wheezes, no rales  Abdomen: soft non-tender non-distended, no hepatosplenomegaly  Ext: no clubbing cyanosis or edema  Skin:  Neuro: alert and oriented X 4, no focal deficits      12-12-23 @ 07:01  -  12-13-23 @ 07:00  --------------------------------------------------------  IN: 240 mL / OUT: 500 mL / NET: -260 mL      LABS:                          13.1   2.97  )-----------( 34       ( 13 Dec 2023 07:30 )             38.1         Mean Cell Volume : 87.0 fl  Mean Cell Hemoglobin : 29.9 pg  Mean Cell Hemoglobin Concentration : 34.4 gm/dL  Auto Neutrophil # : x  Auto Lymphocyte # : x  Auto Monocyte # : x  Auto Eosinophil # : x  Auto Basophil # : x  Auto Neutrophil % : x  Auto Lymphocyte % : x  Auto Monocyte % : x  Auto Eosinophil % : x  Auto Basophil % : x      12-13    142  |  105  |  22  ----------------------------<  101<H>  3.7   |  25  |  1.65<H>    Ca    8.7      13 Dec 2023 07:26  Phos  2.8     12-13  Mg     1.3     12-13    TPro  6.5  /  Alb  3.5  /  TBili  0.4  /  DBili  x   /  AST  173<H>  /  ALT  128<H>  /  AlkPhos  37<L>  12-13      PT/INR - ( 12 Dec 2023 13:22 )   PT: 11.2 sec;   INR: 1.07 ratio         PTT - ( 12 Dec 2023 13:22 )  PTT:39.0 sec    Reticulocyte Percent: 0.4 % (12-11 @ 07:02)              BLOOD SMEAR INTERPRETATION:       RADIOLOGY & ADDITIONAL STUDIES:    CT head non con 12/11:  Impression: Encephalomalacia/gliosis of the right frontal lobe, likely   sequela of previous parenchymal hemorrhage.  No acute intracranial abnormality.    Abd u/s 12/11:  IMPRESSION:  Bilaterally increased renal echogenicity, suggestive of chronic medical   renal disease.    Suggestion of diffuse hepatic steatosis.    CXR 12/10:  IMPRESSION:  Clear lungs.           HEMATOLOGY ONCOLOGY CONSULT     Patient is a 73y old  Male who presents with a chief complaint of MICHAELA, elevated LFTs (13 Dec 2023 07:38)      HPI:  73-year-old male with a history of hypertension hyperlipidemia diabetes EtOH abuse dementia presenting with generalized weakness subjective fevers and decreased PO intake since he returned for the [place] on December 1st, Patient went to a hospital in the [place] for 1 episode of nausea and vomiting. Patient was subsequently released from the hospital and upon returning to the [facility] he went to [facility] for weakness and a fall. At that hospital he had a negative traumatic workup and was found to have acute kidney injury, He received IV fluids and his kidney function resolved.  Patient had renal ultrasounds which were unremarkable. Patient was diagnosed with transaminitis and pancytopenia likely secondary to alcohol. Other than weakness, patient denies any fever, chills, chest pain, shortness of breath, nausea, vomiting, abdominal pain, constipation, or diarrhea     On arrival, Temp 99.7, HR 86, /66, RR 16, SpO2 97% on room air. Labs remarkable for WBC 1.61, plt 49, PTT 39.8, , mg 1.3, BUN 44, creatinine 3.5, glucose 137, , . Chest xray showed clear lungs. In the ED, patient was given 1x dose of doxycycline and 1L of IVF.    (11 Dec 2023 06:08)       ROS:  Negative except for:    PAST MEDICAL & SURGICAL HISTORY:  HTN (hypertension)      HLD (hyperlipidemia)      Diabetes      H/O ETOH abuse          SOCIAL HISTORY: EtOH use, returned from [place] 12[place]/23    FAMILY HISTORY:      MEDICATIONS  (STANDING):  amLODIPine   Tablet 5 milliGRAM(s) Oral daily  atorvastatin 10 milliGRAM(s) Oral at bedtime  dextrose 5%. 1000 milliLiter(s) (100 mL/Hr) IV Continuous <Continuous>  dextrose 5%. 1000 milliLiter(s) (50 mL/Hr) IV Continuous <Continuous>  dextrose 50% Injectable 25 Gram(s) IV Push once  dextrose 50% Injectable 25 Gram(s) IV Push once  dextrose 50% Injectable 12.5 Gram(s) IV Push once  donepezil 5 milliGRAM(s) Oral at bedtime  doxycycline IVPB 100 milliGRAM(s) IV Intermittent every 12 hours  finasteride 5 milliGRAM(s) Oral daily  folic acid 1 milliGRAM(s) Oral daily  glucagon  Injectable 1 milliGRAM(s) IntraMuscular once  insulin lispro (ADMELOG) corrective regimen sliding scale   SubCutaneous three times a day before meals  lactated ringers. 1000 milliLiter(s) (75 mL/Hr) IV Continuous <Continuous>  pantoprazole    Tablet 40 milliGRAM(s) Oral before breakfast  tamsulosin 0.4 milliGRAM(s) Oral at bedtime  thiamine 100 milliGRAM(s) Oral daily    MEDICATIONS  (PRN):  acetaminophen     Tablet .. 650 milliGRAM(s) Oral every 6 hours PRN Temp greater or equal to 38C (100.4F)  dextrose Oral Gel 15 Gram(s) Oral once PRN Blood Glucose LESS THAN 70 milliGRAM(s)/deciliter      Allergies    No Known Allergies    Intolerances        Vital Signs Last 24 Hrs  T(C): 36.7 (13 Dec 2023 05:43), Max: 37.3 (12 Dec 2023 20:25)  T(F): 98 (13 Dec 2023 05:43), Max: 99.2 (12 Dec 2023 20:25)  HR: 67 (13 Dec 2023 05:43) (63 - 76)  BP: 113/73 (13 Dec 2023 05:43) (113/73 - 170/84)  BP(mean): --  RR: 18 (13 Dec 2023 05:43) (18 - 18)  SpO2: 99% (13 Dec 2023 05:43) (97% - 99%)    Parameters below as of 13 Dec 2023 05:43  Patient On (Oxygen Delivery Method): room air        PHYSICAL EXAM  General: adult in NAD  HEENT: clear oropharynx, anicteric sclera, pink conjunctiva  Neck: supple  CV: normal S1/S2 with no murmur rubs or gallops  Lungs: positive air movement b/l ant lungs,clear to auscultation, no wheezes, no rales  Abdomen: soft non-tender non-distended, no hepatosplenomegaly  Ext: no clubbing cyanosis or edema  Skin:  Neuro: alert and oriented X 4, no focal deficits      12-12-23 @ 07:01  -  12-13-23 @ 07:00  --------------------------------------------------------  IN: 240 mL / OUT: 500 mL / NET: -260 mL      LABS:                          13.1   2.97  )-----------( 34       ( 13 Dec 2023 07:30 )             38.1         Mean Cell Volume : 87.0 fl  Mean Cell Hemoglobin : 29.9 pg  Mean Cell Hemoglobin Concentration : 34.4 gm/dL  Auto Neutrophil # : x  Auto Lymphocyte # : x  Auto Monocyte # : x  Auto Eosinophil # : x  Auto Basophil # : x  Auto Neutrophil % : x  Auto Lymphocyte % : x  Auto Monocyte % : x  Auto Eosinophil % : x  Auto Basophil % : x      12-13    142  |  105  |  22  ----------------------------<  101<H>  3.7   |  25  |  1.65<H>    Ca    8.7      13 Dec 2023 07:26  Phos  2.8     12-13  Mg     1.3     12-13    TPro  6.5  /  Alb  3.5  /  TBili  0.4  /  DBili  x   /  AST  173<H>  /  ALT  128<H>  /  AlkPhos  37<L>  12-13      PT/INR - ( 12 Dec 2023 13:22 )   PT: 11.2 sec;   INR: 1.07 ratio         PTT - ( 12 Dec 2023 13:22 )  PTT:39.0 sec    Reticulocyte Percent: 0.4 % (12-11 @ 07:02)              BLOOD SMEAR INTERPRETATION:       RADIOLOGY & ADDITIONAL STUDIES:    CT head non con 12/11:  Impression: Encephalomalacia/gliosis of the right frontal lobe, likely   sequela of previous parenchymal hemorrhage.  No acute intracranial abnormality.    Abd u/s 12/11:  IMPRESSION:  Bilaterally increased renal echogenicity, suggestive of chronic medical   renal disease.    Suggestion of diffuse hepatic steatosis.    CXR 12/10:  IMPRESSION:  Clear lungs.           HEMATOLOGY ONCOLOGY CONSULT     Patient is a 73y old  Male who presents with a chief complaint of MICHAELA, elevated LFTs (13 Dec 2023 07:38)      HPI:  73-year-old male with a history of hypertension hyperlipidemia diabetes EtOH abuse dementia presenting with generalized weakness subjective fevers and decreased PO intake since he returned for the [place] on December 1st, Patient went to a hospital in the [place] for 1 episode of nausea and vomiting. Patient was subsequently released from the hospital and upon returning to the [facility] he went to [facility] for weakness and a fall. At that hospital he had a negative traumatic workup and was found to have acute kidney injury, He received IV fluids and his kidney function resolved.  Patient had renal ultrasounds which were unremarkable. Patient was diagnosed with transaminitis and pancytopenia likely secondary to alcohol. Other than weakness, patient denies any fever, chills, chest pain, shortness of breath, nausea, vomiting, abdominal pain, constipation, or diarrhea     On arrival, Temp 99.7, HR 86, /66, RR 16, SpO2 97% on room air. Labs remarkable for WBC 1.61, plt 49, PTT 39.8, , mg 1.3, BUN 44, creatinine 3.5, glucose 137, , . Chest xray showed clear lungs. In the ED, patient was given 1x dose of doxycycline and 1L of IVF.    (11 Dec 2023 06:08)       ROS:  Negative     PAST MEDICAL & SURGICAL HISTORY:  HTN (hypertension)      HLD (hyperlipidemia)      Diabetes      H/O ETOH abuse          SOCIAL HISTORY: EtOH use, returned from [place] on 12[place]1/23    FAMILY HISTORY: non contributory      MEDICATIONS  (STANDING):  amLODIPine   Tablet 5 milliGRAM(s) Oral daily  atorvastatin 10 milliGRAM(s) Oral at bedtime  dextrose 5%. 1000 milliLiter(s) (100 mL/Hr) IV Continuous <Continuous>  dextrose 5%. 1000 milliLiter(s) (50 mL/Hr) IV Continuous <Continuous>  dextrose 50% Injectable 25 Gram(s) IV Push once  dextrose 50% Injectable 25 Gram(s) IV Push once  dextrose 50% Injectable 12.5 Gram(s) IV Push once  donepezil 5 milliGRAM(s) Oral at bedtime  doxycycline IVPB 100 milliGRAM(s) IV Intermittent every 12 hours  finasteride 5 milliGRAM(s) Oral daily  folic acid 1 milliGRAM(s) Oral daily  glucagon  Injectable 1 milliGRAM(s) IntraMuscular once  insulin lispro (ADMELOG) corrective regimen sliding scale   SubCutaneous three times a day before meals  lactated ringers. 1000 milliLiter(s) (75 mL/Hr) IV Continuous <Continuous>  pantoprazole    Tablet 40 milliGRAM(s) Oral before breakfast  tamsulosin 0.4 milliGRAM(s) Oral at bedtime  thiamine 100 milliGRAM(s) Oral daily    MEDICATIONS  (PRN):  acetaminophen     Tablet .. 650 milliGRAM(s) Oral every 6 hours PRN Temp greater or equal to 38C (100.4F)  dextrose Oral Gel 15 Gram(s) Oral once PRN Blood Glucose LESS THAN 70 milliGRAM(s)/deciliter      Allergies    No Known Allergies    Intolerances        Vital Signs Last 24 Hrs  T(C): 36.7 (13 Dec 2023 05:43), Max: 37.3 (12 Dec 2023 20:25)  T(F): 98 (13 Dec 2023 05:43), Max: 99.2 (12 Dec 2023 20:25)  HR: 67 (13 Dec 2023 05:43) (63 - 76)  BP: 113/73 (13 Dec 2023 05:43) (113/73 - 170/84)  BP(mean): --  RR: 18 (13 Dec 2023 05:43) (18 - 18)  SpO2: 99% (13 Dec 2023 05:43) (97% - 99%)    Parameters below as of 13 Dec 2023 05:43  Patient On (Oxygen Delivery Method): room air        PHYSICAL EXAM  General: adult in NAD  HEENT: clear oropharynx, anicteric sclera, pink conjunctiva  CV: normal S1/S2 with no murmur rubs or gallops  Lungs: positive air movement b/l ant lungs,clear to auscultation, no wheezes, no rales  Abdomen: soft non-tender non-distended, normoactive bowel sounds  Ext: no clubbing cyanosis or edema.  Skin:  1-2cm purple/black macule noted on R posterior calf, but no erythema or other rash, multiple black macules noted on patient's back  Neuro: alert and oriented X 4, no focal deficits      12-12-23 @ 07:01  -  12-13-23 @ 07:00  --------------------------------------------------------  IN: 240 mL / OUT: 500 mL / NET: -260 mL      LABS:                          13.1   2.97  )-----------( 34       ( 13 Dec 2023 07:30 )             38.1         Mean Cell Volume : 87.0 fl  Mean Cell Hemoglobin : 29.9 pg  Mean Cell Hemoglobin Concentration : 34.4 gm/dL  Auto Neutrophil # : x  Auto Lymphocyte # : x  Auto Monocyte # : x  Auto Eosinophil # : x  Auto Basophil # : x  Auto Neutrophil % : x  Auto Lymphocyte % : x  Auto Monocyte % : x  Auto Eosinophil % : x  Auto Basophil % : x      12-13    142  |  105  |  22  ----------------------------<  101<H>  3.7   |  25  |  1.65<H>    Ca    8.7      13 Dec 2023 07:26  Phos  2.8     12-13  Mg     1.3     12-13    TPro  6.5  /  Alb  3.5  /  TBili  0.4  /  DBili  x   /  AST  173<H>  /  ALT  128<H>  /  AlkPhos  37<L>  12-13      PT/INR - ( 12 Dec 2023 13:22 )   PT: 11.2 sec;   INR: 1.07 ratio         PTT - ( 12 Dec 2023 13:22 )  PTT:39.0 sec    Reticulocyte Percent: 0.4 % (12-11 @ 07:02)              BLOOD SMEAR INTERPRETATION:   unremarkable    RADIOLOGY & ADDITIONAL STUDIES:    CT head non con 12/11:  Impression: Encephalomalacia/gliosis of the right frontal lobe, likely   sequela of previous parenchymal hemorrhage.  No acute intracranial abnormality.    Abd u/s 12/11:  IMPRESSION:  Bilaterally increased renal echogenicity, suggestive of chronic medical   renal disease.    Suggestion of diffuse hepatic steatosis.    CXR 12/10:  IMPRESSION:  Clear lungs.           HEMATOLOGY ONCOLOGY CONSULT     Patient is a 73y old  Male who presents with a chief complaint of MICHAELA, elevated LFTs (13 Dec 2023 07:38)      HPI:  73-year-old male with a history of hypertension hyperlipidemia diabetes EtOH abuse dementia presenting with generalized weakness subjective fevers and decreased PO intake since he returned for the [place] on December 1st, Patient went to a hospital in the [place] for 1 episode of nausea and vomiting. Patient was subsequently released from the hospital and upon returning to the [facility] he went to [facility] for weakness and a fall. At that hospital he had a negative traumatic workup and was found to have acute kidney injury, He received IV fluids and his kidney function resolved.  Patient had renal ultrasounds which were unremarkable. Patient was diagnosed with transaminitis and pancytopenia likely secondary to alcohol. Other than weakness, patient denies any fever, chills, chest pain, shortness of breath, nausea, vomiting, abdominal pain, constipation, or diarrhea     On arrival, Temp 99.7, HR 86, /66, RR 16, SpO2 97% on room air. Labs remarkable for WBC 1.61, plt 49, PTT 39.8, , mg 1.3, BUN 44, creatinine 3.5, glucose 137, , . Chest xray showed clear lungs. In the ED, patient was given 1x dose of doxycycline and 1L of IVF.    (11 Dec 2023 06:08)       ROS:  Negative     PAST MEDICAL & SURGICAL HISTORY:  HTN (hypertension)      HLD (hyperlipidemia)      Diabetes      H/O ETOH abuse          SOCIAL HISTORY: EtOH use, returned from [place] on 12[place]1/23    FAMILY HISTORY: non contributory      MEDICATIONS  (STANDING):  amLODIPine   Tablet 5 milliGRAM(s) Oral daily  atorvastatin 10 milliGRAM(s) Oral at bedtime  dextrose 5%. 1000 milliLiter(s) (100 mL/Hr) IV Continuous <Continuous>  dextrose 5%. 1000 milliLiter(s) (50 mL/Hr) IV Continuous <Continuous>  dextrose 50% Injectable 25 Gram(s) IV Push once  dextrose 50% Injectable 25 Gram(s) IV Push once  dextrose 50% Injectable 12.5 Gram(s) IV Push once  donepezil 5 milliGRAM(s) Oral at bedtime  doxycycline IVPB 100 milliGRAM(s) IV Intermittent every 12 hours  finasteride 5 milliGRAM(s) Oral daily  folic acid 1 milliGRAM(s) Oral daily  glucagon  Injectable 1 milliGRAM(s) IntraMuscular once  insulin lispro (ADMELOG) corrective regimen sliding scale   SubCutaneous three times a day before meals  lactated ringers. 1000 milliLiter(s) (75 mL/Hr) IV Continuous <Continuous>  pantoprazole    Tablet 40 milliGRAM(s) Oral before breakfast  tamsulosin 0.4 milliGRAM(s) Oral at bedtime  thiamine 100 milliGRAM(s) Oral daily    MEDICATIONS  (PRN):  acetaminophen     Tablet .. 650 milliGRAM(s) Oral every 6 hours PRN Temp greater or equal to 38C (100.4F)  dextrose Oral Gel 15 Gram(s) Oral once PRN Blood Glucose LESS THAN 70 milliGRAM(s)/deciliter      Allergies    No Known Allergies    Intolerances        Vital Signs Last 24 Hrs  T(C): 36.7 (13 Dec 2023 05:43), Max: 37.3 (12 Dec 2023 20:25)  T(F): 98 (13 Dec 2023 05:43), Max: 99.2 (12 Dec 2023 20:25)  HR: 67 (13 Dec 2023 05:43) (63 - 76)  BP: 113/73 (13 Dec 2023 05:43) (113/73 - 170/84)  BP(mean): --  RR: 18 (13 Dec 2023 05:43) (18 - 18)  SpO2: 99% (13 Dec 2023 05:43) (97% - 99%)    Parameters below as of 13 Dec 2023 05:43  Patient On (Oxygen Delivery Method): room air        PHYSICAL EXAM  General: adult in NAD  HEENT: clear oropharynx, anicteric sclera, pink conjunctiva  CV: normal S1/S2 with no murmur rubs or gallops  Lungs: positive air movement b/l ant lungs,clear to auscultation, no wheezes, no rales  Abdomen: soft non-tender non-distended, normoactive bowel sounds  Ext: no clubbing cyanosis or edema.  Skin:  1-2cm purple/black macule noted on R posterior calf, but no erythema or other rash, multiple black macules noted on patient's back  Neuro: alert and oriented X 4, no focal deficits      12-12-23 @ 07:01  -  12-13-23 @ 07:00  --------------------------------------------------------  IN: 240 mL / OUT: 500 mL / NET: -260 mL      LABS:                          13.1   2.97  )-----------( 34       ( 13 Dec 2023 07:30 )             38.1         Mean Cell Volume : 87.0 fl  Mean Cell Hemoglobin : 29.9 pg  Mean Cell Hemoglobin Concentration : 34.4 gm/dL  Auto Neutrophil # : x  Auto Lymphocyte # : x  Auto Monocyte # : x  Auto Eosinophil # : x  Auto Basophil # : x  Auto Neutrophil % : x  Auto Lymphocyte % : x  Auto Monocyte % : x  Auto Eosinophil % : x  Auto Basophil % : x      12-13    142  |  105  |  22  ----------------------------<  101<H>  3.7   |  25  |  1.65<H>    Ca    8.7      13 Dec 2023 07:26  Phos  2.8     12-13  Mg     1.3     12-13    TPro  6.5  /  Alb  3.5  /  TBili  0.4  /  DBili  x   /  AST  173<H>  /  ALT  128<H>  /  AlkPhos  37<L>  12-13      PT/INR - ( 12 Dec 2023 13:22 )   PT: 11.2 sec;   INR: 1.07 ratio         PTT - ( 12 Dec 2023 13:22 )  PTT:39.0 sec    Reticulocyte Percent: 0.4 % (12-11 @ 07:02)              BLOOD SMEAR INTERPRETATION:   unremarkable    RADIOLOGY & ADDITIONAL STUDIES:    CT head non con 12/11:  Impression: Encephalomalacia/gliosis of the right frontal lobe, likely   sequela of previous parenchymal hemorrhage.  No acute intracranial abnormality.    Abd u/s 12/11:  IMPRESSION:  Bilaterally increased renal echogenicity, suggestive of chronic medical   renal disease.    Suggestion of diffuse hepatic steatosis.    CXR 12/10:  IMPRESSION:  Clear lungs.           HEMATOLOGY ONCOLOGY CONSULT     Patient is a 73y old  Male who presents with a chief complaint of MICHAELA, elevated LFTs (13 Dec 2023 07:38)    Translation provided by Nigerien-speaking attending.    HPI:  73-year-old [place]ien-speaking male with a history of hypertension, hyperlipidemia, diabetes, EtOH abuse, and dementia presenting with generalized weakness, subjective fevers, and decreased PO intake since he returned for the [place] on December 1st. Patient went to a hospital in the [place] for 1 episode of nausea and vomiting. Patient was subsequently released from the hospital and upon returning to the [facility] he went to [facility] for weakness and a fall. At that hospital he had a negative traumatic workup and was found to have acute kidney injury. He received IV fluids and his kidney function resolved. Patient had renal ultrasounds which were unremarkable. Patient was diagnosed with transaminitis and pancytopenia likely secondary to alcohol. Other than weakness, patient denies any fever, chills, chest pain, shortness of breath, nausea, vomiting, abdominal pain, constipation, or diarrhea     On arrival, Temp 99.7, HR 86, /66, RR 16, SpO2 97% on room air. Labs remarkable for WBC 1.61, plt 49, PTT 39.8, , mg 1.3, BUN 44, creatinine 3.5, glucose 137, , . Chest xray showed clear lungs. In the ED, patient was given 1x dose of doxycycline and 1L of IVF.    (11 Dec 2023 06:08)       ROS:  Negative except for as above    PAST MEDICAL & SURGICAL HISTORY:  HTN (hypertension)      HLD (hyperlipidemia)      Diabetes      H/O ETOH abuse          SOCIAL HISTORY: EtOH use, returned from [place] on 12[place]1/23    FAMILY HISTORY: non contributory      MEDICATIONS  (STANDING):  amLODIPine   Tablet 5 milliGRAM(s) Oral daily  atorvastatin 10 milliGRAM(s) Oral at bedtime  dextrose 5%. 1000 milliLiter(s) (100 mL/Hr) IV Continuous <Continuous>  dextrose 5%. 1000 milliLiter(s) (50 mL/Hr) IV Continuous <Continuous>  dextrose 50% Injectable 25 Gram(s) IV Push once  dextrose 50% Injectable 25 Gram(s) IV Push once  dextrose 50% Injectable 12.5 Gram(s) IV Push once  donepezil 5 milliGRAM(s) Oral at bedtime  doxycycline IVPB 100 milliGRAM(s) IV Intermittent every 12 hours  finasteride 5 milliGRAM(s) Oral daily  folic acid 1 milliGRAM(s) Oral daily  glucagon  Injectable 1 milliGRAM(s) IntraMuscular once  insulin lispro (ADMELOG) corrective regimen sliding scale   SubCutaneous three times a day before meals  lactated ringers. 1000 milliLiter(s) (75 mL/Hr) IV Continuous <Continuous>  pantoprazole    Tablet 40 milliGRAM(s) Oral before breakfast  tamsulosin 0.4 milliGRAM(s) Oral at bedtime  thiamine 100 milliGRAM(s) Oral daily    MEDICATIONS  (PRN):  acetaminophen     Tablet .. 650 milliGRAM(s) Oral every 6 hours PRN Temp greater or equal to 38C (100.4F)  dextrose Oral Gel 15 Gram(s) Oral once PRN Blood Glucose LESS THAN 70 milliGRAM(s)/deciliter      Allergies    No Known Allergies    Intolerances        Vital Signs Last 24 Hrs  T(C): 36.7 (13 Dec 2023 05:43), Max: 37.3 (12 Dec 2023 20:25)  T(F): 98 (13 Dec 2023 05:43), Max: 99.2 (12 Dec 2023 20:25)  HR: 67 (13 Dec 2023 05:43) (63 - 76)  BP: 113/73 (13 Dec 2023 05:43) (113/73 - 170/84)  BP(mean): --  RR: 18 (13 Dec 2023 05:43) (18 - 18)  SpO2: 99% (13 Dec 2023 05:43) (97% - 99%)    Parameters below as of 13 Dec 2023 05:43  Patient On (Oxygen Delivery Method): room air        PHYSICAL EXAM  General: adult in NAD  HEENT: clear oropharynx, anicteric sclera, pink conjunctiva  CV: normal S1/S2 with no murmur rubs or gallops  Lungs: positive air movement b/l ant lungs, clear to auscultation, no wheezes, no rales  Abdomen: soft non-tender non-distended, normoactive bowel sounds  Ext: no clubbing cyanosis or edema.  Skin:  1-2cm purple/black macule noted on R posterior calf, but no erythema or other rash, multiple black macules noted on patient's back  Neuro: alert and oriented X 4, no focal deficits      12-12-23 @ 07:01  -  12-13-23 @ 07:00  --------------------------------------------------------  IN: 240 mL / OUT: 500 mL / NET: -260 mL      LABS:                          13.1   2.97  )-----------( 34       ( 13 Dec 2023 07:30 )             38.1         Mean Cell Volume : 87.0 fl  Mean Cell Hemoglobin : 29.9 pg  Mean Cell Hemoglobin Concentration : 34.4 gm/dL  Auto Neutrophil # : x  Auto Lymphocyte # : x  Auto Monocyte # : x  Auto Eosinophil # : x  Auto Basophil # : x  Auto Neutrophil % : x  Auto Lymphocyte % : x  Auto Monocyte % : x  Auto Eosinophil % : x  Auto Basophil % : x      12-13    142  |  105  |  22  ----------------------------<  101<H>  3.7   |  25  |  1.65<H>    Ca    8.7      13 Dec 2023 07:26  Phos  2.8     12-13  Mg     1.3     12-13    TPro  6.5  /  Alb  3.5  /  TBili  0.4  /  DBili  x   /  AST  173<H>  /  ALT  128<H>  /  AlkPhos  37<L>  12-13      PT/INR - ( 12 Dec 2023 13:22 )   PT: 11.2 sec;   INR: 1.07 ratio         PTT - ( 12 Dec 2023 13:22 )  PTT:39.0 sec    Reticulocyte Percent: 0.4 % (12-11 @ 07:02)              BLOOD SMEAR INTERPRETATION:   unremarkable    RADIOLOGY & ADDITIONAL STUDIES:    CT head non con 12/11:  Impression: Encephalomalacia/gliosis of the right frontal lobe, likely   sequela of previous parenchymal hemorrhage.  No acute intracranial abnormality.    Abd u/s 12/11:  IMPRESSION:  Bilaterally increased renal echogenicity, suggestive of chronic medical   renal disease.    Suggestion of diffuse hepatic steatosis.    CXR 12/10:  IMPRESSION:  Clear lungs.           HEMATOLOGY ONCOLOGY CONSULT     Patient is a 73y old  Male who presents with a chief complaint of MICHAELA, elevated LFTs (13 Dec 2023 07:38)    Translation provided by Tongan-speaking attending.    HPI:  73-year-old Tongan-speaking male with a history of hypertension, hyperlipidemia, diabetes, EtOH abuse, and dementia presenting with generalized weakness, subjective fevers, and decreased PO intake since he returned for the [place] on December 1st. Patient went to a hospital in the [place] for 1 episode of nausea and vomiting. Patient was subsequently released from the hospital and upon returning to the [facility] he went to [facility] for weakness and a fall. At that hospital he had a negative traumatic workup and was found to have acute kidney injury. He received IV fluids and his kidney function resolved. Patient had renal ultrasounds which were unremarkable. Patient was diagnosed with transaminitis and pancytopenia likely secondary to alcohol. Other than weakness, patient denies any fever, chills, chest pain, shortness of breath, nausea, vomiting, abdominal pain, constipation, or diarrhea     On arrival, Temp 99.7, HR 86, /66, RR 16, SpO2 97% on room air. Labs remarkable for WBC 1.61, plt 49, PTT 39.8, , mg 1.3, BUN 44, creatinine 3.5, glucose 137, , . Chest xray showed clear lungs. In the ED, patient was given 1x dose of doxycycline and 1L of IVF.    (11 Dec 2023 06:08)       ROS:  Negative except for as above    PAST MEDICAL & SURGICAL HISTORY:  HTN (hypertension)      HLD (hyperlipidemia)      Diabetes      H/O ETOH abuse          SOCIAL HISTORY: EtOH use, returned from [place]    FAMILY HISTORY: non contributory      MEDICATIONS  (STANDING):  amLODIPine   Tablet 5 milliGRAM(s) Oral daily  atorvastatin 10 milliGRAM(s) Oral at bedtime  dextrose 5%. 1000 milliLiter(s) (100 mL/Hr) IV Continuous <Continuous>  dextrose 5%. 1000 milliLiter(s) (50 mL/Hr) IV Continuous <Continuous>  dextrose 50% Injectable 25 Gram(s) IV Push once  dextrose 50% Injectable 25 Gram(s) IV Push once  dextrose 50% Injectable 12.5 Gram(s) IV Push once  donepezil 5 milliGRAM(s) Oral at bedtime  doxycycline IVPB 100 milliGRAM(s) IV Intermittent every 12 hours  finasteride 5 milliGRAM(s) Oral daily  folic acid 1 milliGRAM(s) Oral daily  glucagon  Injectable 1 milliGRAM(s) IntraMuscular once  insulin lispro (ADMELOG) corrective regimen sliding scale   SubCutaneous three times a day before meals  lactated ringers. 1000 milliLiter(s) (75 mL/Hr) IV Continuous <Continuous>  pantoprazole    Tablet 40 milliGRAM(s) Oral before breakfast  tamsulosin 0.4 milliGRAM(s) Oral at bedtime  thiamine 100 milliGRAM(s) Oral daily    MEDICATIONS  (PRN):  acetaminophen     Tablet .. 650 milliGRAM(s) Oral every 6 hours PRN Temp greater or equal to 38C (100.4F)  dextrose Oral Gel 15 Gram(s) Oral once PRN Blood Glucose LESS THAN 70 milliGRAM(s)/deciliter      Allergies    No Known Allergies    Intolerances        Vital Signs Last 24 Hrs  T(C): 36.7 (13 Dec 2023 05:43), Max: 37.3 (12 Dec 2023 20:25)  T(F): 98 (13 Dec 2023 05:43), Max: 99.2 (12 Dec 2023 20:25)  HR: 67 (13 Dec 2023 05:43) (63 - 76)  BP: 113/73 (13 Dec 2023 05:43) (113/73 - 170/84)  BP(mean): --  RR: 18 (13 Dec 2023 05:43) (18 - 18)  SpO2: 99% (13 Dec 2023 05:43) (97% - 99%)    Parameters below as of 13 Dec 2023 05:43  Patient On (Oxygen Delivery Method): room air        PHYSICAL EXAM  General: adult in NAD  HEENT: clear oropharynx, anicteric sclera, pink conjunctiva  CV: normal S1/S2 with no murmur rubs or gallops  Lungs: positive air movement b/l ant lungs, clear to auscultation, no wheezes, no rales  Abdomen: soft non-tender non-distended, normoactive bowel sounds  Ext: no clubbing cyanosis or edema.  Skin:  1-2cm purple/black macule noted on R posterior calf, but no erythema or other rash, multiple black macules noted on patient's back  Neuro: alert and oriented X 4, no focal deficits      12-12-23 @ 07:01  -  12-13-23 @ 07:00  --------------------------------------------------------  IN: 240 mL / OUT: 500 mL / NET: -260 mL      LABS:                          13.1   2.97  )-----------( 34       ( 13 Dec 2023 07:30 )             38.1         Mean Cell Volume : 87.0 fl  Mean Cell Hemoglobin : 29.9 pg  Mean Cell Hemoglobin Concentration : 34.4 gm/dL  Auto Neutrophil # : x  Auto Lymphocyte # : x  Auto Monocyte # : x  Auto Eosinophil # : x  Auto Basophil # : x  Auto Neutrophil % : x  Auto Lymphocyte % : x  Auto Monocyte % : x  Auto Eosinophil % : x  Auto Basophil % : x      12-13    142  |  105  |  22  ----------------------------<  101<H>  3.7   |  25  |  1.65<H>    Ca    8.7      13 Dec 2023 07:26  Phos  2.8     12-13  Mg     1.3     12-13    TPro  6.5  /  Alb  3.5  /  TBili  0.4  /  DBili  x   /  AST  173<H>  /  ALT  128<H>  /  AlkPhos  37<L>  12-13      PT/INR - ( 12 Dec 2023 13:22 )   PT: 11.2 sec;   INR: 1.07 ratio         PTT - ( 12 Dec 2023 13:22 )  PTT:39.0 sec    Reticulocyte Percent: 0.4 % (12-11 @ 07:02)              BLOOD SMEAR INTERPRETATION:   unremarkable    RADIOLOGY & ADDITIONAL STUDIES:    CT head non con 12/11:  Impression: Encephalomalacia/gliosis of the right frontal lobe, likely   sequela of previous parenchymal hemorrhage.  No acute intracranial abnormality.    Abd u/s 12/11:  IMPRESSION:  Bilaterally increased renal echogenicity, suggestive of chronic medical   renal disease.    Suggestion of diffuse hepatic steatosis.    CXR 12/10:  IMPRESSION:  Clear lungs.

## 2023-12-13 NOTE — DISCHARGE NOTE PROVIDER - NSDCMRMEDTOKEN_GEN_ALL_CORE_FT
amLODIPine 5 mg oral tablet: 1 tab(s) orally once a day  Aricept 5 mg oral tablet: 1 tab(s) orally once a day  atorvastatin 10 mg oral tablet: 1 tab(s) orally once a day (at bedtime)  finasteride 5 mg oral tablet: 1 tab(s) orally once a day  folic acid 1 mg oral tablet: 1 tab(s) orally once a day  metFORMIN 500 mg oral tablet: 1 tab(s) orally 2 times a day  NexIUM 20 mg oral delayed release capsule: 1 cap(s) orally once a day  tamsulosin 0.4 mg oral capsule: 1 cap(s) orally once a day (at bedtime)  thiamine 100 mg oral tablet: 1 tab(s) orally once a day   amLODIPine 5 mg oral tablet: 1 tab(s) orally once a day  atorvastatin 10 mg oral tablet: 1 tab(s) orally once a day (at bedtime)  donepezil 5 mg oral tablet: 1 tab(s) orally once a day (at bedtime)  doxycycline monohydrate 50 mg oral capsule: 2 cap(s) orally every 12 hours Reyna 2 tabletas cada 12 horas para la infección. El último día para [facility] es el miércoles 27/12. MDD: 2  finasteride 5 mg oral tablet: 1 tab(s) orally once a day  folic acid 1 mg oral tablet: 1 tab(s) orally once a day  metFORMIN 500 mg oral tablet: 1 tab(s) orally 2 times a day  Multiple Vitamins oral tablet: 1 tab(s) orally once a day [place] 1 multivitamínico cada día. MDD: 1  NexIUM 20 mg oral delayed release capsule: 1 cap(s) orally once a day  tamsulosin 0.4 mg oral capsule: 1 cap(s) orally once a day (at bedtime)  thiamine 100 mg oral tablet: 1 tab(s) orally once a day   amLODIPine 5 mg oral tablet: 1 tab(s) orally once a day  atorvastatin 10 mg oral tablet: 1 tab(s) orally once a day (at bedtime)  donepezil 5 mg oral tablet: 1 tab(s) orally once a day (at bedtime)  doxycycline monohydrate 50 mg oral capsule: 2 cap(s) orally every 12 hours Reyna 2 tabletas cada 12 horas para la infección. El último día para [facility]hon medic[facility]os es el miércoles 27/12. MDD: 2  finasteride 5 mg oral tablet: 1 tab(s) orally once a day  folic acid 1 mg oral tablet: 1 tab(s) orally once a day  metFORMIN 500 mg oral tablet: 1 tab(s) orally 2 times a day  Multiple Vitamins oral tablet: 1 tab(s) orally once a day [place] 1 multivitamínico cada día. MDD: 1  NexIUM 20 mg oral delayed release capsule: 1 cap(s) orally once a day  tamsulosin 0.4 mg oral capsule: 1 cap(s) orally once a day (at bedtime)  thiamine 100 mg oral tablet: 1 tab(s) orally once a day   amLODIPine 5 mg oral tablet: 1 tab(s) orally once a day  atorvastatin 10 mg oral tablet: 1 tab(s) orally once a day (at bedtime)  donepezil 5 mg oral tablet: 1 tab(s) orally once a day (at bedtime)  doxycycline hyclate 50 mg oral tablet: 2 tab(s) orally 2 times a day Reyna 2 tabletas cada 12 horas para la infección. El último día para jhon medicamentos es el miércoles 27/12.  finasteride 5 mg oral tablet: 1 tab(s) orally once a day  folic acid 1 mg oral tablet: 1 tab(s) orally once a day  metFORMIN 500 mg oral tablet: 1 tab(s) orally 2 times a day  Multiple Vitamins oral tablet: 1 tab(s) orally once a day  NexIUM 20 mg oral delayed release capsule: 1 cap(s) orally once a day  tamsulosin 0.4 mg oral capsule: 1 cap(s) orally once a day (at bedtime)  thiamine 100 mg oral tablet: 1 tab(s) orally once a day

## 2023-12-13 NOTE — CONSULT NOTE ADULT - ATTENDING COMMENTS
73-year-old Mongolian-speaking male with a history of HTN, HLD, DM2, EtOH abuse, and dementia admitted for generalized weakness and poor PO intake over the last 2-3 weeks as well as MICHAELA, found to have leukopenia and thrombocytopenia, for which Hematology was consulted, along with central clearing rash on R leg, undergoing workup.     - Leukopenia bonny 1.61K on 12/10 and thrombocytopenia bonny 12/12 at 26, both now uptrending  - Peripheral smear showed numerous atypical lymphocytes and some monocytes, as well as some giant platelets  - Patient with heavy alcohol use along with hepatic steatosis seen on abd u/s and transaminitis with AST >ALT, so liver disease may partially cause of thrombocytopenia and leukopenia  - B12/folate normal  - However, patient does have RMSF IgM elevation as noted above. Suspect may be the primary culprit for cytopenias  - Also pending dengue serologies, quant gold, travel associated blood parasite lab panel  - F/u ID thoughts and recommendations, currently on doxycycline 73-year-old Moh[place]-speaking male with a history of HTN, HLD, DM2, EtOH abuse, and dementia admitted for generalized weakness and poor PO intake over the last 2-3 weeks as well as MICHAELA, found to have leukopenia and thrombocytopenia, for which Hematology was consulted, along with central clearing rash on R leg, undergoing workup.     - Leukopenia bonny 1.61K on 12/10 and thrombocytopenia bonny 12/12 at 26, both now uptrending  - Peripheral smear showed numerous atypical lymphocytes and some monocytes, as well as some giant platelets  - Patient with heavy alcohol use along with hepatic steatosis seen on abd u/s and transaminitis with AST >ALT, so liver disease may partially cause of thrombocytopenia and leukopenia  - B12/folate normal  - However, patient does have RMSF IgM elevation as noted above. Suspect may be the primary culprit for cytopenias  - Also pending dengue serologies, quant gold, travel associated blood parasite lab panel  - F/u ID thoughts and recommendations, currently on doxycycline

## 2023-12-13 NOTE — DIETITIAN INITIAL EVALUATION ADULT - NSFNSNUTRHOMESUPPLEMENTFT_GEN_A_CORE
Pt's son stated the pt did not take any micronutrient supplementation prior to admission. Pt's son stated the pt drank Ensure 1x/day at home prior to admission.

## 2023-12-13 NOTE — DIETITIAN INITIAL EVALUATION ADULT - REASON INDICATOR FOR ASSESSMENT
RD consult warranted for MST Score 2 or >  Source: Patient, Patient's Son at Bedside (Ever), Electronic Medical Record  Chart reviewed, events noted.

## 2023-12-13 NOTE — DISCHARGE NOTE PROVIDER - NSDCCPCAREPLAN_GEN_ALL_CORE_FT
PRINCIPAL DISCHARGE DIAGNOSIS  Diagnosis: RMSF ([place] spotted fever)  Assessment and Plan of Treatment: You were diagnosed with [place] Spotted Fever (RMSF). This is a bacterial infection that is transferred via tick bites. It can cause rash, fevers, and confusion. It is treated with antibiotics (doxycycline) for 14 days, which was started in the hospital.   Please finish the antibiotic course at home as prescribed.  Please follow up with your PCP to ensure continued improvement and management.      SECONDARY DISCHARGE DIAGNOSES  Diagnosis: Transaminitis  Assessment and Plan of Treatment:     Diagnosis: Leukopenia  Assessment and Plan of Treatment:     Diagnosis: Thrombocytopenia  Assessment and Plan of Treatment:     Diagnosis: Hypomagnesemia  Assessment and Plan of Treatment:     Diagnosis: MICHAELA (acute kidney injury)  Assessment and Plan of Treatment:      PRINCIPAL DISCHARGE DIAGNOSIS  Diagnosis: RMSF ([place] spotted fever)  Assessment and Plan of Treatment: You were diagnosed with [place] Spotted Fever (RMSF). This is a bacterial infection that is transferred via tick bites. It can cause rash, fevers, and confusion. It is treated with antibiotics (doxycycline) for 14 days, which was started in the hospital.   Please finish the antibiotic course at home as prescribed.  Please follow up with your PCP to ensure continued improvement and management.  If you note any fever, rash, bleeding, or acutely worsening mental status please come back to the ED.

## 2023-12-13 NOTE — DISCHARGE NOTE PROVIDER - CARE PROVIDER_API CALL
Corazon Noel  Internal Medicine  [address] [facility] [address]  [place] [address]  Phone: [phone]  Fax: [phone]  Follow Up Time: 2 weeks   Corazon Noel  Internal Medicine  [address]  [place] [address]  Phone: [phone]  Fax: [phone]  Follow Up Time: 2 weeks

## 2023-12-13 NOTE — DIETITIAN INITIAL EVALUATION ADULT - OTHER CALCULATIONS
Estimated protein-energy needs calculated using current dosing weight of 165.7 pounds (12/11) with consideration for malnutrition and weight loss.   Defer fluid calculations to the medical team.

## 2023-12-13 NOTE — DISCHARGE NOTE PROVIDER - CARE PROVIDERS DIRECT ADDRESSES
[email or web address]

## 2023-12-13 NOTE — PROGRESS NOTE ADULT - ATTENDING COMMENTS
73-year-old male with a history of hypertension hyperlipidemia, DM, EtOH use disorder, Dementia presented with generalized weakness , subjective fevers and decreased PO intake since he returned for the [place] on December 1st, Patient went to a hospital in the [place] for 1 episode of nausea and vomiting. Patient was subsequently released from the hospital and upon returning to the [place] he went to [facility] for weakness and a fall. At that hospital he had a negative traumatic workup and was found to have acute kidney injury, He received IV fluids and his kidney function resolved.  Patient had renal ultrasounds which were unremarkable. Patient was diagnosed with transaminitis and pancytopenia likely secondary to alcohol.  On arrival, Temp 99.7, HR 86, /66, RR 16, SpO2 97% on room air. Labs remarkable for WBC 1.61, plt 49, PTT 39.8, , mg 1.3, BUN 44, creatinine 3.5, glucose 137, , . Chest xray showed clear lungs. In the ED, patient was given 1x dose of doxycycline and 1L of IVF and admitted for further evaluation and treatment   # Fever, Bicytopenia/ SIRS in a traveler has a very broad Diff Diagnosis in the [place] with reported Dengue outbreak / Pt is hemodynamically stable     --> pt was started on DOxy was DC and restarted due to pos IGM for RMSF --> noted to be low IGM ) ? cross reactivity --> might need to repeat labs   of note patient patient has H/O ETOH use disorder which could be a confounder/ cont to closely monitor   - Will get Neuro and Hem evaluation   Transfuse Plt if <10K or <15K with fever and <50 K if bleeding   WIll eventually get PT eval once able  Hem rec is appreciated     Lexis Johnson   Hospitalist   available on TEAMS 73-year-old male with a history of hypertension hyperlipidemia, DM, EtOH use disorder, Dementia presented with generalized weakness , subjective fevers and decreased PO intake since he returned for the [place] on December 1st, Patient went to a hospital in the [place] for 1 episode of nausea and vomiting. Patient was subsequently released from the hospital and upon returning to the [place] he went to [facility] for weakness and a fall. At that hospital he had a negative traumatic workup and was found to have acute kidney injury, He received IV fluids and his kidney function resolved.  Patient had renal ultrasounds which were unremarkable. Patient was diagnosed with transaminitis and pancytopenia likely secondary to alcohol.  On arrival, Temp 99.7, HR 86, /66, RR 16, SpO2 97% on room air. Labs remarkable for WBC 1.61, plt 49, PTT 39.8, , mg 1.3, BUN 44, creatinine 3.5, glucose 137, , . Chest xray showed clear lungs. In the ED, patient was given 1x dose of doxycycline and 1L of IVF and admitted for further evaluation and treatment   # Fever, Bicytopenia/ SIRS in a traveler has a very broad Diff Diagnosis in the [place] with reported Dengue outbreak / Pt is hemodynamically stable     --> pt was started on DOxy was DC and restarted due to pos IGM for RMSF --> noted to be low IGM ) ? cross reactivity --> might need to repeat labs   of note patient patient has H/O ETOH use disorder which could be a confounder/ cont to closely monitor   - Will get Neuro and Hem evaluation   Transfuse Plt if <10K or <15K with fever and <50 K if bleeding   WIll eventually get PT eval once able  Hem rec is appreciated     Lexis Johnson   Hospitalist   available on TEAMS

## 2023-12-13 NOTE — DISCHARGE NOTE PROVIDER - DETAILS OF MALNUTRITION DIAGNOSIS/DIAGNOSES
This patient has been assessed with a concern for Malnutrition and was treated during this hospitalization for the following Nutrition diagnosis/diagnoses:     -  12/13/2023: Severe protein-calorie malnutrition

## 2023-12-13 NOTE — DIETITIAN INITIAL EVALUATION ADULT - NS FNS DIET ORDER
Diet, Consistent Carbohydrate w/Evening Snack:   DASH/TLC {Sodium & Cholesterol Restricted} (DASH) (12-11-23 @ 09:52) [Active]

## 2023-12-13 NOTE — DIETITIAN INITIAL EVALUATION ADULT - PERTINENT LABORATORY DATA
Walk in 12-13    142  |  105  |  22  ----------------------------<  101<H>  3.7   |  25  |  1.65<H>    Ca    8.7      13 Dec 2023 07:26  Phos  2.8     12-13  Mg     1.3     12-13    TPro  6.5  /  Alb  3.5  /  TBili  0.4  /  DBili  x   /  AST  173<H>  /  ALT  128<H>  /  AlkPhos  37<L>  12-13  POCT Blood Glucose.: 114 mg/dL (12-13-23 @ 12:59)  A1C with Estimated Average Glucose Result: 6.1 % (12-12-23 @ 06:53)

## 2023-12-13 NOTE — DIETITIAN INITIAL EVALUATION ADULT - ORAL INTAKE PTA/DIET HISTORY
Nutrition assessment completed at bedside. Pt's son (Ever) present at bedside for interview. Pt's son reported poor appetite and PO intake x ~10 days prior to admission. Per H&P, pt with "decreased PO intake since he returned for the [place] on December 1st." Pt's son endorsed possible weight loss based upon visual observation, endorsed that the pt looks thinner. Pt reported UBW of ~179 pounds (8 days ago?). Confirmed no known food allergies.  Nutrition assessment completed at bedside. Pt's son (Ever) present at bedside for interview. Pt's son reported poor appetite and PO intake x ~10 days prior to admission. Per H&P, pt with "decreased PO intake since he returned for the [place] on December 1st." Pt's son endorsed possible weight loss based upon visual observation, endorsed that the pt looks thinner. Pt reported UBW of ~179 pounds (8 days ago?). Confirmed no known food allergies.

## 2023-12-13 NOTE — CONSULT NOTE ADULT - ASSESSMENT
73-year-old male with a history of HTN, HLD, DM2, EtOH abuse, dementia admitted for generalized weakness and poor PO intake over the last 2-3 weeks, MICHAELA, found to have leukopenia and thrombocytopenia along with central clearing rash on R leg, undergoing workup.     #Leukopenia  #Thrombocytopenia  Leukopenia slowly uptrending, thrombocytopenia with bonny 12/12 at 25, uptrending. Differential includes infectious, drug/toxin induced, malignant causes. Under infectious, given recent travel to [place] as well as rash on leg, can consider tick borne illness (RMSF IgM+, IgG negative, lyme neg, vijay, plasma neg, ehrlichia neg, anaplasma neg, babesia neg) vs. viral (HIV negative, dengue serologies pending, CMV IgG pos, IgM neg, EBV IgG pos, IgM neg, malaria neg). Leptospirosis neg. Bcx currently NGTD. Blood smear unremarkable. Elevated immature granulocyte indicating bone marrow functioning. Patients labs in 2017 with no thrombocytopenia or leukopenia.   - ID on board as well  - favoring infectious cause given fever, transaminitis associated with lab abnormalities. negative pcr for ehrlichia/anaplasma may not rule out given ~1-2weeks since onset of symptoms, defer to ID  - clarify if IgM positivity for RMSF indicative of current infection and cause of symptoms with ID  - on doxycycline currently  - pending dengue serologies, quant gold, travel associated blood parasite lab panel      NOTE AND RECOMMENDATIONS NOT FINALIZED UNTIL SIGNED BY ATTENDING         73-year-old male with a history of HTN, HLD, DM2, EtOH abuse, dementia admitted for generalized weakness and poor PO intake over the last 2-3 weeks, MICHAELA, found to have leukopenia and thrombocytopenia along with central clearing rash on R leg, undergoing workup.     #Leukopenia  #Thrombocytopenia  Leukopenia slowly uptrending, thrombocytopenia with bonny 12/12 at 25, uptrending. Differential includes infectious, drug/toxin induced, malignant causes. Under infectious, given recent travel to [place] as well as rash on leg, can consider tick borne illness (RMSF IgM+, IgG negative, lyme neg, vijay, plasma neg, ehrlichia neg, anaplasma neg, babesia neg) vs. viral (HIV negative, dengue serologies pending, CMV IgG pos, IgM neg, EBV IgG pos, IgM neg, malaria neg). Leptospirosis neg. Bcx currently NGTD. Blood smear unremarkable. Elevated immature granulocyte indicating bone marrow functioning. Patients labs in 2017 with no thrombocytopenia or leukopenia.   - ID on board as well  - favoring infectious cause given fever, transaminitis associated with lab abnormalities. negative pcr for ehrlichia/anaplasma may not rule out given ~1-2weeks since onset of symptoms, defer to ID  - clarify if IgM positivity for RMSF indicative of current infection and cause of symptoms with ID  - on doxycycline currently  - pending dengue serologies, quant gold, travel associated blood parasite lab panel      NOTE AND RECOMMENDATIONS NOT FINALIZED UNTIL SIGNED BY ATTENDING         73-year-old male with a history of HTN, HLD, DM2, EtOH abuse, dementia admitted for generalized weakness and poor PO intake over the last 2-3 weeks, MICHAELA, found to have leukopenia and thrombocytopenia along with central clearing rash on R leg, undergoing workup.     #Leukopenia  #Thrombocytopenia  Leukopenia slowly uptrending, thrombocytopenia with bonny 12/12 at 25, uptrending. Differential includes infectious, drug/toxin induced, malignant causes. Under infectious, given recent travel to [place] as well as rash on leg, can consider tick borne illness (RMSF IgM+, IgG negative, lyme neg, vijay, plasma neg, ehrlichia neg, anaplasma neg, babesia neg) vs. viral (HIV negative, dengue serologies pending, CMV IgG pos, IgM neg, EBV IgG pos, IgM neg, malaria neg). Leptospirosis neg. Bcx currently NGTD.     Patient with heavy alcohol use along with hepatic steatosis seen on abd u/s and transaminitis AST >ALT, so liver disease possibly cause of thrombocytopenia and leukopenia, with further depression in value possibly from combination with an infectious cause. Blood smear unremarkable. Elevated immature granulocyte indicating bone marrow functioning. Patients labs in 2017 with no thrombocytopenia or leukopenia.   - ID on board as well  - favoring infectious cause given fever, transaminitis associated with lab abnormalities. negative pcr for ehrlichia/anaplasma may not rule out given ~1-2weeks since onset of symptoms, defer to ID  - clarify if IgM positivity for RMSF indicative of current infection and cause of symptoms with ID  - on doxycycline currently  - pending dengue serologies, quant gold, travel associated blood parasite lab panel      NOTE AND RECOMMENDATIONS NOT FINALIZED UNTIL SIGNED BY ATTENDING         73-year-old male with a history of HTN, HLD, DM2, EtOH abuse, dementia admitted for generalized weakness and poor PO intake over the last 2-3 weeks, MICHAELA, found to have leukopenia and thrombocytopenia along with central clearing rash on R leg, undergoing workup.     #Leukopenia  #Thrombocytopenia  Leukopenia slowly uptrending, thrombocytopenia with bonny 12/12 at 25, uptrending. Differential includes infectious, drug/toxin induced, malignant causes. Under infectious, given recent travel to [place] as well as rash on leg, can consider tick borne illness (RMSF IgM+, IgG negative, lyme neg, vijay, plasma neg, ehrlichia neg, anaplasma neg, babesia neg) vs. viral (HIV negative, dengue serologies pending, CMV IgG pos, IgM neg, EBV IgG pos, IgM neg, malaria neg). Leptospirosis neg. Bcx currently NGTD.     Patient with heavy alcohol use along with hepatic steatosis seen on abd u/s and transaminitis AST >ALT, so liver disease possibly cause of thrombocytopenia and leukopenia, with further depression in value possibly from combination with an infectious cause. Blood smear unremarkable. Elevated immature granulocyte indicating bone marrow functioning. Patients labs in 2017 with no thrombocytopenia or leukopenia.   - ID on board as well  - favoring infectious cause given fever, transaminitis associated with lab abnormalities. negative pcr for ehrlichia/anaplasma may not rule out given ~1-2weeks since onset of symptoms, defer to ID  - clarify if IgM positivity for RMSF indicative of current infection and cause of symptoms with ID  - on doxycycline currently  - pending dengue serologies, quant gold, travel associated blood parasite lab panel      NOTE AND RECOMMENDATIONS NOT FINALIZED UNTIL SIGNED BY ATTENDING         73-year-old male with a history of HTN, HLD, DM2, EtOH abuse, dementia admitted for generalized weakness and poor PO intake over the last 2-3 weeks, MICHAELA, found to have leukopenia and thrombocytopenia along with central clearing rash on R leg, undergoing workup.     #Leukopenia  #Thrombocytopenia  Leukopenia slowly uptrending, thrombocytopenia with bonny 12/12 at 25, uptrending. Differential includes infectious, drug/toxin induced, bone marrow related. Under infectious, given recent travel to [place] as well as rash on leg, can consider tick borne illness (RMSF IgM+, IgG negative, lyme neg, vijay, plasma neg, ehrlichia neg, anaplasma neg, babesia neg) vs. viral (HIV negative, dengue serologies pending, CMV IgG pos, IgM neg, EBV IgG pos, IgM neg, malaria neg). Leptospirosis neg. Bcx currently NGTD.     Patient with heavy alcohol use along with hepatic steatosis seen on abd u/s and transaminitis AST >ALT, so liver disease possibly cause of thrombocytopenia and leukopenia, with further depression in value possibly from combination with an infectious cause. Blood smear unremarkable. Elevated immature granulocyte indicating bone marrow functioning. Patients labs in 2017 with no thrombocytopenia or leukopenia.   - ID on board as well  - favoring infectious cause given fever, transaminitis associated with lab abnormalities. negative pcr for ehrlichia/anaplasma may not rule out given ~1-2weeks since onset of symptoms, defer to ID  - clarify if IgM positivity for RMSF indicative of current infection and cause of symptoms with ID  - on doxycycline currently  - pending dengue serologies, quant gold, travel associated blood parasite lab panel  - can consider bone marrow biopsy if no improvement      NOTE AND RECOMMENDATIONS NOT FINALIZED UNTIL SIGNED BY ATTENDING         73-year-old male with a history of HTN, HLD, DM2, EtOH abuse, dementia admitted for generalized weakness and poor PO intake over the last 2-3 weeks, MICHAELA, found to have leukopenia and thrombocytopenia along with central clearing rash on R leg, undergoing workup.     #Leukopenia  #Thrombocytopenia  Leukopenia slowly uptrending, thrombocytopenia with bonny 12/12 at 25, uptrending. Differential includes infectious, drug/toxin induced, bone marrow related. Under infectious, given recent travel to [place] as well as rash on leg, can consider tick borne illness (RMSF IgM+, IgG negative, lyme neg, vijay, plasma neg, ehrlichia neg, anaplasma neg, babesia neg) vs. viral (HIV negative, dengue serologies pending, CMV IgG pos, IgM neg, EBV IgG pos, IgM neg, malaria neg). Leptospirosis neg. Bcx currently NGTD.     Patient with heavy alcohol use along with hepatic steatosis seen on abd u/s and transaminitis AST >ALT, so liver disease possibly cause of thrombocytopenia and leukopenia, with further depression in value possibly from combination with an infectious cause. Blood smear unremarkable. Elevated immature granulocytes. Patients labs in 2017 with no thrombocytopenia or leukopenia.   - ID on board  - favoring cause of overall thrombocytopenia and leukopenia to be chronic heavy alcohol use with acute decrease due to infectious cause given fever, transaminitis associated with lab abnormalities. negative pcr for ehrlichia/anaplasma may not rule out given ~1-2weeks since onset of symptoms, defer to ID  - clarify if IgM positivity for RMSF indicative of current infection and cause of symptoms with ID  - on doxycycline currently  - pending dengue serologies, quant gold, travel associated blood parasite lab panel  - can consider bone marrow biopsy if no improvement      NOTE AND RECOMMENDATIONS NOT FINALIZED UNTIL SIGNED BY ATTENDING         73-year-old Hungarian-speaking male with a history of HTN, HLD, DM2, EtOH abuse, and dementia admitted for generalized weakness and poor PO intake over the last 2-3 weeks as well as MICHAELA, found to have leukopenia and thrombocytopenia, for which Hematology was consulted, along with central clearing rash on R leg, undergoing workup.     #Leukopenia  #Thrombocytopenia  - Of note, patient had no cytopenias in 2017, but we do not have more recent records than that  - Leukopenia bonny 1.61K on 12/10 and thrombocytopenia bonny 12/12 at 25, both now uptrending  - Peripheral smear showed numerous atypical lymphocytes and some monocytes, as well as some giant platelets  - Differential includes infectious, drug/toxin induced, bone marrow related  - Under infectious, given recent travel to [place] as well as rash on leg, can consider tick borne illness (RMSF IgM+, IgG negative, lyme neg, vijay, plasma neg, ehrlichia neg, anaplasma neg, babesia neg) vs. viral (HIV negative, dengue serologies pending, CMV IgG pos, IgM neg, EBV IgG pos, IgM neg, malaria neg). Leptospirosis neg. Bcx currently NGTD  - Patient with heavy alcohol use along with hepatic steatosis seen on abd u/s and transaminitis with AST >ALT, so liver disease may partially cause of thrombocytopenia and leukopenia  - B12/folate normal  - However, patient does have RMSF IgM elevation as noted above. Suspect may be the primary culprit for cytopenias  - Also pending dengue serologies, quant gold, travel associated blood parasite lab panel  - F/u ID thoughts and recommendations, currently on doxycycline   - No further Heme recommendations at this time, continue to trend counts      NOTE AND RECOMMENDATIONS NOT FINALIZED UNTIL SIGNED BY ATTENDING    Aryles Hedjar, MD, PGY-6  Hematology/Oncology Fellow  [facility]  Pager: [phone]  After 5PM and on weekends and holidays, please call the inpatient fellow on call. 73-year-old Occitan-speaking male with a history of HTN, HLD, DM2, EtOH abuse, and dementia admitted for generalized weakness and poor PO intake over the last 2-3 weeks as well as MICHAELA, found to have leukopenia and thrombocytopenia, for which Hematology was consulted, along with central clearing rash on R leg, undergoing workup.     #Leukopenia  #Thrombocytopenia  - Of note, patient had no cytopenias in 2017, but we do not have more recent records than that  - Leukopenia bonny 1.61K on 12/10 and thrombocytopenia bonny 12/12 at 25, both now uptrending  - Peripheral smear showed numerous atypical lymphocytes and some monocytes, as well as some giant platelets  - Differential includes infectious, drug/toxin induced, bone marrow related  - Under infectious, given recent travel to [place] as well as rash on leg, can consider tick borne illness (RMSF IgM+, IgG negative, lyme neg, vijay, plasma neg, ehrlichia neg, anaplasma neg, babesia neg) vs. viral (HIV negative, dengue serologies pending, CMV IgG pos, IgM neg, EBV IgG pos, IgM neg, malaria neg). Leptospirosis neg. Bcx currently NGTD  - Patient with heavy alcohol use along with hepatic steatosis seen on abd u/s and transaminitis with AST >ALT, so liver disease may partially cause of thrombocytopenia and leukopenia  - B12/folate normal  - However, patient does have RMSF IgM elevation as noted above. Suspect may be the primary culprit for cytopenias  - Also pending dengue serologies, quant gold, travel associated blood parasite lab panel  - F/u ID thoughts and recommendations, currently on doxycycline   - No further Heme recommendations at this time, continue to trend counts      NOTE AND RECOMMENDATIONS NOT FINALIZED UNTIL SIGNED BY ATTENDING    Aryles Hedjar, MD, PGY-6  Hematology/Oncology Fellow  [facility]  Pager: [phone]  After 5PM and on weekends and holidays, please call the inpatient fellow on call.

## 2023-12-13 NOTE — PROGRESS NOTE ADULT - SUBJECTIVE AND OBJECTIVE BOX
INTERNAL MEDICINE PROGRESS NOTE  Amandeep Encinas MD  Please contact via TEAMS    Patient is a 73y old  Male who presents with a chief complaint of MICHAELA, elevated LFTs (12 Dec 2023 11:27)      SUBJECTIVE / OVERNIGHT EVENTS::  No acute overnight events. Pt seen and examined. Denies fevers, chills, CP, SOB, Abdominal pain, N/V, Constipation, Diarrhea      =================Meds=================  MEDICATIONS  (STANDING):  amLODIPine   Tablet 5 milliGRAM(s) Oral daily  atorvastatin 10 milliGRAM(s) Oral at bedtime  dextrose 5%. 1000 milliLiter(s) (100 mL/Hr) IV Continuous <Continuous>  dextrose 5%. 1000 milliLiter(s) (50 mL/Hr) IV Continuous <Continuous>  dextrose 50% Injectable 25 Gram(s) IV Push once  dextrose 50% Injectable 25 Gram(s) IV Push once  dextrose 50% Injectable 12.5 Gram(s) IV Push once  donepezil 5 milliGRAM(s) Oral at bedtime  doxycycline IVPB 100 milliGRAM(s) IV Intermittent every 12 hours  finasteride 5 milliGRAM(s) Oral daily  folic acid 1 milliGRAM(s) Oral daily  glucagon  Injectable 1 milliGRAM(s) IntraMuscular once  insulin lispro (ADMELOG) corrective regimen sliding scale   SubCutaneous three times a day before meals  lactated ringers. 1000 milliLiter(s) (75 mL/Hr) IV Continuous <Continuous>  pantoprazole    Tablet 40 milliGRAM(s) Oral before breakfast  tamsulosin 0.4 milliGRAM(s) Oral at bedtime  thiamine 100 milliGRAM(s) Oral daily    MEDICATIONS  (PRN):  acetaminophen     Tablet .. 650 milliGRAM(s) Oral every 6 hours PRN Temp greater or equal to 38C (100.4F)  dextrose Oral Gel 15 Gram(s) Oral once PRN Blood Glucose LESS THAN 70 milliGRAM(s)/deciliter      I&O's Summary    12 Dec 2023 07:01  -  13 Dec 2023 07:00  --------------------------------------------------------  IN: 240 mL / OUT: 500 mL / NET: -260 mL        =================Vitals=================  Vital Signs Last 24 Hrs  T(C): 36.7 (13 Dec 2023 05:43), Max: 37.3 (12 Dec 2023 20:25)  T(F): 98 (13 Dec 2023 05:43), Max: 99.2 (12 Dec 2023 20:25)  HR: 67 (13 Dec 2023 05:43) (63 - 76)  BP: 113/73 (13 Dec 2023 05:43) (113/73 - 170/84)  BP(mean): --  RR: 18 (13 Dec 2023 05:43) (18 - 18)  SpO2: 99% (13 Dec 2023 05:43) (97% - 99%)    Parameters below as of 13 Dec 2023 05:43  Patient On (Oxygen Delivery Method): room air        =================PHYSICAL EXAM=================  GENERAL: Laying comfortably, NAD  EYES: EOMI, PERRL, no scleral icterus  NECK: No JVD  LUNG: Clear to auscultation bilaterally; No wheeze, crackles or rhonci  HEART: Regular rate and rhythm; No murmurs, rubs, or gallops  ABDOMEN: Soft, Nontender, Nondistended  EXTREMITIES:  No LE edema, 2+ Peripheral Pulses, No clubbing, cyanosis, or edema  PSYCH: AAOx3  NEUROLOGY: non-focal, strength 5/5 in all extremities, sensation intact  SKIN: No rashes or lesions      ===================LABS=======================                        12.0   2.70  )-----------( 26       ( 12 Dec 2023 13:22 )             35.9     Auto Eosinophil # x     / Auto Eosinophil % x     / Auto Neutrophil # x     / Auto Neutrophil % x     / BANDS % x                            13.3   2.26  )-----------( 27       ( 12 Dec 2023 06:53 )             40.1     Auto Eosinophil # 0.03  / Auto Eosinophil % 1.3   / Auto Neutrophil # 0.72  / Auto Neutrophil % 31.8  / BANDS % x        12-12    140  |  103  |  32<H>  ----------------------------<  106<H>  4.0   |  25  |  2.17<H>    Ca    8.8      12 Dec 2023 06:56  Mg     1.6     12-12  Phos  2.3     12-12  TPro  6.2  /  Alb  3.2<L>  /  TBili  0.4  /  DBili  x   /  AST  234<H>  /  ALT  162<H>  /  AlkPhos  37<L>  12-12    PT/INR - ( 12 Dec 2023 13:22 )   PT: 11.2 sec;   INR: 1.07 ratio         PTT - ( 12 Dec 2023 13:22 )  PTT:39.0 sec      Urinalysis Basic - ( 12 Dec 2023 06:56 )    Color: x / Appearance: x / SG: x / pH: x  Gluc: 106 mg/dL / Ketone: x  / Bili: x / Urobili: x   Blood: x / Protein: x / Nitrite: x   Leuk Esterase: x / RBC: x / WBC x   Sq Epi: x / Non Sq Epi: x / Bacteria: x          ===============Radiology & Additional Tests==================

## 2023-12-13 NOTE — PROGRESS NOTE ADULT - PROBLEM SELECTOR PLAN 3
son reports patient drink 1/2 bottle or more of hard liquor daily, last drink November 28  - has previously tried to quit  - never had withdrawals, outside window for CIWA   - SBIRT consult   - daily folic acid, thiamine   - abdominal ultrasound shows diffuse hepatic steatosis  - f/u hepatitis panel i/s/o transaminitis

## 2023-12-13 NOTE — DIETITIAN INITIAL EVALUATION ADULT - PROBLEM SELECTOR PLAN 5
- Cw amlodipine, pt was previously on lisinopril stopped due to MICHAELA in [place] - Cw amlodipine, pt was previously on lisinopril stopped due to MICHAELA in [place]

## 2023-12-13 NOTE — DIETITIAN INITIAL EVALUATION ADULT - PROBLEM SELECTOR PLAN 1
- unclear etiology, but possibly related to Tick borne illness given duration of symptoms, along with central clearing rash behind right calf, and leukopenia + thrombocytopenia   - EKG shows NSR  - continue doxycyline   - f/u tick panel, blood cultures, EBV, CMV   - consult ID this morning   - will send peripheral smear

## 2023-12-13 NOTE — DIETITIAN INITIAL EVALUATION ADULT - ADD RECOMMEND
1) Recommend liberalizing diet to Low Sodium in setting of suboptimal PO intake. Defer diet texture/consistency to medical team.  2) Recommend Ensure Plus High Protein 3x/day to optimize protein-energy intake. Monitor glucose fingersticks.  3) REFEEDING RISK: Consider adding multivitamin daily for refeeding risk, micronutrient coverage and history of ETOH use unless contraindicated. Continue Folic Acid and Thiamine in setting of ETOH use and for refeeding risk unless contraindicated.   4) Monitor and encourage adequate PO intake, obtain food preferences and honor as able.  5) REFEEDING RISK: Monitor daily potassium, magnesium and phosphorous in setting of refeeding risk.   6) Monitor bowel movements and bowel movement regularity. Consider adding bowel regimen pending no medical contraindications, pt reporting no BM x 4 days.   7) Monitor nutritional intake, tolerance to diet prescription, weights, labs, and skin integrity.   8) Malnutrition alert placed in chart.  1) Recommend liberalizing diet to Consistent Carbohydrate, Low Sodium in setting of suboptimal PO intake. Defer diet texture/consistency to medical team.  2) Recommend Ensure Plus High Protein 3x/day to optimize protein-energy intake. Monitor glucose fingersticks.  3) REFEEDING RISK: Consider adding multivitamin daily for refeeding risk, micronutrient coverage and history of ETOH use unless contraindicated. Continue Folic Acid and Thiamine in setting of ETOH use and for refeeding risk unless contraindicated.   4) Monitor and encourage adequate PO intake, obtain food preferences and honor as able.  5) REFEEDING RISK: Monitor daily potassium, magnesium and phosphorous in setting of refeeding risk.   6) Monitor bowel movements and bowel movement regularity. Consider adding bowel regimen pending no medical contraindications, pt reporting no BM x 4 days.   7) Monitor nutritional intake, tolerance to diet prescription, weights, labs, and skin integrity.   8) Malnutrition alert placed in chart.

## 2023-12-13 NOTE — DISCHARGE NOTE PROVIDER - HOSPITAL COURSE
73-year-old male with a history of hypertension hyperlipidemia diabetes EtOH abuse dementia presenting with generalized weakness subjective fevers and decreased PO intake since he returned for the [facility] [place] on December 1st, Patient went to a hospital in the [place] for 1 episode of nausea and vomiting. Patient was subsequently released from the hospital and upon returning to the [place] he went to [facility] for weakness and a fall. At that hospital he had a negative traumatic workup and was found to have acute kidney injury, He received IV fluids and his kidney function resolved.  Patient had renal ultrasounds which were unremarkable. Patient was diagnosed with transaminitis and pancytopenia likely secondary to alcohol. Other than weakness, patient denies any fever, chills, chest pain, shortness of breath, nausea, vomiting, abdominal pain, constipation, or diarrhea     On arrival, Temp 99.7, HR 86, /66, RR 16, SpO2 97% on room air. Labs remarkable for WBC 1.61, plt 49, PTT 39.8, , mg 1.3, BUN 44, creatinine 3.5, glucose 137, , . Chest xray showed clear lungs. In the ED, patient was given 1x dose of doxycycline and 1L of IVF.     ID was consulted for infectious workup. EBV, CMV, Hepatitis, HIV, Tb panels negative. CT head negative.   RMSF IGM positive and patient was continued treatment with doxycycline.  Dengue resulted ______  Hematology was consulted for low platelets <50. They recommended ____ .  Pt also had an MICHAELA on admission which improved with IVF, likely prerenal i/s/o poor po intake.     73-year-old male with a history of hypertension hyperlipidemia diabetes EtOH abuse dementia presenting with generalized weakness subjective fevers and decreased PO intake since he returned for the [facility] [place] on December 1st, Patient went to a hospital in the [place] for 1 episode of nausea and vomiting. Patient was subsequently released from the hospital and upon returning to the [place] he went to [facility] for weakness and a fall. At that hospital he had a negative traumatic workup and was found to have acute kidney injury, He received IV fluids and his kidney function resolved.  Patient had renal ultrasounds which were unremarkable. Patient was diagnosed with transaminitis and pancytopenia likely secondary to alcohol. Other than weakness, patient denies any fever, chills, chest pain, shortness of breath, nausea, vomiting, abdominal pain, constipation, or diarrhea     On arrival, Temp 99.7, HR 86, /66, RR 16, SpO2 97% on room air. Labs remarkable for WBC 1.61, plt 49, PTT 39.8, , mg 1.3, BUN 44, creatinine 3.5, glucose 137, , . Chest xray showed clear lungs. In the ED, patient was given 1x dose of doxycycline and 1L of IVF.     ID was consulted for infectious workup. EBV, CMV, Hepatitis, HIV, Tb panels negative. CT head negative.   RMSF IGM positive and patient was continued treatment with doxycycline.  Dengue resulted ______  Hematology was consulted for low platelets <50. They recommended ____ .  Pt also had an MICHAELA on admission which improved with IVF, likely prerenal i/s/o poor po intake.     73-year-old male with a history of hypertension hyperlipidemia diabetes EtOH abuse dementia presenting with generalized weakness subjective fevers and decreased PO intake since he returned for the [facility] [place] on December 1st, Patient went to a hospital in the [place] for 1 episode of nausea and vomiting. Patient was subsequently released from the hospital and upon returning to the [place] he went to [facility] for weakness and a fall. At that hospital he had a negative traumatic workup and was found to have acute kidney injury, He received IV fluids and his kidney function resolved.  Patient had renal ultrasounds which were unremarkable. Patient was diagnosed with transaminitis and pancytopenia likely secondary to alcohol. Other than weakness, patient denies any fever, chills, chest pain, shortness of breath, nausea, vomiting, abdominal pain, constipation, or diarrhea     On arrival, Temp 99.7, HR 86, /66, RR 16, SpO2 97% on room air. Labs remarkable for WBC 1.61, plt 49, PTT 39.8, , mg 1.3, BUN 44, creatinine 3.5, glucose 137, , . Chest xray showed clear lungs. In the ED, patient was given 1x dose of doxycycline and 1L of IVF.     ID was consulted for infectious workup. EBV, CMV, Hepatitis, HIV, Tb panels negative. CT head negative.   RMSF IGM positive and patient was continued treatment with doxycycline.  Dengue resulted ______  Hematology was consulted for low platelets <50 and low WBC <3. They thought that this was due to the infection and did not recommend any intervention.  Pt also had an MICHAELA on admission which improved with IVF, likely prerenal i/s/o poor po intake.     73-year-old male with a history of hypertension hyperlipidemia diabetes EtOH abuse dementia presenting with generalized weakness subjective fevers and decreased PO intake since he returned for the [facility] [place] on December 1st, Patient went to a hospital in the [place] for 1 episode of nausea and vomiting. Patient was subsequently released from the hospital and upon returning to the [place] he went to [facility] for weakness and a fall. At that hospital he had a negative traumatic workup and was found to have acute kidney injury, He received IV fluids and his kidney function resolved.  Patient had renal ultrasounds which were unremarkable. Patient was diagnosed with transaminitis and pancytopenia likely secondary to alcohol. Other than weakness, patient denies any fever, chills, chest pain, shortness of breath, nausea, vomiting, abdominal pain, constipation, or diarrhea     On arrival, Temp 99.7, HR 86, /66, RR 16, SpO2 97% on room air. Labs remarkable for WBC 1.61, plt 49, PTT 39.8, , mg 1.3, BUN 44, creatinine 3.5, glucose 137, , . Chest xray showed clear lungs. In the ED, patient was given 1x dose of doxycycline and 1L of IVF.     ID was consulted for infectious workup. EBV, CMV, Hepatitis, HIV, Tb panels negative. CT head negative.   RMSF IGM positive and patient was continued treatment with doxycycline.  Dengue resulted ______  Hematology was consulted for low platelets <50 and low WBC <3. They thought that this was due to the infection and did not recommend any intervention.  Pt also had an MICHAELA on admission which improved with IVF, likely prerenal i/s/o poor po intake.     73-year-old male with a history of hypertension hyperlipidemia diabetes EtOH abuse dementia presenting with generalized weakness subjective fevers and decreased PO intake since he returned for the [place] on December 1st, Patient went to a hospital in the [place] for 1 episode of nausea and vomiting. Patient was subsequently released from the hospital and upon returning to the [place] he went to [facility] for weakness and a fall. At that hospital he had a negative traumatic workup and was found to have acute kidney injury, He received IV fluids and his kidney function resolved.  Patient had renal ultrasounds which were unremarkable. Patient was diagnosed with transaminitis and pancytopenia likely secondary to alcohol. Other than weakness, patient denies any fever, chills, chest pain, shortness of breath, nausea, vomiting, abdominal pain, constipation, or diarrhea     On arrival, Temp 99.7, HR 86, /66, RR 16, SpO2 97% on room air. Labs remarkable for WBC 1.61, plt 49, PTT 39.8, , mg 1.3, BUN 44, creatinine 3.5, glucose 137, , . Chest xray showed clear lungs. In the ED, patient was given 1x dose of doxycycline and 1L of IVF.     ID was consulted for infectious workup. EBV, CMV, Hepatitis, HIV, Tb panels negative. CT head negative.   RMSF IGM positive and patient was continued treatment with doxycycline.  Hematology was consulted for low platelets <50 and low WBC <3. They thought that this was due to the infection and did not recommend any intervention.  Pt also had an MICHAELA on admission which improved with IVF, likely prerenal i/s/o poor po intake.    By day of discharge, pt's mental status improved markedly, platelets were uptrending to ~100, and pt was stable for discharge.     MEDICATION CHANGES:  Doxycycline 100mg oral BID    73-year-old male with a history of hypertension hyperlipidemia diabetes EtOH abuse dementia presenting with generalized weakness subjective fevers and decreased PO intake since he returned for the [place] on December 1st, Patient went to a hospital in the [place] for 1 episode of nausea and vomiting. Patient was subsequently released from the hospital and upon returning to the [place] he went to [facility] for weakness and a fall. At that hospital he had a negative traumatic workup and was found to have acute kidney injury, He received IV fluids and his kidney function resolved.  Patient had renal ultrasounds which were unremarkable. Patient was diagnosed with transaminitis and pancytopenia likely secondary to alcohol. Other than weakness, patient denies any fever, chills, chest pain, shortness of breath, nausea, vomiting, abdominal pain, constipation, or diarrhea     On arrival, Temp 99.7, HR 86, /66, RR 16, SpO2 97% on room air. Labs remarkable for WBC 1.61, plt 49, PTT 39.8, , mg 1.3, BUN 44, creatinine 3.5, glucose 137, , . Chest xray showed clear lungs. In the ED, patient was given 1x dose of doxycycline and 1L of IVF.     ID was consulted for infectious workup. EBV, CMV, Hepatitis, HIV, Tb panels negative. CT head negative.   RMSF IGM positive and patient was continued treatment with doxycycline.  Hematology was consulted for low platelets <50 and low WBC <3. They thought that this was due to the infection and did not recommend any intervention.  Pt also had an MICHAELA on admission which improved with IVF, likely prerenal i/s/o poor po intake.    By day of discharge, pt's mental status improved markedly, platelets were uptrending to ~100, and pt was stable for discharge.     MEDICATION CHANGES:  Doxycycline 100mg oral BID    73-year-old male with a history of hypertension hyperlipidemia diabetes EtOH abuse dementia presenting with generalized weakness subjective fevers and decreased PO intake since he returned for the [place] on December 1st, Patient went to a hospital in the [place] for 1 episode of nausea and vomiting. Patient was subsequently released from the hospital and upon returning to the [place] he went to [facility] for weakness and a fall. At that hospital he had a negative traumatic workup and was found to have acute kidney injury, He received IV fluids and his kidney function resolved.  Patient had renal ultrasounds which were unremarkable. Patient was diagnosed with transaminitis and pancytopenia likely secondary to alcohol. Other than weakness, patient denies any fever, chills, chest pain, shortness of breath, nausea, vomiting, abdominal pain, constipation, or diarrhea     On arrival, Temp 99.7, HR 86, /66, RR 16, SpO2 97% on room air. Labs remarkable for WBC 1.61, plt 49, PTT 39.8, , mg 1.3, BUN 44, creatinine 3.5, glucose 137, , . Chest xray showed clear lungs. In the ED, patient was given 1x dose of doxycycline and 1L of IVF.     ID was consulted for infectious workup. EBV, CMV, Hepatitis, HIV, Tb panels negative. CT head negative.   RMSF IGM positive and patient was continued treatment with doxycycline.  Hematology was consulted for low platelets <50 and low WBC <3. They thought that this was due to the infection and did not recommend any intervention.  Pt also had an MICHAELA on admission which improved with IVF, likely prerenal i/s/o poor po intake. Cr was elevated to 1.40, which pt will need to follow up with PCP on.    By day of discharge, pt's mental status improved markedly, platelets were uptrending to ~100, and pt was stable for discharge.     MEDICATION CHANGES:  Doxycycline 100mg oral BID    73-year-old male with a history of hypertension hyperlipidemia diabetes EtOH abuse dementia presenting with generalized weakness subjective fevers and decreased PO intake since he returned for the [place] on December 1st, Patient went to a hospital in the [place] for 1 episode of nausea and vomiting. Patient was subsequently released from the hospital and upon returning to the [place] he went to [facility] for weakness and a fall. At that hospital he had a negative traumatic workup and was found to have acute kidney injury, He received IV fluids and his kidney function resolved.  Patient had renal ultrasounds which were unremarkable. Patient was diagnosed with transaminitis and pancytopenia likely secondary to alcohol. Other than weakness, patient denies any fever, chills, chest pain, shortness of breath, nausea, vomiting, abdominal pain, constipation, or diarrhea     On arrival, Temp 99.7, HR 86, /66, RR 16, SpO2 97% on room air. Labs remarkable for WBC 1.61, plt 49, PTT 39.8, , mg 1.3, BUN 44, creatinine 3.5, glucose 137, , . Chest xray showed clear lungs. In the ED, patient was given 1x dose of doxycycline and 1L of IVF.     ID was consulted for infectious workup. EBV, CMV, Hepatitis, HIV, Tb panels negative. CT head negative.   RMSF IGM positive and patient was continued treatment with doxycycline.  Hematology was consulted for low platelets <50 and low WBC <3. They thought that this was due to the infection and did not recommend any intervention.  Pt also had an MICHAELA on admission which improved with IVF, likely prerenal i/s/o poor po intake. Cr was elevated to 1.40, which pt will need to follow up with PCP on.    By day of discharge, pt's mental status improved markedly, platelets were uptrending to ~100, and pt was stable for discharge.     MEDICATION CHANGES:  Doxycycline 100mg oral BID    73-year-old male with a history of hypertension hyperlipidemia diabetes EtOH abuse dementia presenting with generalized weakness subjective fevers and decreased PO intake since he returned for the [place] on December 1st, Patient went to a hospital in the [place] for 1 episode of nausea and vomiting. Patient was subsequently released from the hospital and upon returning to the [place] he went to [facility] for weakness and a fall. At that hospital he had a negative traumatic workup and was found to have acute kidney injury, He received IV fluids and his kidney function resolved.  Patient had renal ultrasounds which were unremarkable. Patient was diagnosed with transaminitis and pancytopenia likely secondary to alcohol. Other than weakness, patient denies any fever, chills, chest pain, shortness of breath, nausea, vomiting, abdominal pain, constipation, or diarrhea     On arrival, Temp 99.7, HR 86, /66, RR 16, SpO2 97% on room air. Labs remarkable for WBC 1.61, plt 49, PTT 39.8, , mg 1.3, BUN 44, creatinine 3.5, glucose 137, , . Chest xray showed clear lungs. In the ED, patient was given 1x dose of doxycycline and 1L of IVF.     ID was consulted for infectious workup. EBV, CMV, Hepatitis, HIV, Tb panels negative. CT head negative.   RMSF IGM positive and patient was continued treatment with doxycycline.  Hematology was consulted for low platelets <50 and low WBC <3. They thought that this was due to the infection and did not recommend any intervention.  Pt also had an MICHAELA on admission which improved with IVF, likely prerenal i/s/o poor po intake. Cr was elevated to 1.40, which pt will need to follow up with PCP on.    By day of discharge, pt's mental status improved markedly, platelets were uptrending to ~100, and pt was stable for discharge.     MEDICATION CHANGES:  Doxycycline 100mg oral BID   73-year-old male with a history of hypertension hyperlipidemia diabetes EtOH abuse dementia presenting with generalized weakness subjective fevers and decreased PO intake since he returned for the [place] on December 1st, Patient went to a hospital in the [place] for 1 episode of nausea and vomiting. Patient was subsequently released from the hospital and upon returning to the [place] he went to [facility] for weakness and a fall. At that hospital he had a negative traumatic workup and was found to have acute kidney injury, He received IV fluids and his kidney function resolved.  Patient had renal ultrasounds which were unremarkable. Patient was diagnosed with transaminitis and pancytopenia likely secondary to alcohol. Other than weakness, patient denies any fever, chills, chest pain, shortness of breath, nausea, vomiting, abdominal pain, constipation, or diarrhea     On arrival, Temp 99.7, HR 86, /66, RR 16, SpO2 97% on room air. Labs remarkable for WBC 1.61, plt 49, PTT 39.8, , mg 1.3, BUN 44, creatinine 3.5, glucose 137, , . Chest xray showed clear lungs. In the ED, patient was given 1x dose of doxycycline and 1L of IVF.     ID was consulted for infectious workup. EBV, CMV, Hepatitis, HIV, Tb panels negative. CT head negative.   RMSF IGM positive and patient was continued treatment with doxycycline.  Hematology was consulted for low platelets <50 and low WBC <3. They thought that this was due to the infection and did not recommend any intervention.  Pt also had an MICHAELA on admission which improved with IVF, likely prerenal i/s/o poor po intake. Cr was elevated to 1.40, which pt will need to follow up with PCP on.    By day of discharge, pt's mental status improved markedly, platelets were uptrending to ~100, and pt was stable for discharge.     MEDICATION CHANGES:  Doxycycline 100mg oral BID

## 2023-12-13 NOTE — DIETITIAN INITIAL EVALUATION ADULT - OTHER INFO
Weights:  - UBW (per patient): 179 pounds (8 days ago)  - Dosing Weight (per chart): 165.7 pounds (12/11) (bed)  - Daily Weights (per flow sheets): 164.9 pounds (12/13)  - Bed Scale Weight (taken by RD): 165.66 pounds (12/13)  - Per [facility] HIE: 165.13 pounds (12/11)  Pt with reported possible ~13 pound weight loss x ~8 days, clinically significant (7.4%). RD to continue to monitor weights and trends as available/able.     Pt with SIRS (per chart).  - Per chart, "possibly related to Tick borne illness."   - Ordered for Doxycycline (per orders).     Pt with alcohol abuse (per chart).  - Per chart, "son reports patient drink 1/2 bottle or more of hard liquor daily, last drink November 28."  - Ordered for Folic Acid and Thiamine (per orders).     Pt with history of diabetes (per chart).  - A1C 6.1% (12/12) (per chart) indicating fair glycemic control for age.  - Average Glucose 128 mg/dL (12/12) (per chart).  - Ordered for Insulin Lispro (ADMELOG) Corrective Regimen Sliding Scale (per orders).     Labs:  - Magnesium low today, 1.3 mg/dL (12/13) (per chart). Pt ordered for Magnesium Sulfate IVPB (per orders).     Ordered for IV Lactated Ringers in-house (per orders).  Weights:  - UBW (per patient): 179 pounds (8 days ago)  - Dosing Weight (per chart): 165.7 pounds (12/11) (bed)  - Daily Weights (per flow sheets): 164.9 pounds (12/13)  - Bed Scale Weight (taken by RD): 165.66 pounds (12/13)  - Per [facility] HIE: 165.13 pounds (12/11)  Pt with reported possible ~13 pound weight loss x ~8 days, clinically significant (7.4%). RD to continue to monitor weights and trends as available/able.     Pt with SIRS (per chart).  - Per chart, "possibly related to Tick borne illness."   - Ordered for Doxycycline (per orders).     Pt with alcohol abuse (per chart).  - Per chart, "son reports patient drink 1/2 bottle or more of hard liquor daily, last drink November 28."  - Ordered for Folic Acid and Thiamine (per orders).     Pt with history of diabetes (per chart).  - A1C 6.1% (12/12) (per chart) indicating fair glycemic control for age.  - Average Glucose 128 mg/dL (12/12) (per chart).  - Ordered for Insulin Lispro (ADMELOG) Corrective Regimen Sliding Scale (per orders).     Labs:  - Magnesium low today, 1.3 mg/dL (12/13) (per chart). Pt ordered for Magnesium Sulfate IVPB (per orders).     Ordered for IV Lactated Ringers in-house (per orders).

## 2023-12-13 NOTE — DIETITIAN INITIAL EVALUATION ADULT - NSFNSADHERENCEPTAFT_GEN_A_CORE
Prior to admission, pt's son stated the pt did not follow any type of therapeutic diet at home. Pt's son stated at baseline, the pt eats ~3 meals per day. Pt's son stated the pt has been eating "nothing" except for drinking juices occasionally over the past ~10 days. Pt's son stated the pt typically is very active at baseline, states the pt used to walk everyday, reported significantly decreased physical activity over the past few weeks.    Pt's son stated the pt does not follow any type of therapeutic diet at home for diabetes, stated the pt checks his fingersticks occasionally. Pt's son stated the pt has not been taking any medications for diabetes as of recent. No diabetic medications noted in H&P.  Prior to admission, pt's son stated the pt did not follow any type of therapeutic diet at home. Pt's son stated at baseline, the pt has a good appetite, eats ~3 meals per day. Pt's son stated the pt has been eating "nothing" except for drinking juices occasionally over the past ~10 days. Pt's son stated the pt typically is very active at baseline, states the pt used to walk everyday, reported significantly decreased physical activity over the past few weeks.    Pt's son stated the pt does not follow any type of therapeutic diet at home for diabetes, stated the pt checks his fingersticks occasionally. Pt's son stated the pt has not been taking any medications for diabetes as of recent. No diabetic medications noted in H&P.

## 2023-12-13 NOTE — DIETITIAN INITIAL EVALUATION ADULT - PHYSCIAL ASSESSMENT
Nutrition focused physical exam conducted with pt's verbal consent, son's verbal consent and son's presence.

## 2023-12-13 NOTE — PROGRESS NOTE ADULT - PROBLEM SELECTOR PLAN 1
- Fever, unclear etiology, but possibly related to Tick borne illness given duration of symptoms, along with central clearing rash behind right calf, and leukopenia + thrombocytopenia   - EKG shows NSR  >> RMSF IGM positive (12/12)  - continue doxycyline   - EBV (-), CMV (-)  - ID consulted, appreciate recs  - peripheral smear pending  - EBV negative, CMV negative  - dengue pending  - blood culture prelim negative  - CT head negative for acute pathology  - quant gold pending, HIV pending  - Neuro consulted, appreciate recs  - heme consulted, appreciate recs

## 2023-12-13 NOTE — DIETITIAN INITIAL EVALUATION ADULT - PERSON TAUGHT/METHOD
Educated on the importance of meeting adequate protein-energy needs. Encouraged consumption of HBV foods and prioritizing protein foods first at meal times. Encouraged consumption of oral nutrition supplement to optimize protein-energy intake. Encouraged small, frequent meals. Emphasized importance of consuming nutrient dense foods and snacks to optimize energy and protein intake.     RD provided constipation nutrition therapy, encouraged adequate hydration and consumption of fiber-rich foods. Monitor bowel movements and bowel movement regularity. Adjust bowel regimen as needed. Pt and pt's son aware RD remains available./verbal instruction/patient instructed/son instructed

## 2023-12-13 NOTE — DISCHARGE NOTE PROVIDER - NSDCFUADDAPPT_GEN_ALL_CORE_FT
APPTS ARE READY TO BE MADE: [x] YES    Best Family or Patient Contact (if needed):    Additional Information about above appointments (if needed):    1: Medicine clinic - 1 week f/u   2:   3:     Other comments or requests:    APPTS ARE READY TO BE MADE: [x] YES    Best Family or Patient Contact (if needed):    Additional Information about above appointments (if needed):    1: Medicine clinic - 1 week f/u   2:   3:     Other comments or requests:   Patient's sister Perri will return our call if she needs scheduling assistance. She advised there is a provider she would like her father to see but she left his information at her job so she needs to get the paperwork from her job and call us back.

## 2023-12-13 NOTE — DIETITIAN INITIAL EVALUATION ADULT - PERTINENT MEDS FT
MEDICATIONS  (STANDING):  amLODIPine   Tablet 5 milliGRAM(s) Oral daily  atorvastatin 10 milliGRAM(s) Oral at bedtime  dextrose 5%. 1000 milliLiter(s) (50 mL/Hr) IV Continuous <Continuous>  dextrose 5%. 1000 milliLiter(s) (100 mL/Hr) IV Continuous <Continuous>  dextrose 50% Injectable 25 Gram(s) IV Push once  dextrose 50% Injectable 25 Gram(s) IV Push once  dextrose 50% Injectable 12.5 Gram(s) IV Push once  donepezil 5 milliGRAM(s) Oral at bedtime  doxycycline IVPB 100 milliGRAM(s) IV Intermittent every 12 hours  finasteride 5 milliGRAM(s) Oral daily  folic acid 1 milliGRAM(s) Oral daily  glucagon  Injectable 1 milliGRAM(s) IntraMuscular once  insulin lispro (ADMELOG) corrective regimen sliding scale   SubCutaneous three times a day before meals  lactated ringers. 1000 milliLiter(s) (75 mL/Hr) IV Continuous <Continuous>  magnesium sulfate  IVPB 2 Gram(s) IV Intermittent once  pantoprazole    Tablet 40 milliGRAM(s) Oral before breakfast  tamsulosin 0.4 milliGRAM(s) Oral at bedtime  thiamine 100 milliGRAM(s) Oral daily    MEDICATIONS  (PRN):  acetaminophen     Tablet .. 650 milliGRAM(s) Oral every 6 hours PRN Temp greater or equal to 38C (100.4F)  dextrose Oral Gel 15 Gram(s) Oral once PRN Blood Glucose LESS THAN 70 milliGRAM(s)/deciliter

## 2023-12-13 NOTE — DIETITIAN INITIAL EVALUATION ADULT - NSFNSNUTRCHEWSWALLOWFT_GEN_A_CORE
Pt's son stated the pt does not have any swallowing difficulties. Pt's son stated the pt does have difficulty chewing harder foods due to missing teeth, however does not want the pt's diet to be downgraded. Pt's son stated he prefers to order softer foods for the patient. Defer diet texture/consistency to Speech Language Pathologist/Medical Team.

## 2023-12-13 NOTE — PROGRESS NOTE ADULT - SUBJECTIVE AND OBJECTIVE BOX
Follow Up:  thrombocytopenia    Interval History/ROS:  more alert  - fatigued  - not nearly as active as usual,  not eating    Allergies  No Known Allergies    ANTIMICROBIALS:  doxycycline IVPB 100 every 12 hours      OTHER MEDS:  MEDICATIONS  (STANDING):  acetaminophen     Tablet .. 650 every 6 hours PRN  amLODIPine   Tablet 5 daily  atorvastatin 10 at bedtime  dextrose 50% Injectable 25 once  dextrose 50% Injectable 12.5 once  dextrose 50% Injectable 25 once  dextrose Oral Gel 15 once PRN  donepezil 5 at bedtime  finasteride 5 daily  glucagon  Injectable 1 once  insulin lispro (ADMELOG) corrective regimen sliding scale  three times a day before meals  pantoprazole    Tablet 40 before breakfast  polyethylene glycol 3350 17 daily  senna 2 at bedtime  tamsulosin 0.4 at bedtime      Vital Signs Last 24 Hrs  T(C): 36.8 (13 Dec 2023 14:35), Max: 37.3 (12 Dec 2023 20:25)  T(F): 98.2 (13 Dec 2023 14:35), Max: 99.2 (12 Dec 2023 20:25)  HR: 76 (13 Dec 2023 14:35) (67 - 79)  BP: 125/81 (13 Dec 2023 14:35) (112/63 - 170/84)  BP(mean): --  RR: 18 (13 Dec 2023 14:35) (18 - 18)  SpO2: 98% (13 Dec 2023 14:35) (98% - 99%)    Parameters below as of 13 Dec 2023 14:35  Patient On (Oxygen Delivery Method): room air        PHYSICAL EXAM:  General: WN/WD NAD, Non-toxic  Neurology: A&Ox3, nonfocal  Respiratory: Clear to auscultation bilaterally  CV: RRR, S1S2, no murmurs, rubs or gallops  Abdominal: Soft, Non-tender, non-distended, normal bowel sounds  Extremities: No edema  Line Sites: Clear  Skin: No rash                          13.1   2.97  )-----------( 34       ( 13 Dec 2023 07:30 )             38.1   WBC Count: 2.97 (12-13 @ 07:30)  WBC Count: 2.70 (12-12 @ 13:22)  WBC Count: 2.26 (12-12 @ 06:53)  WBC Count: 1.59 (12-11 @ 07:02)  WBC Count: 1.61 (12-10 @ 22:53)    12-13    142  |  105  |  22  ----------------------------<  101<H>  3.7   |  25  |  1.65<H>  Creatinine: 1.65 (12-13 @ 07:26)    Creatinine: 2.17 (12-12 @ 06:56)    Creatinine: 2.79 (12-11 @ 07:02)    Creatinine: 3.50 (12-10 @ 22:53)      Ca    8.7      13 Dec 2023 07:26  Phos  2.8     12-13  Mg     1.3     12-13    TPro  6.5  /  Alb  3.5  /  TBili  0.4  /  DBili  x   /  AST  173<H>  /  ALT  128<H>  /  AlkPhos  37<L>  12-13        MICROBIOLOGY:  .Blood Blood  12-11-23   No Blood Parasites observed by giemsa stain  One negative set of blood smears does not rule out  the possibility of a parasitic infection.  A minimum of 3  specimens should be collected, at least 12-24 hours apart,  over a 36 hour time period.  ************************************************************  NEGATIVE for Plasmodium antigens. Microscopy is performed for  confirmation.  The Malaria Rapid antigen test does not detect the  presence of Babesia species. If Babesiosis is suspected  please order test Babesia PCR: Babesia microti PCR Bld  ************************************************************  --  --      Clean Catch Clean Catch (Midstream)  12-11-23   No growth  --  --      .Blood Blood  12-10-23   No growth at 48 Hours  --  --      .Blood Blood  12-10-23   No growth at 48 Hours  --  --    CMV IgG Antibody: 3.50 U/mL (12-11-23 @ 03:12)    CMVPCR Log: NotDetec [id number]/mL (12-11 @ 03:12)    (12.11.23 @ 03:12)   IgG Jama MT Spotted Fever Antibody: Negative:  Jama MT Spotted Fever Antibody IgM: 2.52: Positive >1.10     RADIOLOGY:    Umberto Moreno MD; Division of Infectious Disease; Pager: [phone]; nights and weekends: [phone]   Follow Up:  thrombocytopenia    Interval History/ROS:  more alert  - fatigued  - not nearly as active as usual,  not eating    Allergies  No Known Allergies    ANTIMICROBIALS:  doxycycline IVPB 100 every 12 hours      OTHER MEDS:  MEDICATIONS  (STANDING):  acetaminophen     Tablet .. 650 every 6 hours PRN  amLODIPine   Tablet 5 daily  atorvastatin 10 at bedtime  dextrose 50% Injectable 25 once  dextrose 50% Injectable 12.5 once  dextrose 50% Injectable 25 once  dextrose Oral Gel 15 once PRN  donepezil 5 at bedtime  finasteride 5 daily  glucagon  Injectable 1 once  insulin lispro (ADMELOG) corrective regimen sliding scale  three times a day before meals  pantoprazole    Tablet 40 before breakfast  polyethylene glycol 3350 17 daily  senna 2 at bedtime  tamsulosin 0.4 at bedtime      Vital Signs Last 24 Hrs  T(C): 36.8 (13 Dec 2023 14:35), Max: 37.3 (12 Dec 2023 20:25)  T(F): 98.2 (13 Dec 2023 14:35), Max: 99.2 (12 Dec 2023 20:25)  HR: 76 (13 Dec 2023 14:35) (67 - 79)  BP: 125/81 (13 Dec 2023 14:35) (112/63 - 170/84)  BP(mean): --  RR: 18 (13 Dec 2023 14:35) (18 - 18)  SpO2: 98% (13 Dec 2023 14:35) (98% - 99%)    Parameters below as of 13 Dec 2023 14:35  Patient On (Oxygen Delivery Method): room air        PHYSICAL EXAM:  General: WN/WD NAD, Non-toxic  Neurology: A&Ox3, nonfocal  Respiratory: Clear to auscultation bilaterally  CV: RRR, S1S2, no murmurs, rubs or gallops  Abdominal: Soft, Non-tender, non-distended, normal bowel sounds  Extremities: No edema  Line Sites: Clear  Skin: No rash                          13.1   2.97  )-----------( 34       ( 13 Dec 2023 07:30 )             38.1   WBC Count: 2.97 (12-13 @ 07:30)  WBC Count: 2.70 (12-12 @ 13:22)  WBC Count: 2.26 (12-12 @ 06:53)  WBC Count: 1.59 (12-11 @ 07:02)  WBC Count: 1.61 (12-10 @ 22:53)    12-13    142  |  105  |  22  ----------------------------<  101<H>  3.7   |  25  |  1.65<H>  Creatinine: 1.65 (12-13 @ 07:26)    Creatinine: 2.17 (12-12 @ 06:56)    Creatinine: 2.79 (12-11 @ 07:02)    Creatinine: 3.50 (12-10 @ 22:53)      Ca    8.7      13 Dec 2023 07:26  Phos  2.8     12-13  Mg     1.3     12-13    TPro  6.5  /  Alb  3.5  /  TBili  0.4  /  DBili  x   /  AST  173<H>  /  ALT  128<H>  /  AlkPhos  37<L>  12-13        MICROBIOLOGY:  .Blood Blood  12-11-23   No Blood Parasites observed by giemsa stain  One negative set of blood smears does not rule out  the possibility of a parasitic infection.  A minimum of 3  specimens should be collected, at least 12-24 hours apart,  over a 36 hour time period.  ************************************************************  NEGATIVE for Plasmodium antigens. Microscopy is performed for  confirmation.  The Malaria Rapid antigen test does not detect the  presence of Babesia species. If Babesiosis is suspected  please order test Babesia PCR: Babesia microti PCR Bld  ************************************************************  --  --      Clean Catch Clean Catch (Midstream)  12-11-23   No growth  --  --      .Blood Blood  12-10-23   No growth at 48 Hours  --  --      .Blood Blood  12-10-23   No growth at 48 Hours  --  --    CMV IgG Antibody: 3.50 U/mL (12-11-23 @ 03:12)    CMVPCR Log: NotDetec Rbu24NN/mL (12-11 @ 03:12)    (12.11.23 @ 03:12)   IgG Jama MT Spotted Fever Antibody: Negative:  Jama MT Spotted Fever Antibody IgM: 2.52: Positive >1.10     RADIOLOGY:    Umberto Moreno MD; Division of Infectious Disease; Pager: [phone]; nights and weekends: [phone]

## 2023-12-13 NOTE — DIETITIAN INITIAL EVALUATION ADULT - NSFNSGIIOFT_GEN_A_CORE
Pt reported no nausea or vomiting at this time.   - Note: Pt is ordered for Protonix (per orders).   Pt's son reported constipation at this time, stated the pt has not had a bowel movement in 4 days.   - Last BM: No recent bowel movements noted in-house. Pt's son reported last BM was 4 days ago.   - Bowel Regimen: Pt not currently ordered for a bowel regimen at this time (per orders).   RD provided constipation nutrition therapy to pt and pt's son, encouraged adequate hydration and consumption of fiber-rich foods. Monitor bowel movements and bowel movement regularity. Adjust bowel regimen as needed.

## 2023-12-13 NOTE — DIETITIAN INITIAL EVALUATION ADULT - ENERGY INTAKE
Poor (<50%) Currently in-house, pt's son stated the pt has been eating <25% of meals provided. Pt's son stated the pt is "not hungry." Limited intake available to assess in flow sheets. RD offered Glucerna oral nutrition supplement to pt and pt's son. Pt's son stated he prefers if the pt receives Ensure, as that is the supplement he drinks at home. Pt's son amenable to pt receiving Ensure Plus High Protein 3x/day in-house to optimize protein-energy intake. RD provided pt with a menu to assist with food preferences and optimize PO intake.

## 2023-12-14 LAB
ALBUMIN SERPL ELPH-MCNC: 3.2 G/DL — LOW (ref 3.3–5)
ALBUMIN SERPL ELPH-MCNC: 3.2 G/DL — LOW (ref 3.3–5)
ALP SERPL-CCNC: 39 U/L — LOW (ref 40–120)
ALP SERPL-CCNC: 39 U/L — LOW (ref 40–120)
ALT FLD-CCNC: 110 U/L — HIGH (ref 10–45)
ALT FLD-CCNC: 110 U/L — HIGH (ref 10–45)
ANION GAP SERPL CALC-SCNC: 11 MMOL/L — SIGNIFICANT CHANGE UP (ref 5–17)
ANION GAP SERPL CALC-SCNC: 11 MMOL/L — SIGNIFICANT CHANGE UP (ref 5–17)
APTT BLD: 34.4 SEC — SIGNIFICANT CHANGE UP (ref 24.5–35.6)
APTT BLD: 34.4 SEC — SIGNIFICANT CHANGE UP (ref 24.5–35.6)
AST SERPL-CCNC: 149 U/L — HIGH (ref 10–40)
AST SERPL-CCNC: 149 U/L — HIGH (ref 10–40)
B BURGDOR DNA SPEC QL NAA+PROBE: NEGATIVE — SIGNIFICANT CHANGE UP
B BURGDOR DNA SPEC QL NAA+PROBE: NEGATIVE — SIGNIFICANT CHANGE UP
BASOPHILS # BLD AUTO: 0.09 K/UL — SIGNIFICANT CHANGE UP (ref 0–0.2)
BASOPHILS # BLD AUTO: 0.09 K/UL — SIGNIFICANT CHANGE UP (ref 0–0.2)
BASOPHILS NFR BLD AUTO: 2.7 % — HIGH (ref 0–2)
BASOPHILS NFR BLD AUTO: 2.7 % — HIGH (ref 0–2)
BILIRUB SERPL-MCNC: 0.5 MG/DL — SIGNIFICANT CHANGE UP (ref 0.2–1.2)
BILIRUB SERPL-MCNC: 0.5 MG/DL — SIGNIFICANT CHANGE UP (ref 0.2–1.2)
BUN SERPL-MCNC: 19 MG/DL — SIGNIFICANT CHANGE UP (ref 7–23)
BUN SERPL-MCNC: 19 MG/DL — SIGNIFICANT CHANGE UP (ref 7–23)
CALCIUM SERPL-MCNC: 8.7 MG/DL — SIGNIFICANT CHANGE UP (ref 8.4–10.5)
CALCIUM SERPL-MCNC: 8.7 MG/DL — SIGNIFICANT CHANGE UP (ref 8.4–10.5)
CHLORIDE SERPL-SCNC: 105 MMOL/L — SIGNIFICANT CHANGE UP (ref 96–108)
CHLORIDE SERPL-SCNC: 105 MMOL/L — SIGNIFICANT CHANGE UP (ref 96–108)
CO2 SERPL-SCNC: 26 MMOL/L — SIGNIFICANT CHANGE UP (ref 22–31)
CO2 SERPL-SCNC: 26 MMOL/L — SIGNIFICANT CHANGE UP (ref 22–31)
CREAT SERPL-MCNC: 1.58 MG/DL — HIGH (ref 0.5–1.3)
CREAT SERPL-MCNC: 1.58 MG/DL — HIGH (ref 0.5–1.3)
EGFR: 46 ML/MIN/1.73M2 — LOW
EGFR: 46 ML/MIN/1.73M2 — LOW
EOSINOPHIL # BLD AUTO: 0.12 K/UL — SIGNIFICANT CHANGE UP (ref 0–0.5)
EOSINOPHIL # BLD AUTO: 0.12 K/UL — SIGNIFICANT CHANGE UP (ref 0–0.5)
EOSINOPHIL NFR BLD AUTO: 3.5 % — SIGNIFICANT CHANGE UP (ref 0–6)
EOSINOPHIL NFR BLD AUTO: 3.5 % — SIGNIFICANT CHANGE UP (ref 0–6)
GAMMA INTERFERON BACKGROUND BLD IA-ACNC: 0.03 IU/ML — SIGNIFICANT CHANGE UP
GAMMA INTERFERON BACKGROUND BLD IA-ACNC: 0.03 IU/ML — SIGNIFICANT CHANGE UP
GLUCOSE BLDC GLUCOMTR-MCNC: 100 MG/DL — HIGH (ref 70–99)
GLUCOSE BLDC GLUCOMTR-MCNC: 100 MG/DL — HIGH (ref 70–99)
GLUCOSE BLDC GLUCOMTR-MCNC: 122 MG/DL — HIGH (ref 70–99)
GLUCOSE BLDC GLUCOMTR-MCNC: 122 MG/DL — HIGH (ref 70–99)
GLUCOSE BLDC GLUCOMTR-MCNC: 182 MG/DL — HIGH (ref 70–99)
GLUCOSE BLDC GLUCOMTR-MCNC: 182 MG/DL — HIGH (ref 70–99)
GLUCOSE BLDC GLUCOMTR-MCNC: 189 MG/DL — HIGH (ref 70–99)
GLUCOSE BLDC GLUCOMTR-MCNC: 189 MG/DL — HIGH (ref 70–99)
GLUCOSE SERPL-MCNC: 98 MG/DL — SIGNIFICANT CHANGE UP (ref 70–99)
GLUCOSE SERPL-MCNC: 98 MG/DL — SIGNIFICANT CHANGE UP (ref 70–99)
HCT VFR BLD CALC: 37.8 % — LOW (ref 39–50)
HCT VFR BLD CALC: 37.8 % — LOW (ref 39–50)
HGB BLD-MCNC: 12.6 G/DL — LOW (ref 13–17)
HGB BLD-MCNC: 12.6 G/DL — LOW (ref 13–17)
INR BLD: 1.05 RATIO — SIGNIFICANT CHANGE UP (ref 0.85–1.18)
INR BLD: 1.05 RATIO — SIGNIFICANT CHANGE UP (ref 0.85–1.18)
LYMPHOCYTES # BLD AUTO: 0.59 K/UL — LOW (ref 1–3.3)
LYMPHOCYTES # BLD AUTO: 0.59 K/UL — LOW (ref 1–3.3)
LYMPHOCYTES # BLD AUTO: 17.7 % — SIGNIFICANT CHANGE UP (ref 13–44)
LYMPHOCYTES # BLD AUTO: 17.7 % — SIGNIFICANT CHANGE UP (ref 13–44)
M TB IFN-G BLD-IMP: NEGATIVE — SIGNIFICANT CHANGE UP
M TB IFN-G BLD-IMP: NEGATIVE — SIGNIFICANT CHANGE UP
M TB IFN-G CD4+ BCKGRND COR BLD-ACNC: 0.11 IU/ML — SIGNIFICANT CHANGE UP
M TB IFN-G CD4+ BCKGRND COR BLD-ACNC: 0.11 IU/ML — SIGNIFICANT CHANGE UP
M TB IFN-G CD4+CD8+ BCKGRND COR BLD-ACNC: 0.13 IU/ML — SIGNIFICANT CHANGE UP
M TB IFN-G CD4+CD8+ BCKGRND COR BLD-ACNC: 0.13 IU/ML — SIGNIFICANT CHANGE UP
MAGNESIUM SERPL-MCNC: 1.4 MG/DL — LOW (ref 1.6–2.6)
MAGNESIUM SERPL-MCNC: 1.4 MG/DL — LOW (ref 1.6–2.6)
MANUAL SMEAR VERIFICATION: SIGNIFICANT CHANGE UP
MANUAL SMEAR VERIFICATION: SIGNIFICANT CHANGE UP
MCHC RBC-ENTMCNC: 29.2 PG — SIGNIFICANT CHANGE UP (ref 27–34)
MCHC RBC-ENTMCNC: 29.2 PG — SIGNIFICANT CHANGE UP (ref 27–34)
MCHC RBC-ENTMCNC: 33.3 GM/DL — SIGNIFICANT CHANGE UP (ref 32–36)
MCHC RBC-ENTMCNC: 33.3 GM/DL — SIGNIFICANT CHANGE UP (ref 32–36)
MCV RBC AUTO: 87.7 FL — SIGNIFICANT CHANGE UP (ref 80–100)
MCV RBC AUTO: 87.7 FL — SIGNIFICANT CHANGE UP (ref 80–100)
MONOCYTES # BLD AUTO: 0.44 K/UL — SIGNIFICANT CHANGE UP (ref 0–0.9)
MONOCYTES # BLD AUTO: 0.44 K/UL — SIGNIFICANT CHANGE UP (ref 0–0.9)
MONOCYTES NFR BLD AUTO: 13.3 % — SIGNIFICANT CHANGE UP (ref 2–14)
MONOCYTES NFR BLD AUTO: 13.3 % — SIGNIFICANT CHANGE UP (ref 2–14)
MRSA PCR RESULT.: SIGNIFICANT CHANGE UP
MRSA PCR RESULT.: SIGNIFICANT CHANGE UP
NEUTROPHILS # BLD AUTO: 1.97 K/UL — SIGNIFICANT CHANGE UP (ref 1.8–7.4)
NEUTROPHILS # BLD AUTO: 1.97 K/UL — SIGNIFICANT CHANGE UP (ref 1.8–7.4)
NEUTROPHILS NFR BLD AUTO: 59.3 % — SIGNIFICANT CHANGE UP (ref 43–77)
NEUTROPHILS NFR BLD AUTO: 59.3 % — SIGNIFICANT CHANGE UP (ref 43–77)
PHOSPHATE SERPL-MCNC: 2.7 MG/DL — SIGNIFICANT CHANGE UP (ref 2.5–4.5)
PHOSPHATE SERPL-MCNC: 2.7 MG/DL — SIGNIFICANT CHANGE UP (ref 2.5–4.5)
PLAT MORPH BLD: NORMAL — SIGNIFICANT CHANGE UP
PLAT MORPH BLD: NORMAL — SIGNIFICANT CHANGE UP
PLATELET # BLD AUTO: 53 K/UL — LOW (ref 150–400)
PLATELET # BLD AUTO: 53 K/UL — LOW (ref 150–400)
POTASSIUM SERPL-MCNC: 3.7 MMOL/L — SIGNIFICANT CHANGE UP (ref 3.5–5.3)
POTASSIUM SERPL-MCNC: 3.7 MMOL/L — SIGNIFICANT CHANGE UP (ref 3.5–5.3)
POTASSIUM SERPL-SCNC: 3.7 MMOL/L — SIGNIFICANT CHANGE UP (ref 3.5–5.3)
POTASSIUM SERPL-SCNC: 3.7 MMOL/L — SIGNIFICANT CHANGE UP (ref 3.5–5.3)
PROT SERPL-MCNC: 6.4 G/DL — SIGNIFICANT CHANGE UP (ref 6–8.3)
PROT SERPL-MCNC: 6.4 G/DL — SIGNIFICANT CHANGE UP (ref 6–8.3)
PROTHROM AB SERPL-ACNC: 11 SEC — SIGNIFICANT CHANGE UP (ref 9.5–13)
PROTHROM AB SERPL-ACNC: 11 SEC — SIGNIFICANT CHANGE UP (ref 9.5–13)
QUANT TB PLUS MITOGEN MINUS NIL: 2.33 IU/ML — SIGNIFICANT CHANGE UP
QUANT TB PLUS MITOGEN MINUS NIL: 2.33 IU/ML — SIGNIFICANT CHANGE UP
RBC # BLD: 4.31 M/UL — SIGNIFICANT CHANGE UP (ref 4.2–5.8)
RBC # BLD: 4.31 M/UL — SIGNIFICANT CHANGE UP (ref 4.2–5.8)
RBC # FLD: 12.1 % — SIGNIFICANT CHANGE UP (ref 10.3–14.5)
RBC # FLD: 12.1 % — SIGNIFICANT CHANGE UP (ref 10.3–14.5)
RBC BLD AUTO: SIGNIFICANT CHANGE UP
RBC BLD AUTO: SIGNIFICANT CHANGE UP
S AUREUS DNA NOSE QL NAA+PROBE: SIGNIFICANT CHANGE UP
S AUREUS DNA NOSE QL NAA+PROBE: SIGNIFICANT CHANGE UP
SODIUM SERPL-SCNC: 142 MMOL/L — SIGNIFICANT CHANGE UP (ref 135–145)
SODIUM SERPL-SCNC: 142 MMOL/L — SIGNIFICANT CHANGE UP (ref 135–145)
VARIANT LYMPHS # BLD: 3.5 % — SIGNIFICANT CHANGE UP (ref 0–6)
VARIANT LYMPHS # BLD: 3.5 % — SIGNIFICANT CHANGE UP (ref 0–6)
WBC # BLD: 3.32 K/UL — LOW (ref 3.8–10.5)
WBC # BLD: 3.32 K/UL — LOW (ref 3.8–10.5)
WBC # FLD AUTO: 3.32 K/UL — LOW (ref 3.8–10.5)
WBC # FLD AUTO: 3.32 K/UL — LOW (ref 3.8–10.5)

## 2023-12-14 PROCEDURE — 99233 SBSQ HOSP IP/OBS HIGH 50: CPT | Mod: GC

## 2023-12-14 PROCEDURE — 99232 SBSQ HOSP IP/OBS MODERATE 35: CPT

## 2023-12-14 RX ORDER — SODIUM CHLORIDE 9 MG/ML
1000 INJECTION, SOLUTION INTRAVENOUS
Refills: 0 | Status: DISCONTINUED | OUTPATIENT
Start: 2023-12-14 | End: 2023-12-17

## 2023-12-14 RX ORDER — MAGNESIUM SULFATE 500 MG/ML
2 VIAL (ML) INJECTION ONCE
Refills: 0 | Status: COMPLETED | OUTPATIENT
Start: 2023-12-14 | End: 2023-12-14

## 2023-12-14 RX ORDER — MULTIVIT-MIN/FERROUS GLUCONATE 9 MG/15 ML
1 LIQUID (ML) ORAL DAILY
Refills: 0 | Status: DISCONTINUED | OUTPATIENT
Start: 2023-12-14 | End: 2023-12-14

## 2023-12-14 RX ADMIN — SODIUM CHLORIDE 75 MILLILITER(S): 9 INJECTION, SOLUTION INTRAVENOUS at 11:21

## 2023-12-14 RX ADMIN — Medication 25 GRAM(S): at 11:23

## 2023-12-14 RX ADMIN — Medication 100 MILLIGRAM(S): at 11:23

## 2023-12-14 RX ADMIN — Medication 100 MILLIGRAM(S): at 17:30

## 2023-12-14 RX ADMIN — Medication 1 MILLIGRAM(S): at 11:22

## 2023-12-14 RX ADMIN — Medication 100 MILLIGRAM(S): at 06:22

## 2023-12-14 RX ADMIN — Medication 1 TABLET(S): at 11:23

## 2023-12-14 RX ADMIN — PANTOPRAZOLE SODIUM 40 MILLIGRAM(S): 20 TABLET, DELAYED RELEASE ORAL at 06:22

## 2023-12-14 RX ADMIN — ATORVASTATIN CALCIUM 10 MILLIGRAM(S): 80 TABLET, FILM COATED ORAL at 21:43

## 2023-12-14 RX ADMIN — SENNA PLUS 2 TABLET(S): 8.6 TABLET ORAL at 21:43

## 2023-12-14 RX ADMIN — FINASTERIDE 5 MILLIGRAM(S): 5 TABLET, FILM COATED ORAL at 11:23

## 2023-12-14 RX ADMIN — DONEPEZIL HYDROCHLORIDE 5 MILLIGRAM(S): 10 TABLET, FILM COATED ORAL at 21:43

## 2023-12-14 RX ADMIN — POLYETHYLENE GLYCOL 3350 17 GRAM(S): 17 POWDER, FOR SOLUTION ORAL at 11:23

## 2023-12-14 RX ADMIN — TAMSULOSIN HYDROCHLORIDE 0.4 MILLIGRAM(S): 0.4 CAPSULE ORAL at 21:43

## 2023-12-14 RX ADMIN — AMLODIPINE BESYLATE 5 MILLIGRAM(S): 2.5 TABLET ORAL at 06:22

## 2023-12-14 NOTE — CHART NOTE - NSCHARTNOTEFT_GEN_A_CORE
Hematology consulted for leukopenia and thrombocytopenia. Suspect most likely 2/2 infectious etiology. ID following and recommending course of doxycycline. Patient improving on course of treatment. No further hematologic w/u planned inpatient at this time. Hematology to sign-off. Patient can f/u with his PCP upon discharge and have repeat labs in 2-3 weeks post treatment course and if any abnormalities persist can be referred to a hematologist as an outpatient.  Please call or re-consult for any questions or concerns.       Rest of care per primary team.     Kunal Hooks M.D.  H/O PGY-4

## 2023-12-14 NOTE — CHART NOTE - NSCHARTNOTEFT_GEN_A_CORE
(1) attempt(s) were made to reach patient, which have been unsuccessful. Unable to leave voicemail on 12/14 Lab called the back line in [facility] needing lab orders, as the patient was down in the lab, as she was told to get labs before her appointment. Orders for lab had  19. Extended orders and placed routine order for CBC as well. Marium Pennington LPN 2020 9:14 AM

## 2023-12-14 NOTE — PROGRESS NOTE ADULT - PROBLEM SELECTOR PLAN 2
creatinine on admission was 3.5 ->2.8 after 1L ivf  - c/w maintenance fluids 75cc/hr and continue to monitor  - FeNa consistent with pre-renal rigo   - abdominal u/s suggests ?chronic kidney disease 12/11  - urine studies suggest prerenal disease creatinine on admission was 3.5 ->2.8 after 1L ivf  - c/w maintenance fluids 75cc/hr and continue to monitor  - FeNa consistent with pre-renal rigo   - abdominal u/s suggests ?chronic kidney disease 12/11  - urine studies suggest prerenal disease  improving renal function

## 2023-12-14 NOTE — PROGRESS NOTE ADULT - ATTENDING COMMENTS
73-year-old male with a history of hypertension hyperlipidemia, DM, EtOH use disorder, Dementia presented with generalized weakness , subjective fevers and decreased PO intake since he returned for the [place] on December 1st, Patient went to a hospital in the [place] for 1 episode of nausea and vomiting. Patient was subsequently released from the hospital and upon returning to the [place] he went to [facility] for weakness and a fall. At that hospital he had a negative traumatic workup and was found to have acute kidney injury, He received IV fluids and his kidney function resolved.  Patient had renal ultrasounds which were unremarkable. Patient was diagnosed with transaminitis and pancytopenia likely secondary to alcohol.  On arrival, Temp 99.7, HR 86, /66, RR 16, SpO2 97% on room air. Labs remarkable for WBC 1.61, plt 49, PTT 39.8, , mg 1.3, BUN 44, creatinine 3.5, glucose 137, , . Chest xray showed clear lungs. In the ED, patient was given 1x dose of doxycycline and 1L of IVF and admitted for further evaluation and treatment   # Fever, Bicytopenia/ SIRS in a traveler has a very broad Diff Diagnosis in the [place] with reported Dengue outbreak / Pt is hemodynamically stable    -pt was started on DOxy was DC and restarted due to pos IGM for RMSF --> noted to be IGM ) ? cross reactivity --> might need to repeat labs   -  cont to monitor   -IMproved platelet   Transfuse Plt if <10K or <15K with fever and <50 K if bleeding    PT  recommends DARRIAN   Hem rec is appreciated   son was updated on the phone     Lexis Johnson   Hospitalist   available on TEAMS 73-year-old male with a history of hypertension hyperlipidemia, DM, EtOH use disorder, Dementia presented with generalized weakness , subjective fevers and decreased PO intake since he returned for the [place] on December 1st, Patient went to a hospital in the [place] for 1 episode of nausea and vomiting. Patient was subsequently released from the hospital and upon returning to the [place] he went to [facility] for weakness and a fall. At that hospital he had a negative traumatic workup and was found to have acute kidney injury, He received IV fluids and his kidney function resolved.  Patient had renal ultrasounds which were unremarkable. Patient was diagnosed with transaminitis and pancytopenia likely secondary to alcohol.  On arrival, Temp 99.7, HR 86, /66, RR 16, SpO2 97% on room air. Labs remarkable for WBC 1.61, plt 49, PTT 39.8, , mg 1.3, BUN 44, creatinine 3.5, glucose 137, , . Chest xray showed clear lungs. In the ED, patient was given 1x dose of doxycycline and 1L of IVF and admitted for further evaluation and treatment   # Fever, Bicytopenia/ SIRS in a traveler has a very broad Diff Diagnosis in the [place] with reported Dengue outbreak / Pt is hemodynamically stable    -pt was started on DOxy was DC and restarted due to pos IGM for RMSF --> noted to be IGM ) ? cross reactivity --> might need to repeat labs   -  cont to monitor   -IMproved platelet   Transfuse Plt if <10K or <15K with fever and <50 K if bleeding    PT  recommends DARRIAN   Hem rec is appreciated   son was updated on the phone     Lexis Johnson   Hospitalist   available on TEAMS 73-year-old male with a history of hypertension hyperlipidemia, DM, EtOH use disorder, Dementia presented with generalized weakness , subjective fevers and decreased PO intake since he returned for the [place] on December 1st, Patient went to a hospital in the [place] for 1 episode of nausea and vomiting. Patient was subsequently released from the hospital and upon returning to the [facility] he went to [facility] for weakness and a fall. At that hospital he had a negative traumatic workup and was found to have acute kidney injury, He received IV fluids and his kidney function resolved.  Patient had renal ultrasounds which were unremarkable. Patient was diagnosed with transaminitis and pancytopenia likely secondary to alcohol.  On arrival, Temp 99.7, HR 86, /66, RR 16, SpO2 97% on room air. Labs remarkable for WBC 1.61, plt 49, PTT 39.8, , mg 1.3, BUN 44, creatinine 3.5, glucose 137, , . Chest xray showed clear lungs. In the ED, patient was given 1x dose of doxycycline and 1L of IVF and admitted for further evaluation and treatment   # Fever, Bicytopenia/ SIRS in a traveler has a very broad Diff Diagnosis in the [place] with reported Dengue outbreak / Pt is hemodynamically stable    -pt was started on DOxy was DC and restarted due to pos IGM for RMSF -->-> might need to repeat labs as oupt   -  cont to monitor   -IMproved platelet   Transfuse Plt if <10K or <15K with fever and <50 K if bleeding    PT  recommends DARRIAN   Hem rec is appreciated   son was updated on the phone     Lexis Johnson   Hospitalist   available on TEAMS 73-year-old male with a history of hypertension hyperlipidemia, DM, EtOH use disorder, Dementia presented with generalized weakness , subjective fevers and decreased PO intake since he returned for the [place] on December 1st, Patient went to a hospital in the [place] for 1 episode of nausea and vomiting. Patient was subsequently released from the hospital and upon returning to the [facility] he went to [facility] for weakness and a fall. At that hospital he had a negative traumatic workup and was found to have acute kidney injury, He received IV fluids and his kidney function resolved.  Patient had renal ultrasounds which were unremarkable. Patient was diagnosed with transaminitis and pancytopenia likely secondary to alcohol.  On arrival, Temp 99.7, HR 86, /66, RR 16, SpO2 97% on room air. Labs remarkable for WBC 1.61, plt 49, PTT 39.8, , mg 1.3, BUN 44, creatinine 3.5, glucose 137, , . Chest xray showed clear lungs. In the ED, patient was given 1x dose of doxycycline and 1L of IVF and admitted for further evaluation and treatment   # Fever, Bicytopenia/ SIRS in a traveler has a very broad Diff Diagnosis in the [place] with reported Dengue outbreak / Pt is hemodynamically stable    -pt was started on DOxy was DC and restarted due to pos IGM for RMSF -->-> might need to repeat labs as oupt   -  cont to monitor   -IMproved platelet   Transfuse Plt if <10K or <15K with fever and <50 K if bleeding    PT  recommends DARRIAN   Hem rec is appreciated   son was updated on the phone     Lexis Johnson   Hospitalist   available on TEAMS

## 2023-12-14 NOTE — PROGRESS NOTE ADULT - ASSESSMENT
73-year-old man with a history of hypertension hyperlipidemia diabetes EtOH abuse dementia admitted 12/11/23 with chills and malaise. He recently came from  12/1/23 and was hospitalized at [facility] 12/1 --> 12/8/23 with MICHAELA, transaminitis, and thrombocytopenia. He currently is lethargic, withdrawn with leukopenia, thrombocytopenia, renal impairment, transaminitis, hepatic steatosis  12/1/23 BC x2 NEG  12/11 Malaria  PCR  NEG; Hep A Ig M NEG; CMV IgG+; CMV IgM NEG: +EBV consistent with remote infection; NEG LYME< NEG TICK associated PCR panel; Brain CT with area of frontal enceophalomalacia  12/12/23 Hgb A1C = 6.1%;  Hep C NEG; HEP C RNA: None detected; HIV NEG;     I believe Dengue Hemorrhagic fever is most likely underlying etiology  12/12 improved appearance, improved labs  -  could this represent resolving Dengue?  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   gini RA, Hero S, Breonna P, Valencia DO, Glover-Mercado AA, Aguilar-Ferrari B, Ignacia DIANEC, Klein Ferrari I, Hermelinda A, Tim A, Re S, Marco-Tevin A. Ticks and Tick-Borne Diseases in [place] and the [place]: A One [facility] Perspective. Pathogens. 2021 Oct 2;10(10):1273. doi: [id number]. PMID: [id number]; PMCID: [id number].)      Suggest  Continue Doxycycline 12/11 --> 12/12; 12/13 -->    14 day course anticipated through 12/25/23    trend CBC, CMP    Obtain  convalescent RMSF in about a month around January 16, 2024     73-year-old man with a history of hypertension hyperlipidemia diabetes EtOH abuse dementia admitted 12/11/23 with chills and malaise. He recently came from  12/1/23 and was hospitalized at [facility] 12/1 --> 12/8/23 with MICHAELA, transaminitis, and thrombocytopenia. He currently is lethargic, withdrawn with leukopenia, thrombocytopenia, renal impairment, transaminitis, hepatic steatosis  12/1/23 BC x2 NEG  12/11 Malaria  PCR  NEG; Hep A Ig M NEG; CMV IgG+; CMV IgM NEG: +EBV consistent with remote infection; NEG LYME< NEG TICK associated PCR panel; Brain CT with area of frontal enceophalomalacia  12/12/23 Hgb A1C = 6.1%;  Hep C NEG; HEP C RNA: None detected; HIV NEG;     I believe Dengue Hemorrhagic fever is most likely underlying etiology  12/12 improved appearance, improved labs  -  could this represent resolving Dengue?  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   gini RA, Hero S, Breonna P, Valencia DO, Glover-Mercado AA, Aguilar-Ferrari B, Ignacia DIANEC, Klein Ferrari I, Hermelinda A, Tim A, Re S, Marco-Tevin A. Ticks and Tick-Borne Diseases in [place] and the [place]: A One Health Perspective. Pathogens. 2021 Oct 2;10(10):1273. doi: 10.3390/[id number]. PMID: [id number]; PMCID: [id number].)      Suggest  Continue Doxycycline 12/11 --> 12/12; 12/13 -->    14 day course anticipated through 12/25/23    trend CBC, CMP    Obtain  convalescent RMSF in about a month around January 16, 2024

## 2023-12-14 NOTE — PROGRESS NOTE ADULT - SUBJECTIVE AND OBJECTIVE BOX
INTERNAL MEDICINE PROGRESS NOTE  Amandeep Encinas MD  Please contact via TEAMS    Patient is a 73y old  Male who presents with a chief complaint of MICHAELA, elevated LFTs (13 Dec 2023 17:43)      SUBJECTIVE / OVERNIGHT EVENTS::  No acute overnight events. Pt mental status improving somewhat. Still ao1-2. Denies pain, fevers, complaints.    =================Meds=================  MEDICATIONS  (STANDING):  amLODIPine   Tablet 5 milliGRAM(s) Oral daily  atorvastatin 10 milliGRAM(s) Oral at bedtime  chlorhexidine 4% Liquid 1 Application(s) Topical daily  dextrose 5%. 1000 milliLiter(s) (50 mL/Hr) IV Continuous <Continuous>  dextrose 5%. 1000 milliLiter(s) (100 mL/Hr) IV Continuous <Continuous>  dextrose 50% Injectable 25 Gram(s) IV Push once  dextrose 50% Injectable 12.5 Gram(s) IV Push once  dextrose 50% Injectable 25 Gram(s) IV Push once  donepezil 5 milliGRAM(s) Oral at bedtime  doxycycline IVPB 100 milliGRAM(s) IV Intermittent every 12 hours  finasteride 5 milliGRAM(s) Oral daily  folic acid 1 milliGRAM(s) Oral daily  glucagon  Injectable 1 milliGRAM(s) IntraMuscular once  insulin lispro (ADMELOG) corrective regimen sliding scale   SubCutaneous three times a day before meals  lactated ringers. 1000 milliLiter(s) (75 mL/Hr) IV Continuous <Continuous>  multivitamin 1 Tablet(s) Oral daily  pantoprazole    Tablet 40 milliGRAM(s) Oral before breakfast  polyethylene glycol 3350 17 Gram(s) Oral daily  senna 2 Tablet(s) Oral at bedtime  tamsulosin 0.4 milliGRAM(s) Oral at bedtime  thiamine 100 milliGRAM(s) Oral daily    MEDICATIONS  (PRN):  acetaminophen     Tablet .. 650 milliGRAM(s) Oral every 6 hours PRN Temp greater or equal to 38C (100.4F)  dextrose Oral Gel 15 Gram(s) Oral once PRN Blood Glucose LESS THAN 70 milliGRAM(s)/deciliter      I&O's Summary    13 Dec 2023 07:01  -  14 Dec 2023 07:00  --------------------------------------------------------  IN: 125 mL / OUT: 0 mL / NET: 125 mL        =================Vitals=================  Vital Signs Last 24 Hrs  T(C): 37 (14 Dec 2023 04:43), Max: 37 (14 Dec 2023 04:43)  T(F): 98.6 (14 Dec 2023 04:43), Max: 98.6 (14 Dec 2023 04:43)  HR: 76 (14 Dec 2023 04:43) (67 - 79)  BP: 106/83 (14 Dec 2023 04:43) (106/83 - 129/77)  BP(mean): --  RR: 18 (14 Dec 2023 04:43) (18 - 18)  SpO2: 97% (14 Dec 2023 04:43) (97% - 98%)    Parameters below as of 14 Dec 2023 04:43  Patient On (Oxygen Delivery Method): room air        =================PHYSICAL EXAM=================  General: NAD, well groomed, well developed, uncooperative  Head: atraumatic, normocephalic   Eyes: sclera and conjunctiva clear   ENMT; slightly dry mucous membranes  Neck: no JVD, no tender lymphadenopathy   Lungs: clear lung fields bilaterally, no wheezes, rales, or rhonchi  Heart: regular rate and rhythm, normal s1,s2, no murmurs, rubs, or gallop   Extremities: warm, well perfused, no lower extremity edema  Skin: right calf with raised papule with central clearing, non-erythematous   Neuro: AOx1, no focal neurological deficits    ===================LABS=======================                        13.1   2.97  )-----------( 34       ( 13 Dec 2023 07:30 )             38.1     Auto Eosinophil # 0.08  / Auto Eosinophil % 2.7   / Auto Neutrophil # 1.21  / Auto Neutrophil % 40.7  / BANDS % x                            12.0   2.70  )-----------( 26       ( 12 Dec 2023 13:22 )             35.9     Auto Eosinophil # x     / Auto Eosinophil % x     / Auto Neutrophil # x     / Auto Neutrophil % x     / BANDS % x        12-13    142  |  105  |  22  ----------------------------<  101<H>  3.7   |  25  |  1.65<H>    Ca    8.7      13 Dec 2023 07:26  Mg     1.3     12-13  Phos  2.8     12-13  TPro  6.5  /  Alb  3.5  /  TBili  0.4  /  DBili  x   /  AST  173<H>  /  ALT  128<H>  /  AlkPhos  37<L>  12-13    PT/INR - ( 14 Dec 2023 07:00 )   PT: 11.0 sec;   INR: 1.05 ratio         PTT - ( 14 Dec 2023 07:00 )  PTT:34.4 sec      Urinalysis Basic - ( 13 Dec 2023 07:26 )    Color: x / Appearance: x / SG: x / pH: x  Gluc: 101 mg/dL / Ketone: x  / Bili: x / Urobili: x   Blood: x / Protein: x / Nitrite: x   Leuk Esterase: x / RBC: x / WBC x   Sq Epi: x / Non Sq Epi: x / Bacteria: x          ===============Radiology & Additional Tests==================         INTERNAL MEDICINE PROGRESS NOTE  Amandeep Encinas MD  Please contact via TEAMS    Patient is a 73y old  Male who presents with a chief complaint of MICHAELA, elevated LFTs (13 Dec 2023 17:43)      SUBJECTIVE / OVERNIGHT EVENTS::  No acute overnight events. Pt mental status improving somewhat. Still ao1-2. Denies pain, fevers, complaints.    =================Meds=================  MEDICATIONS  (STANDING):  amLODIPine   Tablet 5 milliGRAM(s) Oral daily  atorvastatin 10 milliGRAM(s) Oral at bedtime  chlorhexidine 4% Liquid 1 Application(s) Topical daily  dextrose 5%. 1000 milliLiter(s) (50 mL/Hr) IV Continuous <Continuous>  dextrose 5%. 1000 milliLiter(s) (100 mL/Hr) IV Continuous <Continuous>  dextrose 50% Injectable 25 Gram(s) IV Push once  dextrose 50% Injectable 12.5 Gram(s) IV Push once  dextrose 50% Injectable 25 Gram(s) IV Push once  donepezil 5 milliGRAM(s) Oral at bedtime  doxycycline IVPB 100 milliGRAM(s) IV Intermittent every 12 hours  finasteride 5 milliGRAM(s) Oral daily  folic acid 1 milliGRAM(s) Oral daily  glucagon  Injectable 1 milliGRAM(s) IntraMuscular once  insulin lispro (ADMELOG) corrective regimen sliding scale   SubCutaneous three times a day before meals  lactated ringers. 1000 milliLiter(s) (75 mL/Hr) IV Continuous <Continuous>  multivitamin 1 Tablet(s) Oral daily  pantoprazole    Tablet 40 milliGRAM(s) Oral before breakfast  polyethylene glycol 3350 17 Gram(s) Oral daily  senna 2 Tablet(s) Oral at bedtime  tamsulosin 0.4 milliGRAM(s) Oral at bedtime  thiamine 100 milliGRAM(s) Oral daily    MEDICATIONS  (PRN):  acetaminophen     Tablet .. 650 milliGRAM(s) Oral every 6 hours PRN Temp greater or equal to 38C (100.4F)  dextrose Oral Gel 15 Gram(s) Oral once PRN Blood Glucose LESS THAN 70 milliGRAM(s)/deciliter      I&O's Summary    13 Dec 2023 07:01  -  14 Dec 2023 07:00  --------------------------------------------------------  IN: 125 mL / OUT: 0 mL / NET: 125 mL        =================Vitals=================  Vital Signs Last 24 Hrs  T(C): 37 (14 Dec 2023 04:43), Max: 37 (14 Dec 2023 04:43)  T(F): 98.6 (14 Dec 2023 04:43), Max: 98.6 (14 Dec 2023 04:43)  HR: 76 (14 Dec 2023 04:43) (67 - 79)  BP: 106/83 (14 Dec 2023 04:43) (106/83 - 129/77)  BP(mean): --  RR: 18 (14 Dec 2023 04:43) (18 - 18)  SpO2: 97% (14 Dec 2023 04:43) (97% - 98%)    Parameters below as of 14 Dec 2023 04:43  Patient On (Oxygen Delivery Method): room air        =================PHYSICAL EXAM=================  General: NAD, well groomed, well developed, uncooperative  Head: atraumatic, normocephalic   Eyes: sclera and conjunctiva clear   ENMT; slightly dry mucous membranes  Neck: no JVD, no tender lymphadenopathy   Lungs: clear lung fields bilaterally, no wheezes, rales, or rhonchi  Heart: regular rate and rhythm, normal s1,s2, no murmurs, rubs, or gallop   Extremities: warm, well perfused, no lower extremity edema  Skin: right calf with raised papule with central clearing, non-erythematous that is improving   Neuro: AOx1, no focal neurological deficits    ===================LABS=======================                        13.1   2.97  )-----------( 34       ( 13 Dec 2023 07:30 )             38.1     Auto Eosinophil # 0.08  / Auto Eosinophil % 2.7   / Auto Neutrophil # 1.21  / Auto Neutrophil % 40.7  / BANDS % x                            12.0   2.70  )-----------( 26       ( 12 Dec 2023 13:22 )             35.9     Auto Eosinophil # x     / Auto Eosinophil % x     / Auto Neutrophil # x     / Auto Neutrophil % x     / BANDS % x        12-13    142  |  105  |  22  ----------------------------<  101<H>  3.7   |  25  |  1.65<H>    Ca    8.7      13 Dec 2023 07:26  Mg     1.3     12-13  Phos  2.8     12-13  TPro  6.5  /  Alb  3.5  /  TBili  0.4  /  DBili  x   /  AST  173<H>  /  ALT  128<H>  /  AlkPhos  37<L>  12-13    PT/INR - ( 14 Dec 2023 07:00 )   PT: 11.0 sec;   INR: 1.05 ratio         PTT - ( 14 Dec 2023 07:00 )  PTT:34.4 sec      Urinalysis Basic - ( 13 Dec 2023 07:26 )    Color: x / Appearance: x / SG: x / pH: x  Gluc: 101 mg/dL / Ketone: x  / Bili: x / Urobili: x   Blood: x / Protein: x / Nitrite: x   Leuk Esterase: x / RBC: x / WBC x   Sq Epi: x / Non Sq Epi: x / Bacteria: x          ===============Radiology & Additional Tests==================

## 2023-12-14 NOTE — PROGRESS NOTE ADULT - PROBLEM SELECTOR PLAN 1
- Fever, unclear etiology, but possibly related to Tick borne illness given duration of symptoms, along with central clearing rash behind right calf, and leukopenia + thrombocytopenia   - EKG shows NSR  >> RMSF IGM positive (12/12)  - continue doxycyline (14 day course expected per ID)  - EBV (-), CMV (-)  - ID consulted, appreciate recs  - peripheral smear pending  - EBV negative, CMV negative  - dengue pending  - blood culture prelim negative  - CT head negative for acute pathology  - quant gold pending, HIV pending  - Neuro consulted, appreciate recs  - heme consulted, appreciate recs

## 2023-12-14 NOTE — PROGRESS NOTE ADULT - PROBLEM SELECTOR PLAN 3
son reports patient drink 1/2 bottle or more of hard liquor daily, last drink November 28  - has previously tried to quit  - never had withdrawals, outside window for CIWA   - SBIRT consult   - daily folic acid, thiamine   - abdominal ultrasound shows diffuse hepatic steatosis  - f/u hepatitis panel i/s/o transaminitis son reports patient drink 1/2 bottle or more of hard liquor daily, last drink November 28  - has previously tried to quit  - never had withdrawals, outside window for CIWA   - SBIRT was consult   - daily folic acid, thiamine   - abdominal ultrasound shows diffuse hepatic steatosis

## 2023-12-14 NOTE — PROGRESS NOTE ADULT - SUBJECTIVE AND OBJECTIVE BOX
Follow Up:  RMSF    Interval History/ROS:  improved appearance, eating better    Allergies  No Known Allergies        ANTIMICROBIALS:  doxycycline monohydrate Capsule 100 every 12 hours      OTHER MEDS:  MEDICATIONS  (STANDING):  acetaminophen     Tablet .. 650 every 6 hours PRN  amLODIPine   Tablet 5 daily  atorvastatin 10 at bedtime  dextrose 50% Injectable 25 once  dextrose 50% Injectable 12.5 once  dextrose 50% Injectable 25 once  dextrose Oral Gel 15 once PRN  donepezil 5 at bedtime  finasteride 5 daily  glucagon  Injectable 1 once  insulin lispro (ADMELOG) corrective regimen sliding scale  three times a day before meals  pantoprazole    Tablet 40 before breakfast  polyethylene glycol 3350 17 daily  senna 2 at bedtime  tamsulosin 0.4 at bedtime      Vital Signs Last 24 Hrs  T(C): 36.7 (14 Dec 2023 10:00), Max: 37 (14 Dec 2023 04:43)  T(F): 98.1 (14 Dec 2023 10:00), Max: 98.6 (14 Dec 2023 04:43)  HR: 69 (14 Dec 2023 10:00) (67 - 76)  BP: 131/69 (14 Dec 2023 10:00) (106/83 - 131/69)  BP(mean): --  RR: 18 (14 Dec 2023 10:00) (18 - 18)  SpO2: 98% (14 Dec 2023 10:00) (97% - 98%)    Parameters below as of 14 Dec 2023 10:00  Patient On (Oxygen Delivery Method): room air        PHYSICAL EXAM:  General: NAD, Non-toxic  Neurology: A&Ox3, nonfocal  Respiratory: Clear to auscultation bilaterally  CV: RRR, S1S2, no murmurs, rubs or gallops  Abdominal: Soft, Non-tender, non-distended, normal bowel sounds  Extremities: No edema  Line Sites: Clear  Skin: No rash                          12.6   3.32  )-----------( 53       ( 14 Dec 2023 06:57 )             37.8       12-14    142  |  105  |  19  ----------------------------<  98  3.7   |  26  |  1.58<H>    Ca    8.7      14 Dec 2023 06:57  Phos  2.7     12-14  Mg     1.4     12-14    TPro  6.4  /  Alb  3.2<L>  /  TBili  0.5  /  DBili  x   /  AST  149<H>  /  ALT  110<H>  /  AlkPhos  39<L>  12-14      Urinalysis Basic - ( 14 Dec 2023 06:57 )    Color: x / Appearance: x / SG: x / pH: x  Gluc: 98 mg/dL / Ketone: x  / Bili: x / Urobili: x   Blood: x / Protein: x / Nitrite: x   Leuk Esterase: x / RBC: x / WBC x   Sq Epi: x / Non Sq Epi: x / Bacteria: x        MICROBIOLOGY:  .Blood Blood  12-11-23   No Blood Parasites observed by giemsa stain  One negative set of blood smears does not rule out  the possibility of a parasitic infection.  A minimum of 3  specimens should be collected, at least 12-24 hours apart,  over a 36 hour time period.  ************************************************************  NEGATIVE for Plasmodium antigens. Microscopy is performed for  confirmation.  The Malaria Rapid antigen test does not detect the  presence of Babesia species. If Babesiosis is suspected  please order test Babesia PCR: Babesia microti PCR Bld  ************************************************************  --  --      Clean Catch Clean Catch (Midstream)  12-11-23   No growth  --  --      .Blood Blood  12-10-23   No growth at 72 Hours  --  --      .Blood Blood  12-10-23   No growth at 72 Hours  --  --      CMV IgG Antibody: 3.50 U/mL (12-11-23 @ 03:12)    CMVPCR Log: NotDetec [id number]/mL (12-11 @ 03:12)        RADIOLOGY:    Umberto Moreno MD; Division of Infectious Disease; Pager: [phone]; nights and weekends: [phone]   Follow Up:  RMSF    Interval History/ROS:  improved appearance, eating better    Allergies  No Known Allergies        ANTIMICROBIALS:  doxycycline monohydrate Capsule 100 every 12 hours      OTHER MEDS:  MEDICATIONS  (STANDING):  acetaminophen     Tablet .. 650 every 6 hours PRN  amLODIPine   Tablet 5 daily  atorvastatin 10 at bedtime  dextrose 50% Injectable 25 once  dextrose 50% Injectable 12.5 once  dextrose 50% Injectable 25 once  dextrose Oral Gel 15 once PRN  donepezil 5 at bedtime  finasteride 5 daily  glucagon  Injectable 1 once  insulin lispro (ADMELOG) corrective regimen sliding scale  three times a day before meals  pantoprazole    Tablet 40 before breakfast  polyethylene glycol 3350 17 daily  senna 2 at bedtime  tamsulosin 0.4 at bedtime      Vital Signs Last 24 Hrs  T(C): 36.7 (14 Dec 2023 10:00), Max: 37 (14 Dec 2023 04:43)  T(F): 98.1 (14 Dec 2023 10:00), Max: 98.6 (14 Dec 2023 04:43)  HR: 69 (14 Dec 2023 10:00) (67 - 76)  BP: 131/69 (14 Dec 2023 10:00) (106/83 - 131/69)  BP(mean): --  RR: 18 (14 Dec 2023 10:00) (18 - 18)  SpO2: 98% (14 Dec 2023 10:00) (97% - 98%)    Parameters below as of 14 Dec 2023 10:00  Patient On (Oxygen Delivery Method): room air        PHYSICAL EXAM:  General: NAD, Non-toxic  Neurology: A&Ox3, nonfocal  Respiratory: Clear to auscultation bilaterally  CV: RRR, S1S2, no murmurs, rubs or gallops  Abdominal: Soft, Non-tender, non-distended, normal bowel sounds  Extremities: No edema  Line Sites: Clear  Skin: No rash                          12.6   3.32  )-----------( 53       ( 14 Dec 2023 06:57 )             37.8       12-14    142  |  105  |  19  ----------------------------<  98  3.7   |  26  |  1.58<H>    Ca    8.7      14 Dec 2023 06:57  Phos  2.7     12-14  Mg     1.4     12-14    TPro  6.4  /  Alb  3.2<L>  /  TBili  0.5  /  DBili  x   /  AST  149<H>  /  ALT  110<H>  /  AlkPhos  39<L>  12-14      Urinalysis Basic - ( 14 Dec 2023 06:57 )    Color: x / Appearance: x / SG: x / pH: x  Gluc: 98 mg/dL / Ketone: x  / Bili: x / Urobili: x   Blood: x / Protein: x / Nitrite: x   Leuk Esterase: x / RBC: x / WBC x   Sq Epi: x / Non Sq Epi: x / Bacteria: x        MICROBIOLOGY:  .Blood Blood  12-11-23   No Blood Parasites observed by giemsa stain  One negative set of blood smears does not rule out  the possibility of a parasitic infection.  A minimum of 3  specimens should be collected, at least 12-24 hours apart,  over a 36 hour time period.  ************************************************************  NEGATIVE for Plasmodium antigens. Microscopy is performed for  confirmation.  The Malaria Rapid antigen test does not detect the  presence of Babesia species. If Babesiosis is suspected  please order test Babesia PCR: Babesia microti PCR Bld  ************************************************************  --  --      Clean Catch Clean Catch (Midstream)  12-11-23   No growth  --  --      .Blood Blood  12-10-23   No growth at 72 Hours  --  --      .Blood Blood  12-10-23   No growth at 72 Hours  --  --      CMV IgG Antibody: 3.50 U/mL (12-11-23 @ 03:12)    CMVPCR Log: NotDetec [id number]/mL (12-11 @ 03:12)        RADIOLOGY:    Umberto Moreno MD; Division of Infectious Disease; Pager: [phone]; nights and weekends: [phone]

## 2023-12-15 LAB
ALBUMIN SERPL ELPH-MCNC: 3.3 G/DL — SIGNIFICANT CHANGE UP (ref 3.3–5)
ALBUMIN SERPL ELPH-MCNC: 3.3 G/DL — SIGNIFICANT CHANGE UP (ref 3.3–5)
ALP SERPL-CCNC: 39 U/L — LOW (ref 40–120)
ALP SERPL-CCNC: 39 U/L — LOW (ref 40–120)
ALT FLD-CCNC: 89 U/L — HIGH (ref 10–45)
ALT FLD-CCNC: 89 U/L — HIGH (ref 10–45)
ANION GAP SERPL CALC-SCNC: 7 MMOL/L — SIGNIFICANT CHANGE UP (ref 5–17)
ANION GAP SERPL CALC-SCNC: 7 MMOL/L — SIGNIFICANT CHANGE UP (ref 5–17)
AST SERPL-CCNC: 106 U/L — HIGH (ref 10–40)
AST SERPL-CCNC: 106 U/L — HIGH (ref 10–40)
BASOPHILS # BLD AUTO: 0.03 K/UL — SIGNIFICANT CHANGE UP (ref 0–0.2)
BASOPHILS # BLD AUTO: 0.03 K/UL — SIGNIFICANT CHANGE UP (ref 0–0.2)
BASOPHILS NFR BLD AUTO: 0.8 % — SIGNIFICANT CHANGE UP (ref 0–2)
BASOPHILS NFR BLD AUTO: 0.8 % — SIGNIFICANT CHANGE UP (ref 0–2)
BILIRUB SERPL-MCNC: 0.5 MG/DL — SIGNIFICANT CHANGE UP (ref 0.2–1.2)
BILIRUB SERPL-MCNC: 0.5 MG/DL — SIGNIFICANT CHANGE UP (ref 0.2–1.2)
BLASTS # FLD: 0 % — SIGNIFICANT CHANGE UP (ref 0–0)
BLASTS # FLD: 0 % — SIGNIFICANT CHANGE UP (ref 0–0)
BUN SERPL-MCNC: 10 MG/DL — SIGNIFICANT CHANGE UP (ref 7–23)
BUN SERPL-MCNC: 10 MG/DL — SIGNIFICANT CHANGE UP (ref 7–23)
CALCIUM SERPL-MCNC: 8.9 MG/DL — SIGNIFICANT CHANGE UP (ref 8.4–10.5)
CALCIUM SERPL-MCNC: 8.9 MG/DL — SIGNIFICANT CHANGE UP (ref 8.4–10.5)
CHLORIDE SERPL-SCNC: 104 MMOL/L — SIGNIFICANT CHANGE UP (ref 96–108)
CHLORIDE SERPL-SCNC: 104 MMOL/L — SIGNIFICANT CHANGE UP (ref 96–108)
CO2 SERPL-SCNC: 29 MMOL/L — SIGNIFICANT CHANGE UP (ref 22–31)
CO2 SERPL-SCNC: 29 MMOL/L — SIGNIFICANT CHANGE UP (ref 22–31)
CREAT SERPL-MCNC: 1.34 MG/DL — HIGH (ref 0.5–1.3)
CREAT SERPL-MCNC: 1.34 MG/DL — HIGH (ref 0.5–1.3)
EGFR: 56 ML/MIN/1.73M2 — LOW
EGFR: 56 ML/MIN/1.73M2 — LOW
EOSINOPHIL # BLD AUTO: 0.08 K/UL — SIGNIFICANT CHANGE UP (ref 0–0.5)
EOSINOPHIL # BLD AUTO: 0.08 K/UL — SIGNIFICANT CHANGE UP (ref 0–0.5)
EOSINOPHIL NFR BLD AUTO: 2.5 % — SIGNIFICANT CHANGE UP (ref 0–6)
EOSINOPHIL NFR BLD AUTO: 2.5 % — SIGNIFICANT CHANGE UP (ref 0–6)
GLUCOSE BLDC GLUCOMTR-MCNC: 106 MG/DL — HIGH (ref 70–99)
GLUCOSE BLDC GLUCOMTR-MCNC: 106 MG/DL — HIGH (ref 70–99)
GLUCOSE BLDC GLUCOMTR-MCNC: 115 MG/DL — HIGH (ref 70–99)
GLUCOSE BLDC GLUCOMTR-MCNC: 115 MG/DL — HIGH (ref 70–99)
GLUCOSE BLDC GLUCOMTR-MCNC: 147 MG/DL — HIGH (ref 70–99)
GLUCOSE BLDC GLUCOMTR-MCNC: 147 MG/DL — HIGH (ref 70–99)
GLUCOSE SERPL-MCNC: 115 MG/DL — HIGH (ref 70–99)
GLUCOSE SERPL-MCNC: 115 MG/DL — HIGH (ref 70–99)
HCT VFR BLD CALC: 36.7 % — LOW (ref 39–50)
HCT VFR BLD CALC: 36.7 % — LOW (ref 39–50)
HGB BLD-MCNC: 12.4 G/DL — LOW (ref 13–17)
HGB BLD-MCNC: 12.4 G/DL — LOW (ref 13–17)
LYMPHOCYTES # BLD AUTO: 0.98 K/UL — LOW (ref 1–3.3)
LYMPHOCYTES # BLD AUTO: 0.98 K/UL — LOW (ref 1–3.3)
LYMPHOCYTES # BLD AUTO: 29.4 % — SIGNIFICANT CHANGE UP (ref 13–44)
LYMPHOCYTES # BLD AUTO: 29.4 % — SIGNIFICANT CHANGE UP (ref 13–44)
MAGNESIUM SERPL-MCNC: 1.3 MG/DL — LOW (ref 1.6–2.6)
MAGNESIUM SERPL-MCNC: 1.3 MG/DL — LOW (ref 1.6–2.6)
MANUAL SMEAR VERIFICATION: SIGNIFICANT CHANGE UP
MANUAL SMEAR VERIFICATION: SIGNIFICANT CHANGE UP
MCHC RBC-ENTMCNC: 29.5 PG — SIGNIFICANT CHANGE UP (ref 27–34)
MCHC RBC-ENTMCNC: 29.5 PG — SIGNIFICANT CHANGE UP (ref 27–34)
MCHC RBC-ENTMCNC: 33.8 GM/DL — SIGNIFICANT CHANGE UP (ref 32–36)
MCHC RBC-ENTMCNC: 33.8 GM/DL — SIGNIFICANT CHANGE UP (ref 32–36)
MCV RBC AUTO: 87.4 FL — SIGNIFICANT CHANGE UP (ref 80–100)
MCV RBC AUTO: 87.4 FL — SIGNIFICANT CHANGE UP (ref 80–100)
MONOCYTES # BLD AUTO: 0.56 K/UL — SIGNIFICANT CHANGE UP (ref 0–0.9)
MONOCYTES # BLD AUTO: 0.56 K/UL — SIGNIFICANT CHANGE UP (ref 0–0.9)
MONOCYTES NFR BLD AUTO: 16.8 % — HIGH (ref 2–14)
MONOCYTES NFR BLD AUTO: 16.8 % — HIGH (ref 2–14)
NEUTROPHILS # BLD AUTO: 1.57 K/UL — LOW (ref 1.8–7.4)
NEUTROPHILS # BLD AUTO: 1.57 K/UL — LOW (ref 1.8–7.4)
NEUTROPHILS NFR BLD AUTO: 47.1 % — SIGNIFICANT CHANGE UP (ref 43–77)
NEUTROPHILS NFR BLD AUTO: 47.1 % — SIGNIFICANT CHANGE UP (ref 43–77)
PHOSPHATE SERPL-MCNC: 2.6 MG/DL — SIGNIFICANT CHANGE UP (ref 2.5–4.5)
PHOSPHATE SERPL-MCNC: 2.6 MG/DL — SIGNIFICANT CHANGE UP (ref 2.5–4.5)
PLAT MORPH BLD: NORMAL — SIGNIFICANT CHANGE UP
PLAT MORPH BLD: NORMAL — SIGNIFICANT CHANGE UP
PLATELET # BLD AUTO: 73 K/UL — LOW (ref 150–400)
PLATELET # BLD AUTO: 73 K/UL — LOW (ref 150–400)
POTASSIUM SERPL-MCNC: 3.5 MMOL/L — SIGNIFICANT CHANGE UP (ref 3.5–5.3)
POTASSIUM SERPL-MCNC: 3.5 MMOL/L — SIGNIFICANT CHANGE UP (ref 3.5–5.3)
POTASSIUM SERPL-SCNC: 3.5 MMOL/L — SIGNIFICANT CHANGE UP (ref 3.5–5.3)
POTASSIUM SERPL-SCNC: 3.5 MMOL/L — SIGNIFICANT CHANGE UP (ref 3.5–5.3)
PROT SERPL-MCNC: 6.4 G/DL — SIGNIFICANT CHANGE UP (ref 6–8.3)
PROT SERPL-MCNC: 6.4 G/DL — SIGNIFICANT CHANGE UP (ref 6–8.3)
R RICKETTSI AB SER-ACNC: NEGATIVE — SIGNIFICANT CHANGE UP
R RICKETTSI AB SER-ACNC: NEGATIVE — SIGNIFICANT CHANGE UP
R RICKETTSI IGM SER-ACNC: 2.98 INDEX — HIGH (ref 0–0.89)
R RICKETTSI IGM SER-ACNC: 2.98 INDEX — HIGH (ref 0–0.89)
RBC # BLD: 4.2 M/UL — SIGNIFICANT CHANGE UP (ref 4.2–5.8)
RBC # BLD: 4.2 M/UL — SIGNIFICANT CHANGE UP (ref 4.2–5.8)
RBC # FLD: 12.1 % — SIGNIFICANT CHANGE UP (ref 10.3–14.5)
RBC # FLD: 12.1 % — SIGNIFICANT CHANGE UP (ref 10.3–14.5)
RBC BLD AUTO: SIGNIFICANT CHANGE UP
RBC BLD AUTO: SIGNIFICANT CHANGE UP
RICK SF IGG TITR SER IF: NEGATIVE — SIGNIFICANT CHANGE UP
RICK SF IGG TITR SER IF: NEGATIVE — SIGNIFICANT CHANGE UP
RICK SF IGM TITR SER IF: 2.98 INDEX — HIGH (ref 0–0.89)
RICK SF IGM TITR SER IF: 2.98 INDEX — HIGH (ref 0–0.89)
SODIUM SERPL-SCNC: 140 MMOL/L — SIGNIFICANT CHANGE UP (ref 135–145)
SODIUM SERPL-SCNC: 140 MMOL/L — SIGNIFICANT CHANGE UP (ref 135–145)
VARIANT LYMPHS # BLD: 4 % — SIGNIFICANT CHANGE UP (ref 0–6)
VARIANT LYMPHS # BLD: 4 % — SIGNIFICANT CHANGE UP (ref 0–6)
WBC # BLD: 3.33 K/UL — LOW (ref 3.8–10.5)
WBC # BLD: 3.33 K/UL — LOW (ref 3.8–10.5)
WBC # FLD AUTO: 3.33 K/UL — LOW (ref 3.8–10.5)
WBC # FLD AUTO: 3.33 K/UL — LOW (ref 3.8–10.5)

## 2023-12-15 PROCEDURE — 99232 SBSQ HOSP IP/OBS MODERATE 35: CPT | Mod: GC

## 2023-12-15 PROCEDURE — 99232 SBSQ HOSP IP/OBS MODERATE 35: CPT

## 2023-12-15 RX ORDER — ENOXAPARIN SODIUM 100 MG/ML
40 INJECTION SUBCUTANEOUS EVERY 24 HOURS
Refills: 0 | Status: DISCONTINUED | OUTPATIENT
Start: 2023-12-15 | End: 2023-12-15

## 2023-12-15 RX ORDER — MAGNESIUM SULFATE 500 MG/ML
2 VIAL (ML) INJECTION ONCE
Refills: 0 | Status: COMPLETED | OUTPATIENT
Start: 2023-12-15 | End: 2023-12-15

## 2023-12-15 RX ADMIN — DONEPEZIL HYDROCHLORIDE 5 MILLIGRAM(S): 10 TABLET, FILM COATED ORAL at 22:08

## 2023-12-15 RX ADMIN — SODIUM CHLORIDE 75 MILLILITER(S): 9 INJECTION, SOLUTION INTRAVENOUS at 01:04

## 2023-12-15 RX ADMIN — Medication 100 MILLIGRAM(S): at 14:11

## 2023-12-15 RX ADMIN — TAMSULOSIN HYDROCHLORIDE 0.4 MILLIGRAM(S): 0.4 CAPSULE ORAL at 22:09

## 2023-12-15 RX ADMIN — Medication 100 MILLIGRAM(S): at 06:39

## 2023-12-15 RX ADMIN — Medication 25 GRAM(S): at 14:13

## 2023-12-15 RX ADMIN — SENNA PLUS 2 TABLET(S): 8.6 TABLET ORAL at 22:09

## 2023-12-15 RX ADMIN — Medication 1 MILLIGRAM(S): at 14:11

## 2023-12-15 RX ADMIN — FINASTERIDE 5 MILLIGRAM(S): 5 TABLET, FILM COATED ORAL at 14:11

## 2023-12-15 RX ADMIN — AMLODIPINE BESYLATE 5 MILLIGRAM(S): 2.5 TABLET ORAL at 06:39

## 2023-12-15 RX ADMIN — PANTOPRAZOLE SODIUM 40 MILLIGRAM(S): 20 TABLET, DELAYED RELEASE ORAL at 06:39

## 2023-12-15 RX ADMIN — CHLORHEXIDINE GLUCONATE 1 APPLICATION(S): 213 SOLUTION TOPICAL at 14:11

## 2023-12-15 RX ADMIN — ATORVASTATIN CALCIUM 10 MILLIGRAM(S): 80 TABLET, FILM COATED ORAL at 22:09

## 2023-12-15 RX ADMIN — Medication 100 MILLIGRAM(S): at 18:55

## 2023-12-15 RX ADMIN — Medication 1 TABLET(S): at 14:11

## 2023-12-15 NOTE — PROGRESS NOTE ADULT - ATTENDING COMMENTS
73-year-old male with a history of hypertension hyperlipidemia, DM, EtOH use disorder, Dementia presented with generalized weakness , subjective fevers and decreased PO intake since he returned for the [place] on December 1st, Patient went to a hospital in the [place] for 1 episode of nausea and vomiting. Patient was subsequently released from the hospital and upon returning to the [facility] he went to [facility] for weakness and a fall. At that hospital he had a negative traumatic workup and was found to have acute kidney injury, He received IV fluids and his kidney function resolved.  Patient had renal ultrasounds which were unremarkable. Patient was diagnosed with transaminitis and pancytopenia likely secondary to alcohol.  On arrival, Temp 99.7, HR 86, /66, RR 16, SpO2 97% on room air. Labs remarkable for WBC 1.61, plt 49, PTT 39.8, , mg 1.3, BUN 44, creatinine 3.5, glucose 137, , . Chest xray showed clear lungs. In the ED, patient was given 1x dose of doxycycline and 1L of IVF and admitted for further evaluation and treatment   # Fever, Bicytopenia/ SIRS in a traveler has a very broad Diff Diagnosis in the [place] with reported Dengue outbreak / Pt is hemodynamically stable    -pt was started on DOxy was DC and restarted due to pos IGM for RMSF -->-> might need to repeat labs as oupt   -  cont to monitor   -IMproving platelet -> start Prophylactic AC and monitor for bleed   Transfuse Plt if <10K or <15K with fever and <50 K if bleeding    PT  recommends DARRIAN   Hem/ID  rec are appreciated   family is updated by team    rest is as per above/ Discussed with team     Lexis Johnson   Hospitalist   available on TEAMS 73-year-old male with a history of hypertension hyperlipidemia, DM, EtOH use disorder, Dementia presented with generalized weakness , subjective fevers and decreased PO intake since he returned for the [place] on December 1st, Patient went to a hospital in the [place] for 1 episode of nausea and vomiting. Patient was subsequently released from the hospital and upon returning [facility] the [facility] [place] he went to [facility] for weakness and a fall. At that hospital he had a negative traumatic workup and was found to have acute kidney injury, He received IV fluids and his kidney function resolved.  Patient had renal ultrasounds which were unremarkable. Patient was diagnosed with transaminitis and pancytopenia likely secondary to alcohol.  On arrival, Temp 99.7, HR 86, /66, RR 16, SpO2 97% on room air. Labs remarkable for WBC 1.61, plt 49, PTT 39.8, , mg 1.3, BUN 44, creatinine 3.5, glucose 137, , . Chest xray showed clear lungs. In the ED, patient was given 1x dose of doxycycline and 1L of IVF and admitted for further evaluation and treatment   # Fever, Bicytopenia/ SIRS in a traveler has a very broad Diff Diagnosis in the [place] with reported Dengue outbreak / Pt is hemodynamically stable    -pt was started on DOxy was DC and restarted due to pos IGM for RMSF -->-> might need to repeat labs as oupt   -  cont to monitor   -IMproving platelet -> start Prophylactic AC and monitor for bleed   Transfuse Plt if <10K or <15K with fever and <50 K if bleeding    PT  recommends DARRIAN   Hem/ID  rec are appreciated   family is updated by team    rest is as per above/ Discussed with team     Lexis Johnson   Hospitalist   available on TEAMS

## 2023-12-15 NOTE — PROGRESS NOTE ADULT - SUBJECTIVE AND OBJECTIVE BOX
Follow Up:  RMSF    Interval History/ROS:  appears alert  - has spent time in chair,  taking po    Allergies  No Known Allergies    ANTIMICROBIALS:  doxycycline monohydrate Capsule 100 every 12 hours    OTHER MEDS:  MEDICATIONS  (STANDING):  acetaminophen     Tablet .. 650 every 6 hours PRN  amLODIPine   Tablet 5 daily  atorvastatin 10 at bedtime  dextrose 50% Injectable 25 once  dextrose 50% Injectable 25 once  dextrose 50% Injectable 12.5 once  dextrose Oral Gel 15 once PRN  donepezil 5 at bedtime  enoxaparin Injectable 40 every 24 hours  finasteride 5 daily  glucagon  Injectable 1 once  insulin lispro (ADMELOG) corrective regimen sliding scale  three times a day before meals  pantoprazole    Tablet 40 before breakfast  polyethylene glycol 3350 17 daily  senna 2 at bedtime  tamsulosin 0.4 at bedtime      Vital Signs Last 24 Hrs  T(C): 36.6 (15 Dec 2023 14:10), Max: 37.3 (14 Dec 2023 22:16)  T(F): 97.8 (15 Dec 2023 14:10), Max: 99.1 (14 Dec 2023 22:16)  HR: 67 (15 Dec 2023 14:10) (67 - 73)  BP: 142/71 (15 Dec 2023 14:10) (128/68 - 142/71)  BP(mean): --  RR: 18 (15 Dec 2023 14:10) (18 - 18)  SpO2: 95% (15 Dec 2023 14:10) (95% - 98%)    Parameters below as of 15 Dec 2023 14:10  Patient On (Oxygen Delivery Method): room air    PHYSICAL EXAM:  General: WN/WD NAD, Non-toxic  Neurology: A&Ox3, nonfocal  Respiratory: Clear to auscultation bilaterally  CV: RRR, S1S2, no murmurs, rubs or gallops  Abdominal: Soft, Non-tender, non-distended  Extremities: No edema,   Line Sites: Clear  Skin: No rash                        12.4   3.33  )-----------( 73       ( 15 Dec 2023 11:22 )             36.7     12-15    140  |  104  |  10  ----------------------------<  115<H>  3.5   |  29  |  1.34<H>    Ca    8.9      15 Dec 2023 11:21  Phos  2.6     12-15  Mg     1.3     12-15    TPro  6.4  /  Alb  3.3  /  TBili  0.5  /  DBili  x   /  AST  106<H>  /  ALT  89<H>  /  AlkPhos  39<L>  12-15      MICROBIOLOGY:  .Blood Blood  12-11-23   No Blood Parasites observed by giemsa stain  One negative set of blood smears does not rule out  the possibility of a parasitic infection.    ************************************************************  --  --      Clean Catch Clean Catch (Midstream)  12-11-23   No growth  --  --      .Blood Blood  12-10-23   No growth at 4 days  --  --      .Blood Blood  12-10-23   No growth at 4 days  --  --    CMV IgG Antibody: 3.50 U/mL (12-11-23 @ 03:12)    CMVPCR Log: NotDetec Zda13II/mL (12-11 @ 03:12)    (12.11.23 @ 03:12)   [facility] Spotted Fever Antibody IgM: 2.52:  Positive >1.10     RADIOLOGY:  < from: CT Head No Cont (12.11.23 @ 22:10) >  Impression: Encephalomalacia/gliosis of the right frontal lobe, likely   sequela of previous parenchymal hemorrhage.  No acute intracranial abnormality.    < end of copied text >      Umberto Moreno MD; Division of Infectious Disease; Pager: [phone]; nights and weekends: [phone]   Follow Up:  RMSF    Interval History/ROS:  appears alert  - has spent time in chair,  taking po    Allergies  No Known Allergies    ANTIMICROBIALS:  doxycycline monohydrate Capsule 100 every 12 hours    OTHER MEDS:  MEDICATIONS  (STANDING):  acetaminophen     Tablet .. 650 every 6 hours PRN  amLODIPine   Tablet 5 daily  atorvastatin 10 at bedtime  dextrose 50% Injectable 25 once  dextrose 50% Injectable 25 once  dextrose 50% Injectable 12.5 once  dextrose Oral Gel 15 once PRN  donepezil 5 at bedtime  enoxaparin Injectable 40 every 24 hours  finasteride 5 daily  glucagon  Injectable 1 once  insulin lispro (ADMELOG) corrective regimen sliding scale  three times a day before meals  pantoprazole    Tablet 40 before breakfast  polyethylene glycol 3350 17 daily  senna 2 at bedtime  tamsulosin 0.4 at bedtime      Vital Signs Last 24 Hrs  T(C): 36.6 (15 Dec 2023 14:10), Max: 37.3 (14 Dec 2023 22:16)  T(F): 97.8 (15 Dec 2023 14:10), Max: 99.1 (14 Dec 2023 22:16)  HR: 67 (15 Dec 2023 14:10) (67 - 73)  BP: 142/71 (15 Dec 2023 14:10) (128/68 - 142/71)  BP(mean): --  RR: 18 (15 Dec 2023 14:10) (18 - 18)  SpO2: 95% (15 Dec 2023 14:10) (95% - 98%)    Parameters below as of 15 Dec 2023 14:10  Patient On (Oxygen Delivery Method): room air    PHYSICAL EXAM:  General: WN/WD NAD, Non-toxic  Neurology: A&Ox3, nonfocal  Respiratory: Clear to auscultation bilaterally  CV: RRR, S1S2, no murmurs, rubs or gallops  Abdominal: Soft, Non-tender, non-distended  Extremities: No edema,   Line Sites: Clear  Skin: No rash                        12.4   3.33  )-----------( 73       ( 15 Dec 2023 11:22 )             36.7     12-15    140  |  104  |  10  ----------------------------<  115<H>  3.5   |  29  |  1.34<H>    Ca    8.9      15 Dec 2023 11:21  Phos  2.6     12-15  Mg     1.3     12-15    TPro  6.4  /  Alb  3.3  /  TBili  0.5  /  DBili  x   /  AST  106<H>  /  ALT  89<H>  /  AlkPhos  39<L>  12-15      MICROBIOLOGY:  .Blood Blood  12-11-23   No Blood Parasites observed by giemsa stain  One negative set of blood smears does not rule out  the possibility of a parasitic infection.    ************************************************************  --  --      Clean Catch Clean Catch (Midstream)  12-11-23   No growth  --  --      .Blood Blood  12-10-23   No growth at 4 days  --  --      .Blood Blood  12-10-23   No growth at 4 days  --  --    CMV IgG Antibody: 3.50 U/mL (12-11-23 @ 03:12)    CMVPCR Log: NotDetec [id number]/mL (12-11 @ 03:12)    (12.11.23 @ 03:12)   [facility] Spotted Fever Antibody IgM: 2.52:  Positive >1.10     RADIOLOGY:  < from: CT Head No Cont (12.11.23 @ 22:10) >  Impression: Encephalomalacia/gliosis of the right frontal lobe, likely   sequela of previous parenchymal hemorrhage.  No acute intracranial abnormality.    < end of copied text >      Umberto Moreno MD; Division of Infectious Disease; Pager: [phone]; nights and weekends: [phone]

## 2023-12-15 NOTE — PROGRESS NOTE ADULT - ASSESSMENT
73-year-old man with a history of hypertension hyperlipidemia diabetes EtOH abuse dementia admitted 12/11/23 with chills and malaise. He recently came from  12/1/23 and was hospitalized at [facility] 12/1 --> 12/8/23 with MICHAELA, transaminitis, and thrombocytopenia. He currently is lethargic, withdrawn with leukopenia, thrombocytopenia, renal impairment, transaminitis, hepatic steatosis  12/1/23 BC x2 NEG  12/11 Malaria  PCR  NEG; Hep A Ig M NEG; CMV IgG+; CMV IgM NEG: +EBV consistent with remote infection; NEG LYME< NEG TICK associated PCR panel; Brain CT with area of frontal enceophalomalacia  12/12/23 Hgb A1C = 6.1%;  Hep C NEG; HEP C RNA: None detected; HIV NEG; Quantiferon gold tb NEG    I thought Dengue Hemorrhagic fever is most likely underlying etiology -     Diagnosis = Acute [place] Spotted fever    12/12 improved appearance, improved labs  -   12/13  POSITIVE RMSF IgM  (neg IgG)  12/14 improved appearance, improved labs - PT suggests DARRIAN  12/15 thrombocytopenia  improved: plt = 73K;  Renal function improved Creat = 1.34 transaminitis improved AST/ALT = 106/89    Rickettsia rickettsiae, 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    Brandon RA, Hero S, Breonna P, Erik DO, Glover-Mercado AA, Aguilar-Vega B, Ignacia DIANE, Ernie Ferrari I, Hermelinda A, Tim A, Re S, Marco-Tevin A. Ticks and Tick-Borne Diseases in [place] and the [place]: A One Health Perspective. Pathogens. 2021 Oct 2;10(10):1273. doi: 10.3390/[id number]. PMID: 94309989; PMCID: [id number].)      Suggest  Continue Doxycycline 12/11 --> 12/12; 12/13 -->    14 day course anticipated through 12/25/23    trend CBC, CMP    Obtain  convalescent RMSF in one month around January 16, 2024    Please call ID if needed this weekend     73-year-old man with a history of hypertension hyperlipidemia diabetes EtOH abuse dementia admitted 12/11/23 with chills and malaise. He recently came from  12/1/23 and was hospitalized at [facility] 12/1 --> 12/8/23 with MICHAELA, transaminitis, and thrombocytopenia. He currently is lethargic, withdrawn with leukopenia, thrombocytopenia, renal impairment, transaminitis, hepatic steatosis  12/1/23 BC x2 NEG  12/11 Malaria  PCR  NEG; Hep A Ig M NEG; CMV IgG+; CMV IgM NEG: +EBV consistent with remote infection; NEG LYME< NEG TICK associated PCR panel; Brain CT with area of frontal enceophalomalacia  12/12/23 Hgb A1C = 6.1%;  Hep C NEG; HEP C RNA: None detected; HIV NEG; Quantiferon gold tb NEG    I thought Dengue Hemorrhagic fever is most likely underlying etiology -     Diagnosis = Acute [place] Spotted fever    12/12 improved appearance, improved labs  -   12/13  POSITIVE RMSF IgM  (neg IgG)  12/14 improved appearance, improved labs - PT suggests DARRIAN  12/15 thrombocytopenia  improved: plt = 73K;  Renal function improved Creat = 1.34 transaminitis improved AST/ALT = 106/89    Rickettsia rickettsiae, 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    Brandon RA, Hero S, Breonna P, Erik DO, Glover-Mercado AA, Aguilar-Vega B, Ignacia DIANE, Ernie Ferrari I, Hermelinda A, Tim A, Re S, Marco-Tevin A. Ticks and Tick-Borne Diseases in [place] and [place]: A One Health Perspective. Pathogens. 2021 Oct 2;10(10):1273. doi: 10.3390/[id number]. PMID: [id number]; PMCID: [id number].)      Suggest  Continue Doxycycline 12/11 --> 12/12; 12/13 -->    14 day course anticipated through 12/25/23    trend CBC, CMP    Obtain  convalescent RMSF in one month around January 16, 2024    Please call ID if needed this weekend

## 2023-12-15 NOTE — PROGRESS NOTE ADULT - PROBLEM SELECTOR PLAN 3
son reports patient drink 1/2 bottle or more of hard liquor daily, last drink November 28  - has previously tried to quit  - never had withdrawals, outside window for CIWA   - SBIRT was consult   - daily folic acid, thiamine   - abdominal ultrasound shows diffuse hepatic steatosis

## 2023-12-15 NOTE — PROGRESS NOTE ADULT - SUBJECTIVE AND OBJECTIVE BOX
INTERNAL MEDICINE PROGRESS NOTE  Amandeep Encinas MD  Please contact via TEAMS    Patient is a 73y old  Male who presents with a chief complaint of MICHAELA, elevated LFTs (14 Dec 2023 17:59)      SUBJECTIVE / OVERNIGHT EVENTS::  No acute overnight events. Pt seen and examined. Denies fevers, chills, CP, SOB, Abdominal pain, N/V, Constipation, Diarrhea      =================Meds=================  MEDICATIONS  (STANDING):  amLODIPine   Tablet 5 milliGRAM(s) Oral daily  atorvastatin 10 milliGRAM(s) Oral at bedtime  chlorhexidine 4% Liquid 1 Application(s) Topical daily  dextrose 5%. 1000 milliLiter(s) (100 mL/Hr) IV Continuous <Continuous>  dextrose 5%. 1000 milliLiter(s) (50 mL/Hr) IV Continuous <Continuous>  dextrose 50% Injectable 25 Gram(s) IV Push once  dextrose 50% Injectable 12.5 Gram(s) IV Push once  dextrose 50% Injectable 25 Gram(s) IV Push once  donepezil 5 milliGRAM(s) Oral at bedtime  doxycycline monohydrate Capsule 100 milliGRAM(s) Oral every 12 hours  finasteride 5 milliGRAM(s) Oral daily  folic acid 1 milliGRAM(s) Oral daily  glucagon  Injectable 1 milliGRAM(s) IntraMuscular once  insulin lispro (ADMELOG) corrective regimen sliding scale   SubCutaneous three times a day before meals  lactated ringers. 1000 milliLiter(s) (75 mL/Hr) IV Continuous <Continuous>  multivitamin 1 Tablet(s) Oral daily  pantoprazole    Tablet 40 milliGRAM(s) Oral before breakfast  polyethylene glycol 3350 17 Gram(s) Oral daily  senna 2 Tablet(s) Oral at bedtime  tamsulosin 0.4 milliGRAM(s) Oral at bedtime  thiamine 100 milliGRAM(s) Oral daily    MEDICATIONS  (PRN):  acetaminophen     Tablet .. 650 milliGRAM(s) Oral every 6 hours PRN Temp greater or equal to 38C (100.4F)  dextrose Oral Gel 15 Gram(s) Oral once PRN Blood Glucose LESS THAN 70 milliGRAM(s)/deciliter      I&O's Summary    14 Dec 2023 07:01  -  15 Dec 2023 07:00  --------------------------------------------------------  IN: 120 mL / OUT: 301 mL / NET: -181 mL        =================Vitals=================  Vital Signs Last 24 Hrs  T(C): 36.7 (15 Dec 2023 04:07), Max: 37.3 (14 Dec 2023 22:16)  T(F): 98 (15 Dec 2023 04:07), Max: 99.1 (14 Dec 2023 22:16)  HR: 69 (15 Dec 2023 04:07) (69 - 73)  BP: 134/66 (15 Dec 2023 04:07) (128/68 - 134/66)  BP(mean): --  RR: 18 (15 Dec 2023 04:07) (18 - 18)  SpO2: 98% (15 Dec 2023 04:07) (97% - 98%)    Parameters below as of 15 Dec 2023 04:07  Patient On (Oxygen Delivery Method): room air        =================PHYSICAL EXAM=================  GENERAL: Laying comfortably, NAD  EYES: EOMI, PERRL, no scleral icterus  NECK: No JVD  LUNG: Clear to auscultation bilaterally; No wheeze, crackles or rhonci  HEART: Regular rate and rhythm; No murmurs, rubs, or gallops  ABDOMEN: Soft, Nontender, Nondistended  EXTREMITIES:  No LE edema, 2+ Peripheral Pulses, No clubbing, cyanosis, or edema  PSYCH: AAOx2  NEUROLOGY: non-focal, strength 5/5 in all extremities, sensation intact  SKIN: No rashes or lesions      ===================LABS=======================                        12.6   3.32  )-----------( 53       ( 14 Dec 2023 06:57 )             37.8     Auto Eosinophil # 0.12  / Auto Eosinophil % 3.5   / Auto Neutrophil # 1.97  / Auto Neutrophil % 59.3  / BANDS % x                            13.1   2.97  )-----------( 34       ( 13 Dec 2023 07:30 )             38.1     Auto Eosinophil # 0.08  / Auto Eosinophil % 2.7   / Auto Neutrophil # 1.21  / Auto Neutrophil % 40.7  / BANDS % x        12-14    142  |  105  |  19  ----------------------------<  98  3.7   |  26  |  1.58<H>  12-13    142  |  105  |  22  ----------------------------<  101<H>  3.7   |  25  |  1.65<H>    Ca    8.7      14 Dec 2023 06:57  Mg     1.4     12-14  Phos  2.7     12-14  TPro  6.4  /  Alb  3.2<L>  /  TBili  0.5  /  DBili  x   /  AST  149<H>  /  ALT  110<H>  /  AlkPhos  39<L>  12-14  TPro  6.5  /  Alb  3.5  /  TBili  0.4  /  DBili  x   /  AST  173<H>  /  ALT  128<H>  /  AlkPhos  37<L>  12-13    PT/INR - ( 14 Dec 2023 07:00 )   PT: 11.0 sec;   INR: 1.05 ratio         PTT - ( 14 Dec 2023 07:00 )  PTT:34.4 sec      Urinalysis Basic - ( 14 Dec 2023 06:57 )    Color: x / Appearance: x / SG: x / pH: x  Gluc: 98 mg/dL / Ketone: x  / Bili: x / Urobili: x   Blood: x / Protein: x / Nitrite: x   Leuk Esterase: x / RBC: x / WBC x   Sq Epi: x / Non Sq Epi: x / Bacteria: x          ===============Radiology & Additional Tests==================

## 2023-12-15 NOTE — PROGRESS NOTE ADULT - TIME BILLING
Personally evaluating patient , reviewing and ordering labs, discussing with ID and Hem and educating patient and family, discussing with Hem team, and implementing care
Personally evaluating patient , reviewing and ordering labs, discussing with ID and Hem and educating patient and family

## 2023-12-15 NOTE — PROGRESS NOTE ADULT - PROBLEM SELECTOR PLAN 2
creatinine on admission was 3.5 ->2.8 after 1L ivf  - c/w maintenance fluids 75cc/hr and continue to monitor  - FeNa consistent with pre-renal rigo   - abdominal u/s suggests ?chronic kidney disease 12/11  - urine studies suggest prerenal disease  improving renal function

## 2023-12-16 LAB
ALBUMIN SERPL ELPH-MCNC: 3.5 G/DL — SIGNIFICANT CHANGE UP (ref 3.3–5)
ALBUMIN SERPL ELPH-MCNC: 3.5 G/DL — SIGNIFICANT CHANGE UP (ref 3.3–5)
ALP SERPL-CCNC: 38 U/L — LOW (ref 40–120)
ALP SERPL-CCNC: 38 U/L — LOW (ref 40–120)
ALT FLD-CCNC: 79 U/L — HIGH (ref 10–45)
ALT FLD-CCNC: 79 U/L — HIGH (ref 10–45)
ANION GAP SERPL CALC-SCNC: 9 MMOL/L — SIGNIFICANT CHANGE UP (ref 5–17)
ANION GAP SERPL CALC-SCNC: 9 MMOL/L — SIGNIFICANT CHANGE UP (ref 5–17)
AST SERPL-CCNC: 86 U/L — HIGH (ref 10–40)
AST SERPL-CCNC: 86 U/L — HIGH (ref 10–40)
BASOPHILS # BLD AUTO: 0.02 K/UL — SIGNIFICANT CHANGE UP (ref 0–0.2)
BASOPHILS # BLD AUTO: 0.02 K/UL — SIGNIFICANT CHANGE UP (ref 0–0.2)
BASOPHILS NFR BLD AUTO: 0.5 % — SIGNIFICANT CHANGE UP (ref 0–2)
BASOPHILS NFR BLD AUTO: 0.5 % — SIGNIFICANT CHANGE UP (ref 0–2)
BILIRUB SERPL-MCNC: 0.6 MG/DL — SIGNIFICANT CHANGE UP (ref 0.2–1.2)
BILIRUB SERPL-MCNC: 0.6 MG/DL — SIGNIFICANT CHANGE UP (ref 0.2–1.2)
BUN SERPL-MCNC: 8 MG/DL — SIGNIFICANT CHANGE UP (ref 7–23)
BUN SERPL-MCNC: 8 MG/DL — SIGNIFICANT CHANGE UP (ref 7–23)
CALCIUM SERPL-MCNC: 9.2 MG/DL — SIGNIFICANT CHANGE UP (ref 8.4–10.5)
CALCIUM SERPL-MCNC: 9.2 MG/DL — SIGNIFICANT CHANGE UP (ref 8.4–10.5)
CHLORIDE SERPL-SCNC: 105 MMOL/L — SIGNIFICANT CHANGE UP (ref 96–108)
CHLORIDE SERPL-SCNC: 105 MMOL/L — SIGNIFICANT CHANGE UP (ref 96–108)
CO2 SERPL-SCNC: 29 MMOL/L — SIGNIFICANT CHANGE UP (ref 22–31)
CO2 SERPL-SCNC: 29 MMOL/L — SIGNIFICANT CHANGE UP (ref 22–31)
CREAT SERPL-MCNC: 1.34 MG/DL — HIGH (ref 0.5–1.3)
CREAT SERPL-MCNC: 1.34 MG/DL — HIGH (ref 0.5–1.3)
CULTURE RESULTS: SIGNIFICANT CHANGE UP
EGFR: 56 ML/MIN/1.73M2 — LOW
EGFR: 56 ML/MIN/1.73M2 — LOW
EOSINOPHIL # BLD AUTO: 0.11 K/UL — SIGNIFICANT CHANGE UP (ref 0–0.5)
EOSINOPHIL # BLD AUTO: 0.11 K/UL — SIGNIFICANT CHANGE UP (ref 0–0.5)
EOSINOPHIL NFR BLD AUTO: 2.7 % — SIGNIFICANT CHANGE UP (ref 0–6)
EOSINOPHIL NFR BLD AUTO: 2.7 % — SIGNIFICANT CHANGE UP (ref 0–6)
GLUCOSE BLDC GLUCOMTR-MCNC: 117 MG/DL — HIGH (ref 70–99)
GLUCOSE BLDC GLUCOMTR-MCNC: 117 MG/DL — HIGH (ref 70–99)
GLUCOSE BLDC GLUCOMTR-MCNC: 127 MG/DL — HIGH (ref 70–99)
GLUCOSE BLDC GLUCOMTR-MCNC: 127 MG/DL — HIGH (ref 70–99)
GLUCOSE BLDC GLUCOMTR-MCNC: 134 MG/DL — HIGH (ref 70–99)
GLUCOSE BLDC GLUCOMTR-MCNC: 134 MG/DL — HIGH (ref 70–99)
GLUCOSE SERPL-MCNC: 111 MG/DL — HIGH (ref 70–99)
GLUCOSE SERPL-MCNC: 111 MG/DL — HIGH (ref 70–99)
HCT VFR BLD CALC: 35.7 % — LOW (ref 39–50)
HCT VFR BLD CALC: 35.7 % — LOW (ref 39–50)
HGB BLD-MCNC: 12.2 G/DL — LOW (ref 13–17)
HGB BLD-MCNC: 12.2 G/DL — LOW (ref 13–17)
IMM GRANULOCYTES NFR BLD AUTO: 0.5 % — SIGNIFICANT CHANGE UP (ref 0–0.9)
IMM GRANULOCYTES NFR BLD AUTO: 0.5 % — SIGNIFICANT CHANGE UP (ref 0–0.9)
LYMPHOCYTES # BLD AUTO: 1.32 K/UL — SIGNIFICANT CHANGE UP (ref 1–3.3)
LYMPHOCYTES # BLD AUTO: 1.32 K/UL — SIGNIFICANT CHANGE UP (ref 1–3.3)
LYMPHOCYTES # BLD AUTO: 32.8 % — SIGNIFICANT CHANGE UP (ref 13–44)
LYMPHOCYTES # BLD AUTO: 32.8 % — SIGNIFICANT CHANGE UP (ref 13–44)
MAGNESIUM SERPL-MCNC: 1.4 MG/DL — LOW (ref 1.6–2.6)
MAGNESIUM SERPL-MCNC: 1.4 MG/DL — LOW (ref 1.6–2.6)
MCHC RBC-ENTMCNC: 29.9 PG — SIGNIFICANT CHANGE UP (ref 27–34)
MCHC RBC-ENTMCNC: 29.9 PG — SIGNIFICANT CHANGE UP (ref 27–34)
MCHC RBC-ENTMCNC: 34.2 GM/DL — SIGNIFICANT CHANGE UP (ref 32–36)
MCHC RBC-ENTMCNC: 34.2 GM/DL — SIGNIFICANT CHANGE UP (ref 32–36)
MCV RBC AUTO: 87.5 FL — SIGNIFICANT CHANGE UP (ref 80–100)
MCV RBC AUTO: 87.5 FL — SIGNIFICANT CHANGE UP (ref 80–100)
MONOCYTES # BLD AUTO: 0.71 K/UL — SIGNIFICANT CHANGE UP (ref 0–0.9)
MONOCYTES # BLD AUTO: 0.71 K/UL — SIGNIFICANT CHANGE UP (ref 0–0.9)
MONOCYTES NFR BLD AUTO: 17.7 % — HIGH (ref 2–14)
MONOCYTES NFR BLD AUTO: 17.7 % — HIGH (ref 2–14)
NEUTROPHILS # BLD AUTO: 1.84 K/UL — SIGNIFICANT CHANGE UP (ref 1.8–7.4)
NEUTROPHILS # BLD AUTO: 1.84 K/UL — SIGNIFICANT CHANGE UP (ref 1.8–7.4)
NEUTROPHILS NFR BLD AUTO: 45.8 % — SIGNIFICANT CHANGE UP (ref 43–77)
NEUTROPHILS NFR BLD AUTO: 45.8 % — SIGNIFICANT CHANGE UP (ref 43–77)
NRBC # BLD: 0 /100 WBCS — SIGNIFICANT CHANGE UP (ref 0–0)
NRBC # BLD: 0 /100 WBCS — SIGNIFICANT CHANGE UP (ref 0–0)
PHOSPHATE SERPL-MCNC: 3 MG/DL — SIGNIFICANT CHANGE UP (ref 2.5–4.5)
PHOSPHATE SERPL-MCNC: 3 MG/DL — SIGNIFICANT CHANGE UP (ref 2.5–4.5)
PLATELET # BLD AUTO: 90 K/UL — LOW (ref 150–400)
PLATELET # BLD AUTO: 90 K/UL — LOW (ref 150–400)
POTASSIUM SERPL-MCNC: 3.3 MMOL/L — LOW (ref 3.5–5.3)
POTASSIUM SERPL-MCNC: 3.3 MMOL/L — LOW (ref 3.5–5.3)
POTASSIUM SERPL-SCNC: 3.3 MMOL/L — LOW (ref 3.5–5.3)
POTASSIUM SERPL-SCNC: 3.3 MMOL/L — LOW (ref 3.5–5.3)
PROT SERPL-MCNC: 6.6 G/DL — SIGNIFICANT CHANGE UP (ref 6–8.3)
PROT SERPL-MCNC: 6.6 G/DL — SIGNIFICANT CHANGE UP (ref 6–8.3)
RBC # BLD: 4.08 M/UL — LOW (ref 4.2–5.8)
RBC # BLD: 4.08 M/UL — LOW (ref 4.2–5.8)
RBC # FLD: 12.1 % — SIGNIFICANT CHANGE UP (ref 10.3–14.5)
RBC # FLD: 12.1 % — SIGNIFICANT CHANGE UP (ref 10.3–14.5)
SODIUM SERPL-SCNC: 143 MMOL/L — SIGNIFICANT CHANGE UP (ref 135–145)
SODIUM SERPL-SCNC: 143 MMOL/L — SIGNIFICANT CHANGE UP (ref 135–145)
SPECIMEN SOURCE: SIGNIFICANT CHANGE UP
WBC # BLD: 4.02 K/UL — SIGNIFICANT CHANGE UP (ref 3.8–10.5)
WBC # BLD: 4.02 K/UL — SIGNIFICANT CHANGE UP (ref 3.8–10.5)
WBC # FLD AUTO: 4.02 K/UL — SIGNIFICANT CHANGE UP (ref 3.8–10.5)
WBC # FLD AUTO: 4.02 K/UL — SIGNIFICANT CHANGE UP (ref 3.8–10.5)

## 2023-12-16 PROCEDURE — 99232 SBSQ HOSP IP/OBS MODERATE 35: CPT | Mod: GC

## 2023-12-16 RX ORDER — HEPARIN SODIUM 5000 [USP'U]/ML
5000 INJECTION INTRAVENOUS; SUBCUTANEOUS EVERY 8 HOURS
Refills: 0 | Status: DISCONTINUED | OUTPATIENT
Start: 2023-12-16 | End: 2023-12-17

## 2023-12-16 RX ORDER — MAGNESIUM SULFATE 500 MG/ML
2 VIAL (ML) INJECTION
Refills: 0 | Status: COMPLETED | OUTPATIENT
Start: 2023-12-16 | End: 2023-12-16

## 2023-12-16 RX ORDER — POTASSIUM CHLORIDE 20 MEQ
20 PACKET (EA) ORAL ONCE
Refills: 0 | Status: COMPLETED | OUTPATIENT
Start: 2023-12-16 | End: 2023-12-16

## 2023-12-16 RX ADMIN — SENNA PLUS 2 TABLET(S): 8.6 TABLET ORAL at 21:42

## 2023-12-16 RX ADMIN — PANTOPRAZOLE SODIUM 40 MILLIGRAM(S): 20 TABLET, DELAYED RELEASE ORAL at 06:22

## 2023-12-16 RX ADMIN — Medication 100 MILLIGRAM(S): at 06:17

## 2023-12-16 RX ADMIN — TAMSULOSIN HYDROCHLORIDE 0.4 MILLIGRAM(S): 0.4 CAPSULE ORAL at 21:42

## 2023-12-16 RX ADMIN — Medication 1 MILLIGRAM(S): at 13:37

## 2023-12-16 RX ADMIN — Medication 1 TABLET(S): at 13:37

## 2023-12-16 RX ADMIN — Medication 20 MILLIEQUIVALENT(S): at 08:33

## 2023-12-16 RX ADMIN — FINASTERIDE 5 MILLIGRAM(S): 5 TABLET, FILM COATED ORAL at 13:37

## 2023-12-16 RX ADMIN — Medication 25 GRAM(S): at 08:33

## 2023-12-16 RX ADMIN — Medication 25 GRAM(S): at 10:58

## 2023-12-16 RX ADMIN — CHLORHEXIDINE GLUCONATE 1 APPLICATION(S): 213 SOLUTION TOPICAL at 13:38

## 2023-12-16 RX ADMIN — HEPARIN SODIUM 5000 UNIT(S): 5000 INJECTION INTRAVENOUS; SUBCUTANEOUS at 21:43

## 2023-12-16 RX ADMIN — Medication 100 MILLIGRAM(S): at 18:40

## 2023-12-16 RX ADMIN — Medication 100 MILLIGRAM(S): at 13:36

## 2023-12-16 RX ADMIN — HEPARIN SODIUM 5000 UNIT(S): 5000 INJECTION INTRAVENOUS; SUBCUTANEOUS at 13:37

## 2023-12-16 RX ADMIN — ATORVASTATIN CALCIUM 10 MILLIGRAM(S): 80 TABLET, FILM COATED ORAL at 21:41

## 2023-12-16 RX ADMIN — DONEPEZIL HYDROCHLORIDE 5 MILLIGRAM(S): 10 TABLET, FILM COATED ORAL at 21:41

## 2023-12-16 RX ADMIN — AMLODIPINE BESYLATE 5 MILLIGRAM(S): 2.5 TABLET ORAL at 06:17

## 2023-12-16 NOTE — PROGRESS NOTE ADULT - SUBJECTIVE AND OBJECTIVE BOX
INTERNAL MEDICINE PROGRESS NOTE  Amandeep Encinas MD  Please contact via TEAMS    Patient is a 73y old  Male who presents with a chief complaint of MICHAELA, elevated LFTs (15 Dec 2023 15:01)      SUBJECTIVE / OVERNIGHT EVENTS::  No acute overnight events. Pt seen and examined. Denies fevers, chills, CP, SOB, Abdominal pain, N/V, Constipation, Diarrhea      =================Meds=================  MEDICATIONS  (STANDING):  amLODIPine   Tablet 5 milliGRAM(s) Oral daily  atorvastatin 10 milliGRAM(s) Oral at bedtime  chlorhexidine 4% Liquid 1 Application(s) Topical daily  dextrose 5%. 1000 milliLiter(s) (50 mL/Hr) IV Continuous <Continuous>  dextrose 5%. 1000 milliLiter(s) (100 mL/Hr) IV Continuous <Continuous>  dextrose 50% Injectable 25 Gram(s) IV Push once  dextrose 50% Injectable 12.5 Gram(s) IV Push once  dextrose 50% Injectable 25 Gram(s) IV Push once  donepezil 5 milliGRAM(s) Oral at bedtime  doxycycline monohydrate Capsule 100 milliGRAM(s) Oral every 12 hours  finasteride 5 milliGRAM(s) Oral daily  folic acid 1 milliGRAM(s) Oral daily  glucagon  Injectable 1 milliGRAM(s) IntraMuscular once  insulin lispro (ADMELOG) corrective regimen sliding scale   SubCutaneous three times a day before meals  lactated ringers. 1000 milliLiter(s) (75 mL/Hr) IV Continuous <Continuous>  multivitamin 1 Tablet(s) Oral daily  pantoprazole    Tablet 40 milliGRAM(s) Oral before breakfast  polyethylene glycol 3350 17 Gram(s) Oral daily  senna 2 Tablet(s) Oral at bedtime  tamsulosin 0.4 milliGRAM(s) Oral at bedtime  thiamine 100 milliGRAM(s) Oral daily    MEDICATIONS  (PRN):  acetaminophen     Tablet .. 650 milliGRAM(s) Oral every 6 hours PRN Temp greater or equal to 38C (100.4F)  dextrose Oral Gel 15 Gram(s) Oral once PRN Blood Glucose LESS THAN 70 milliGRAM(s)/deciliter      I&O's Summary    15 Dec 2023 07:01  -  16 Dec 2023 07:00  --------------------------------------------------------  IN: 350 mL / OUT: 1850 mL / NET: -1500 mL        =================Vitals=================  Vital Signs Last 24 Hrs  T(C): 37.2 (16 Dec 2023 04:49), Max: 37.4 (15 Dec 2023 21:54)  T(F): 99 (16 Dec 2023 04:49), Max: 99.4 (15 Dec 2023 21:54)  HR: 73 (16 Dec 2023 04:49) (67 - 77)  BP: 140/72 (16 Dec 2023 04:49) (130/75 - 142/71)  BP(mean): --  RR: 18 (16 Dec 2023 04:49) (18 - 18)  SpO2: 98% (16 Dec 2023 04:49) (95% - 98%)    Parameters below as of 16 Dec 2023 04:49  Patient On (Oxygen Delivery Method): room air        =================PHYSICAL EXAM=================  GENERAL: Laying comfortably, NAD  EYES: EOMI, PERRL, no scleral icterus  NECK: No JVD  LUNG: Clear to auscultation bilaterally; No wheeze, crackles or rhonci  HEART: Regular rate and rhythm; No murmurs, rubs, or gallops  ABDOMEN: Soft, Nontender, Nondistended  EXTREMITIES:  No LE edema, 2+ Peripheral Pulses, No clubbing, cyanosis, or edema  PSYCH: AAOx3  NEUROLOGY: non-focal, strength 5/5 in all extremities, sensation intact  SKIN: No rashes or lesions      ===================LABS=======================                        12.2   4.02  )-----------( 90       ( 16 Dec 2023 07:10 )             35.7     Auto Eosinophil # 0.11  / Auto Eosinophil % 2.7   / Auto Neutrophil # 1.84  / Auto Neutrophil % 45.8  / BANDS % x                            12.4   3.33  )-----------( 73       ( 15 Dec 2023 11:22 )             36.7     Auto Eosinophil # 0.08  / Auto Eosinophil % 2.5   / Auto Neutrophil # 1.57  / Auto Neutrophil % 47.1  / BANDS % x        12-15    140  |  104  |  10  ----------------------------<  115<H>  3.5   |  29  |  1.34<H>    Ca    8.9      15 Dec 2023 11:21  Mg     1.3     12-15  Phos  2.6     12-15  TPro  6.4  /  Alb  3.3  /  TBili  0.5  /  DBili  x   /  AST  106<H>  /  ALT  89<H>  /  AlkPhos  39<L>  12-15          Urinalysis Basic - ( 15 Dec 2023 11:21 )    Color: x / Appearance: x / SG: x / pH: x  Gluc: 115 mg/dL / Ketone: x  / Bili: x / Urobili: x   Blood: x / Protein: x / Nitrite: x   Leuk Esterase: x / RBC: x / WBC x   Sq Epi: x / Non Sq Epi: x / Bacteria: x          ===============Radiology & Additional Tests==================  Reviewed.

## 2023-12-16 NOTE — PROGRESS NOTE ADULT - ATTENDING COMMENTS
73-year-old male with a history of hypertension hyperlipidemia, DM, EtOH use disorder, Dementia presented with generalized weakness , subjective fevers and decreased PO intake since he returned for the [place] on December 1st, Patient went to a hospital in the [place] for 1 episode of nausea and vomiting. Patient was subsequently released from the hospital and upon returning to the [facility] he went to [facility] for weakness and a fall. At that hospital he had a negative traumatic workup and was found to have acute kidney injury, He received IV fluids and his kidney function resolved.  Patient had renal ultrasounds which were unremarkable. Patient was diagnosed with transaminitis and pancytopenia likely secondary to alcohol.  On arrival, Temp 99.7, HR 86, /66, RR 16, SpO2 97% on room air. Labs remarkable for WBC 1.61, plt 49, PTT 39.8, , mg 1.3, BUN 44, creatinine 3.5, glucose 137, , . Chest xray showed clear lungs. In the ED, patient was given 1x dose of doxycycline and 1L of IVF and admitted for further evaluation and treatment   # Fever, Bicytopenia/ SIRS in a traveler has a very broad Diff Diagnosis in the [place] with reported Dengue outbreak / Pt is hemodynamically stable    -pt was started on DOxy was DC and restarted due to pos IGM for RMSF -->-> might need to repeat labs as oupt   -  cont to monitor   -IMproving platelet -> start Prophylactic AC and monitor for bleed   Transfuse Plt if <10K or <15K with fever and <50 K if bleeding    PT  recommends DARRIAN , but family wants home --> will need PT reeval , might need HOME PT / fall precautions  Hem/ID  rec are appreciated   family is updated by team    rest is as per above/ Discussed with team     Lexis Johnson   Hospitalist   available on TEAMS 73-year-old male with a history of hypertension hyperlipidemia, DM, EtOH use disorder, Dementia presented with generalized weakness , subjective fevers and decreased PO intake since he returned for the [place] on December 1st, Patient went to a hospital in the [place] for 1 episode of nausea and vomiting. Patient was subsequently released from the hospital and upon returning to the [facility] he went to [facility] for weakness and a fall. At that hospital he had a negative traumatic workup and was found to have acute kidney injury, He received IV fluids and his kidney function resolved.  Patient had renal ultrasounds which were unremarkable. Patient was diagnosed with transaminitis and pancytopenia likely secondary to alcohol.  On arrival, Temp 99.7, HR 86, /66, RR 16, SpO2 97% on room air. Labs remarkable for WBC 1.61, plt 49, PTT 39.8, , mg 1.3, BUN 44, creatinine 3.5, glucose 137, , . Chest xray showed clear lungs. In the ED, patient was given 1x dose of doxycycline and 1L of IVF and admitted for further evaluation and treatment   # Fever, Bicytopenia/ SIRS in a traveler has a very broad Diff Diagnosis in the [place] with reported Dengue outbreak / Pt is hemodynamically stable    -pt was started on DOxy was DC and restarted due to pos IGM for RMSF -->-> might need to repeat labs as oupt   -  cont to monitor   -IMproving platelet -> start Prophylactic AC and monitor for bleed   Transfuse Plt if <10K or <15K with fever and <50 K if bleeding    PT  recommends DARRIAN , but family wants home --> will need PT reeval , might need HOME PT / fall precautions  Hem/ID  rec are appreciated   family is updated by team    rest is as per above/ Discussed with team     Lexis Johnson   Hospitalist   available on TEAMS

## 2023-12-16 NOTE — PROVIDER CONTACT NOTE (CRITICAL VALUE NOTIFICATION) - BACKGROUND
due to unspecified condition, HTN, BPH, MICHAELA DMII, SIRS
pt admitted for poor PO intake, fatigue, fever

## 2023-12-16 NOTE — CHART NOTE - NSCHARTNOTEFT_GEN_A_CORE
Patient will require a lightweight wheelchair due to dementia. The beneficiary has a mobility limitation that significantly impairs her ability to participate in one or more MRADLs such as toileting, feeding, dressing, grooming, and bathing in customary locations in the home. The patient’s mobility limitation cannot be sufficiently resolved by the use of an appropriately fitted cane or walker. The patient is unable to ambulated with a walker. Use of a manual wheelchair will significantly improve the beneficiary’s ability to participate in MRADLs and the beneficiary will use it on a regular basis in the home. The beneficiary is able and willing to use the wheelchair in the home. The beneficiary has a caregiver who is available, willing, and able to provide assistance with the wheelchair. The patient is not able to self propel a standard or lightweight wheelchair. Patient will require a transport wheelchair due to dementia. The beneficiary has a mobility limitation that significantly impairs her ability to participate in one or more MRADLs such as toileting, feeding, dressing, grooming, and bathing in customary locations in the home. The patient’s mobility limitation cannot be sufficiently resolved by the use of an appropriately fitted cane or walker. The patient is unable to ambulated with a walker. Use of a manual wheelchair will significantly improve the beneficiary’s ability to participate in MRADLs and the beneficiary will use it on a regular basis in the home. The beneficiary is able and willing to use the wheelchair in the home. The beneficiary has a caregiver who is available, willing, and able to provide assistance with the wheelchair. The patient is not able to self propel a standard or lightweight wheelchair.

## 2023-12-17 ENCOUNTER — TRANSCRIPTION ENCOUNTER (OUTPATIENT)
Age: 73
End: 2023-12-17

## 2023-12-17 VITALS
TEMPERATURE: 99 F | DIASTOLIC BLOOD PRESSURE: 71 MMHG | SYSTOLIC BLOOD PRESSURE: 122 MMHG | OXYGEN SATURATION: 96 % | RESPIRATION RATE: 18 BRPM | HEART RATE: 70 BPM

## 2023-12-17 LAB
ALBUMIN SERPL ELPH-MCNC: 3.2 G/DL — LOW (ref 3.3–5)
ALBUMIN SERPL ELPH-MCNC: 3.2 G/DL — LOW (ref 3.3–5)
ALP SERPL-CCNC: 37 U/L — LOW (ref 40–120)
ALP SERPL-CCNC: 37 U/L — LOW (ref 40–120)
ALT FLD-CCNC: 64 U/L — HIGH (ref 10–45)
ALT FLD-CCNC: 64 U/L — HIGH (ref 10–45)
ANION GAP SERPL CALC-SCNC: 12 MMOL/L — SIGNIFICANT CHANGE UP (ref 5–17)
ANION GAP SERPL CALC-SCNC: 12 MMOL/L — SIGNIFICANT CHANGE UP (ref 5–17)
AST SERPL-CCNC: 63 U/L — HIGH (ref 10–40)
AST SERPL-CCNC: 63 U/L — HIGH (ref 10–40)
BASOPHILS # BLD AUTO: 0.03 K/UL — SIGNIFICANT CHANGE UP (ref 0–0.2)
BASOPHILS # BLD AUTO: 0.03 K/UL — SIGNIFICANT CHANGE UP (ref 0–0.2)
BASOPHILS NFR BLD AUTO: 0.7 % — SIGNIFICANT CHANGE UP (ref 0–2)
BASOPHILS NFR BLD AUTO: 0.7 % — SIGNIFICANT CHANGE UP (ref 0–2)
BILIRUB SERPL-MCNC: 0.6 MG/DL — SIGNIFICANT CHANGE UP (ref 0.2–1.2)
BILIRUB SERPL-MCNC: 0.6 MG/DL — SIGNIFICANT CHANGE UP (ref 0.2–1.2)
BUN SERPL-MCNC: 9 MG/DL — SIGNIFICANT CHANGE UP (ref 7–23)
BUN SERPL-MCNC: 9 MG/DL — SIGNIFICANT CHANGE UP (ref 7–23)
CALCIUM SERPL-MCNC: 9.4 MG/DL — SIGNIFICANT CHANGE UP (ref 8.4–10.5)
CALCIUM SERPL-MCNC: 9.4 MG/DL — SIGNIFICANT CHANGE UP (ref 8.4–10.5)
CHLORIDE SERPL-SCNC: 105 MMOL/L — SIGNIFICANT CHANGE UP (ref 96–108)
CHLORIDE SERPL-SCNC: 105 MMOL/L — SIGNIFICANT CHANGE UP (ref 96–108)
CO2 SERPL-SCNC: 26 MMOL/L — SIGNIFICANT CHANGE UP (ref 22–31)
CO2 SERPL-SCNC: 26 MMOL/L — SIGNIFICANT CHANGE UP (ref 22–31)
CREAT SERPL-MCNC: 1.4 MG/DL — HIGH (ref 0.5–1.3)
CREAT SERPL-MCNC: 1.4 MG/DL — HIGH (ref 0.5–1.3)
EGFR: 53 ML/MIN/1.73M2 — LOW
EGFR: 53 ML/MIN/1.73M2 — LOW
EOSINOPHIL # BLD AUTO: 0.1 K/UL — SIGNIFICANT CHANGE UP (ref 0–0.5)
EOSINOPHIL # BLD AUTO: 0.1 K/UL — SIGNIFICANT CHANGE UP (ref 0–0.5)
EOSINOPHIL NFR BLD AUTO: 2.4 % — SIGNIFICANT CHANGE UP (ref 0–6)
EOSINOPHIL NFR BLD AUTO: 2.4 % — SIGNIFICANT CHANGE UP (ref 0–6)
GLUCOSE BLDC GLUCOMTR-MCNC: 103 MG/DL — HIGH (ref 70–99)
GLUCOSE BLDC GLUCOMTR-MCNC: 103 MG/DL — HIGH (ref 70–99)
GLUCOSE BLDC GLUCOMTR-MCNC: 114 MG/DL — HIGH (ref 70–99)
GLUCOSE BLDC GLUCOMTR-MCNC: 114 MG/DL — HIGH (ref 70–99)
GLUCOSE SERPL-MCNC: 96 MG/DL — SIGNIFICANT CHANGE UP (ref 70–99)
GLUCOSE SERPL-MCNC: 96 MG/DL — SIGNIFICANT CHANGE UP (ref 70–99)
HCT VFR BLD CALC: 36.8 % — LOW (ref 39–50)
HCT VFR BLD CALC: 36.8 % — LOW (ref 39–50)
HGB BLD-MCNC: 12 G/DL — LOW (ref 13–17)
HGB BLD-MCNC: 12 G/DL — LOW (ref 13–17)
IMM GRANULOCYTES NFR BLD AUTO: 0.2 % — SIGNIFICANT CHANGE UP (ref 0–0.9)
IMM GRANULOCYTES NFR BLD AUTO: 0.2 % — SIGNIFICANT CHANGE UP (ref 0–0.9)
LYMPHOCYTES # BLD AUTO: 1.53 K/UL — SIGNIFICANT CHANGE UP (ref 1–3.3)
LYMPHOCYTES # BLD AUTO: 1.53 K/UL — SIGNIFICANT CHANGE UP (ref 1–3.3)
LYMPHOCYTES # BLD AUTO: 37 % — SIGNIFICANT CHANGE UP (ref 13–44)
LYMPHOCYTES # BLD AUTO: 37 % — SIGNIFICANT CHANGE UP (ref 13–44)
MAGNESIUM SERPL-MCNC: 1.8 MG/DL — SIGNIFICANT CHANGE UP (ref 1.6–2.6)
MAGNESIUM SERPL-MCNC: 1.8 MG/DL — SIGNIFICANT CHANGE UP (ref 1.6–2.6)
MCHC RBC-ENTMCNC: 29 PG — SIGNIFICANT CHANGE UP (ref 27–34)
MCHC RBC-ENTMCNC: 29 PG — SIGNIFICANT CHANGE UP (ref 27–34)
MCHC RBC-ENTMCNC: 32.6 GM/DL — SIGNIFICANT CHANGE UP (ref 32–36)
MCHC RBC-ENTMCNC: 32.6 GM/DL — SIGNIFICANT CHANGE UP (ref 32–36)
MCV RBC AUTO: 88.9 FL — SIGNIFICANT CHANGE UP (ref 80–100)
MCV RBC AUTO: 88.9 FL — SIGNIFICANT CHANGE UP (ref 80–100)
MONOCYTES # BLD AUTO: 0.71 K/UL — SIGNIFICANT CHANGE UP (ref 0–0.9)
MONOCYTES # BLD AUTO: 0.71 K/UL — SIGNIFICANT CHANGE UP (ref 0–0.9)
MONOCYTES NFR BLD AUTO: 17.1 % — HIGH (ref 2–14)
MONOCYTES NFR BLD AUTO: 17.1 % — HIGH (ref 2–14)
NEUTROPHILS # BLD AUTO: 1.76 K/UL — LOW (ref 1.8–7.4)
NEUTROPHILS # BLD AUTO: 1.76 K/UL — LOW (ref 1.8–7.4)
NEUTROPHILS NFR BLD AUTO: 42.6 % — LOW (ref 43–77)
NEUTROPHILS NFR BLD AUTO: 42.6 % — LOW (ref 43–77)
NRBC # BLD: 0 /100 WBCS — SIGNIFICANT CHANGE UP (ref 0–0)
NRBC # BLD: 0 /100 WBCS — SIGNIFICANT CHANGE UP (ref 0–0)
PHOSPHATE SERPL-MCNC: 2.7 MG/DL — SIGNIFICANT CHANGE UP (ref 2.5–4.5)
PHOSPHATE SERPL-MCNC: 2.7 MG/DL — SIGNIFICANT CHANGE UP (ref 2.5–4.5)
PLATELET # BLD AUTO: 109 K/UL — LOW (ref 150–400)
PLATELET # BLD AUTO: 109 K/UL — LOW (ref 150–400)
POTASSIUM SERPL-MCNC: 3.7 MMOL/L — SIGNIFICANT CHANGE UP (ref 3.5–5.3)
POTASSIUM SERPL-MCNC: 3.7 MMOL/L — SIGNIFICANT CHANGE UP (ref 3.5–5.3)
POTASSIUM SERPL-SCNC: 3.7 MMOL/L — SIGNIFICANT CHANGE UP (ref 3.5–5.3)
POTASSIUM SERPL-SCNC: 3.7 MMOL/L — SIGNIFICANT CHANGE UP (ref 3.5–5.3)
PROT SERPL-MCNC: 6.4 G/DL — SIGNIFICANT CHANGE UP (ref 6–8.3)
PROT SERPL-MCNC: 6.4 G/DL — SIGNIFICANT CHANGE UP (ref 6–8.3)
RBC # BLD: 4.14 M/UL — LOW (ref 4.2–5.8)
RBC # BLD: 4.14 M/UL — LOW (ref 4.2–5.8)
RBC # FLD: 12.2 % — SIGNIFICANT CHANGE UP (ref 10.3–14.5)
RBC # FLD: 12.2 % — SIGNIFICANT CHANGE UP (ref 10.3–14.5)
SODIUM SERPL-SCNC: 143 MMOL/L — SIGNIFICANT CHANGE UP (ref 135–145)
SODIUM SERPL-SCNC: 143 MMOL/L — SIGNIFICANT CHANGE UP (ref 135–145)
VIT B1 SERPL-MCNC: 114.7 NMOL/L — SIGNIFICANT CHANGE UP (ref 66.5–200)
VIT B1 SERPL-MCNC: 114.7 NMOL/L — SIGNIFICANT CHANGE UP (ref 66.5–200)
WBC # BLD: 4.14 K/UL — SIGNIFICANT CHANGE UP (ref 3.8–10.5)
WBC # BLD: 4.14 K/UL — SIGNIFICANT CHANGE UP (ref 3.8–10.5)
WBC # FLD AUTO: 4.14 K/UL — SIGNIFICANT CHANGE UP (ref 3.8–10.5)
WBC # FLD AUTO: 4.14 K/UL — SIGNIFICANT CHANGE UP (ref 3.8–10.5)

## 2023-12-17 PROCEDURE — 85018 HEMOGLOBIN: CPT

## 2023-12-17 PROCEDURE — 97530 THERAPEUTIC ACTIVITIES: CPT

## 2023-12-17 PROCEDURE — 84300 ASSAY OF URINE SODIUM: CPT

## 2023-12-17 PROCEDURE — 80307 DRUG TEST PRSMV CHEM ANLYZR: CPT

## 2023-12-17 PROCEDURE — 84484 ASSAY OF TROPONIN QUANT: CPT

## 2023-12-17 PROCEDURE — 86480 TB TEST CELL IMMUN MEASURE: CPT

## 2023-12-17 PROCEDURE — 84540 ASSAY OF URINE/UREA-N: CPT

## 2023-12-17 PROCEDURE — 87484 EHRLICHA CHAFFEENSIS AMP PRB: CPT

## 2023-12-17 PROCEDURE — 85730 THROMBOPLASTIN TIME PARTIAL: CPT

## 2023-12-17 PROCEDURE — 84425 ASSAY OF VITAMIN B-1: CPT

## 2023-12-17 PROCEDURE — 87798 DETECT AGENT NOS DNA AMP: CPT

## 2023-12-17 PROCEDURE — 84100 ASSAY OF PHOSPHORUS: CPT

## 2023-12-17 PROCEDURE — 87040 BLOOD CULTURE FOR BACTERIA: CPT

## 2023-12-17 PROCEDURE — 86850 RBC ANTIBODY SCREEN: CPT

## 2023-12-17 PROCEDURE — 85610 PROTHROMBIN TIME: CPT

## 2023-12-17 PROCEDURE — 82607 VITAMIN B-12: CPT

## 2023-12-17 PROCEDURE — 76700 US EXAM ABDOM COMPLETE: CPT

## 2023-12-17 PROCEDURE — 81001 URINALYSIS AUTO W/SCOPE: CPT

## 2023-12-17 PROCEDURE — 87641 MR-STAPH DNA AMP PROBE: CPT

## 2023-12-17 PROCEDURE — 70450 CT HEAD/BRAIN W/O DYE: CPT

## 2023-12-17 PROCEDURE — 82248 BILIRUBIN DIRECT: CPT

## 2023-12-17 PROCEDURE — 86140 C-REACTIVE PROTEIN: CPT

## 2023-12-17 PROCEDURE — 84133 ASSAY OF URINE POTASSIUM: CPT

## 2023-12-17 PROCEDURE — 84132 ASSAY OF SERUM POTASSIUM: CPT

## 2023-12-17 PROCEDURE — 86900 BLOOD TYPING SEROLOGIC ABO: CPT

## 2023-12-17 PROCEDURE — G0480: CPT

## 2023-12-17 PROCEDURE — 86664 EPSTEIN-BARR NUCLEAR ANTIGEN: CPT

## 2023-12-17 PROCEDURE — 96366 THER/PROPH/DIAG IV INF ADDON: CPT

## 2023-12-17 PROCEDURE — 84295 ASSAY OF SERUM SODIUM: CPT

## 2023-12-17 PROCEDURE — 86644 CMV ANTIBODY: CPT

## 2023-12-17 PROCEDURE — 83735 ASSAY OF MAGNESIUM: CPT

## 2023-12-17 PROCEDURE — 83615 LACTATE (LD) (LDH) ENZYME: CPT

## 2023-12-17 PROCEDURE — 83935 ASSAY OF URINE OSMOLALITY: CPT

## 2023-12-17 PROCEDURE — 71045 X-RAY EXAM CHEST 1 VIEW: CPT

## 2023-12-17 PROCEDURE — 84156 ASSAY OF PROTEIN URINE: CPT

## 2023-12-17 PROCEDURE — 85045 AUTOMATED RETICULOCYTE COUNT: CPT

## 2023-12-17 PROCEDURE — 82247 BILIRUBIN TOTAL: CPT

## 2023-12-17 PROCEDURE — 85025 COMPLETE CBC W/AUTO DIFF WBC: CPT

## 2023-12-17 PROCEDURE — 80053 COMPREHEN METABOLIC PANEL: CPT

## 2023-12-17 PROCEDURE — 96365 THER/PROPH/DIAG IV INF INIT: CPT

## 2023-12-17 PROCEDURE — 82803 BLOOD GASES ANY COMBINATION: CPT

## 2023-12-17 PROCEDURE — 87521 HEPATITIS C PROBE&RVRS TRNSC: CPT

## 2023-12-17 PROCEDURE — 87389 HIV-1 AG W/HIV-1&-2 AB AG IA: CPT

## 2023-12-17 PROCEDURE — 86757 RICKETTSIA ANTIBODY: CPT

## 2023-12-17 PROCEDURE — 86618 LYME DISEASE ANTIBODY: CPT

## 2023-12-17 PROCEDURE — 36415 COLL VENOUS BLD VENIPUNCTURE: CPT

## 2023-12-17 PROCEDURE — 86803 HEPATITIS C AB TEST: CPT

## 2023-12-17 PROCEDURE — 80061 LIPID PANEL: CPT

## 2023-12-17 PROCEDURE — 82550 ASSAY OF CK (CPK): CPT

## 2023-12-17 PROCEDURE — 97161 PT EVAL LOW COMPLEX 20 MIN: CPT

## 2023-12-17 PROCEDURE — 82746 ASSAY OF FOLIC ACID SERUM: CPT

## 2023-12-17 PROCEDURE — 87640 STAPH A DNA AMP PROBE: CPT

## 2023-12-17 PROCEDURE — 82435 ASSAY OF BLOOD CHLORIDE: CPT

## 2023-12-17 PROCEDURE — 85049 AUTOMATED PLATELET COUNT: CPT

## 2023-12-17 PROCEDURE — 86665 EPSTEIN-BARR CAPSID VCA: CPT

## 2023-12-17 PROCEDURE — 82330 ASSAY OF CALCIUM: CPT

## 2023-12-17 PROCEDURE — 96361 HYDRATE IV INFUSION ADD-ON: CPT

## 2023-12-17 PROCEDURE — 80074 ACUTE HEPATITIS PANEL: CPT

## 2023-12-17 PROCEDURE — 85027 COMPLETE CBC AUTOMATED: CPT

## 2023-12-17 PROCEDURE — 87637 SARSCOV2&INF A&B&RSV AMP PRB: CPT

## 2023-12-17 PROCEDURE — 87207 SMEAR SPECIAL STAIN: CPT

## 2023-12-17 PROCEDURE — 99285 EMERGENCY DEPT VISIT HI MDM: CPT | Mod: 25

## 2023-12-17 PROCEDURE — 83605 ASSAY OF LACTIC ACID: CPT

## 2023-12-17 PROCEDURE — 87799 DETECT AGENT NOS DNA QUANT: CPT

## 2023-12-17 PROCEDURE — 82947 ASSAY GLUCOSE BLOOD QUANT: CPT

## 2023-12-17 PROCEDURE — 99239 HOSP IP/OBS DSCHRG MGMT >30: CPT | Mod: GC

## 2023-12-17 PROCEDURE — 82962 GLUCOSE BLOOD TEST: CPT

## 2023-12-17 PROCEDURE — 83690 ASSAY OF LIPASE: CPT

## 2023-12-17 PROCEDURE — 83010 ASSAY OF HAPTOGLOBIN QUANT: CPT

## 2023-12-17 PROCEDURE — 87086 URINE CULTURE/COLONY COUNT: CPT

## 2023-12-17 PROCEDURE — 83036 HEMOGLOBIN GLYCOSYLATED A1C: CPT

## 2023-12-17 PROCEDURE — 86790 VIRUS ANTIBODY NOS: CPT

## 2023-12-17 PROCEDURE — 85014 HEMATOCRIT: CPT

## 2023-12-17 PROCEDURE — 86663 EPSTEIN-BARR ANTIBODY: CPT

## 2023-12-17 PROCEDURE — 86901 BLOOD TYPING SEROLOGIC RH(D): CPT

## 2023-12-17 PROCEDURE — 82570 ASSAY OF URINE CREATININE: CPT

## 2023-12-17 PROCEDURE — 97116 GAIT TRAINING THERAPY: CPT

## 2023-12-17 PROCEDURE — 87476 LYME DIS DNA AMP PROBE: CPT

## 2023-12-17 PROCEDURE — 82140 ASSAY OF AMMONIA: CPT

## 2023-12-17 PROCEDURE — 87468 ANAPLSMA PHGCYTOPHLM AMP PRB: CPT

## 2023-12-17 PROCEDURE — 96376 TX/PRO/DX INJ SAME DRUG ADON: CPT

## 2023-12-17 PROCEDURE — 86645 CMV ANTIBODY IGM: CPT

## 2023-12-17 PROCEDURE — 84145 PROCALCITONIN (PCT): CPT

## 2023-12-17 RX ORDER — AMLODIPINE BESYLATE 2.5 MG/1
1 TABLET ORAL
Refills: 0 | DISCHARGE

## 2023-12-17 RX ORDER — DONEPEZIL HYDROCHLORIDE 10 MG/1
1 TABLET, FILM COATED ORAL
Refills: 0 | DISCHARGE

## 2023-12-17 RX ORDER — TAMSULOSIN HYDROCHLORIDE 0.4 MG/1
1 CAPSULE ORAL
Qty: 0 | Refills: 0 | DISCHARGE
Start: 2023-12-17

## 2023-12-17 RX ORDER — TAMSULOSIN HYDROCHLORIDE 0.4 MG/1
1 CAPSULE ORAL
Refills: 0 | DISCHARGE

## 2023-12-17 RX ORDER — AMLODIPINE BESYLATE 2.5 MG/1
1 TABLET ORAL
Qty: 0 | Refills: 0 | DISCHARGE
Start: 2023-12-17

## 2023-12-17 RX ORDER — FINASTERIDE 5 MG/1
1 TABLET, FILM COATED ORAL
Refills: 0 | DISCHARGE

## 2023-12-17 RX ORDER — DONEPEZIL HYDROCHLORIDE 10 MG/1
1 TABLET, FILM COATED ORAL
Qty: 0 | Refills: 0 | DISCHARGE
Start: 2023-12-17

## 2023-12-17 RX ORDER — FINASTERIDE 5 MG/1
1 TABLET, FILM COATED ORAL
Qty: 0 | Refills: 0 | DISCHARGE
Start: 2023-12-17

## 2023-12-17 RX ADMIN — POLYETHYLENE GLYCOL 3350 17 GRAM(S): 17 POWDER, FOR SOLUTION ORAL at 12:16

## 2023-12-17 RX ADMIN — Medication 1 TABLET(S): at 12:15

## 2023-12-17 RX ADMIN — AMLODIPINE BESYLATE 5 MILLIGRAM(S): 2.5 TABLET ORAL at 06:07

## 2023-12-17 RX ADMIN — HEPARIN SODIUM 5000 UNIT(S): 5000 INJECTION INTRAVENOUS; SUBCUTANEOUS at 06:07

## 2023-12-17 RX ADMIN — Medication 100 MILLIGRAM(S): at 06:07

## 2023-12-17 RX ADMIN — Medication 1 MILLIGRAM(S): at 12:15

## 2023-12-17 RX ADMIN — CHLORHEXIDINE GLUCONATE 1 APPLICATION(S): 213 SOLUTION TOPICAL at 12:16

## 2023-12-17 RX ADMIN — PANTOPRAZOLE SODIUM 40 MILLIGRAM(S): 20 TABLET, DELAYED RELEASE ORAL at 06:07

## 2023-12-17 RX ADMIN — FINASTERIDE 5 MILLIGRAM(S): 5 TABLET, FILM COATED ORAL at 12:14

## 2023-12-17 RX ADMIN — Medication 100 MILLIGRAM(S): at 12:15

## 2023-12-17 NOTE — PROGRESS NOTE ADULT - PROBLEM SELECTOR PROBLEM 2
MICHAELA (acute kidney injury)

## 2023-12-17 NOTE — DISCHARGE NOTE NURSING/CASE MANAGEMENT/SOCIAL WORK - NSDCPEFALRISK_GEN_ALL_CORE
For information on Fall & Injury Prevention, visit: [email or web address]-[email or web address] OR  [email or web address]-injury OR  https://www.cdc.gov/steadi/patient.html For information on Fall & Injury Prevention, visit: [email or web address].[email or web address]-[email or web address]-mobility OR  [email or web address].[email or web address]/[email or web address]-[email or web address]-[email or web address]-avoid-injury OR  https://www.cdc.gov/steadi/patient.html

## 2023-12-17 NOTE — PROGRESS NOTE ADULT - PROBLEM SELECTOR PLAN 6
DVT ppx: none, i/s/o thrombocytopenia   Diet: DASH,TLC  Dispo: medically active

## 2023-12-17 NOTE — PROGRESS NOTE ADULT - PROBLEM SELECTOR PLAN 4
- lipid profile wnl  - not currently on any statins
- f/u lipid profile   - not currently on any statins
- lipid profile wnl  - not currently on any statins

## 2023-12-17 NOTE — DISCHARGE NOTE NURSING/CASE MANAGEMENT/SOCIAL WORK - NSDCFUADDAPPT_GEN_ALL_CORE_FT
APPTS ARE READY TO BE MADE: [x] YES    Best Family or Patient Contact (if needed):    Additional Information about above appointments (if needed):    1: Medicine clinic - 1 week f/u   2:   3:     Other comments or requests:

## 2023-12-17 NOTE — PROGRESS NOTE ADULT - ATTENDING COMMENTS
73-year-old male with a history of hypertension hyperlipidemia, DM, EtOH use disorder, Dementia presented with generalized weakness , subjective fevers and decreased PO intake since he returned for the [facility] [place] on December 1st, Patient went to a hospital in the [place] for 1 episode of nausea and vomiting. Patient was subsequently released from the hospital and upon returning to the [facility] he went to [facility] for weakness and a fall. At that hospital he had a negative traumatic workup and was found to have acute kidney injury, He received IV fluids and his kidney function resolved.  Patient had renal ultrasounds which were unremarkable. Patient was diagnosed with transaminitis and pancytopenia likely secondary to alcohol.  On arrival, Temp 99.7, HR 86, /66, RR 16, SpO2 97% on room air. Labs remarkable for WBC 1.61, plt 49, PTT 39.8, , mg 1.3, BUN 44, creatinine 3.5, glucose 137, , . Chest xray showed clear lungs. In the ED, patient was given 1x dose of doxycycline and 1L of IVF and admitted for further evaluation and treatment   hemodynamically stable   RMSF ---> complete 14 days of Doxy as per ID     REPEAT BMP / CR within ONE week of hospital DC .  WIfe at the bedside and updated      PT  recommends DARRIAN , but family wants home --> family wants to take patient HOME TODAY (12/17)   rest is as per above/ Discussed with team   DC time 45mns   F/up with PCP as outpt     Lexis Johnson   Hospitalist   available on TEAMS 73-year-old male with a history of hypertension hyperlipidemia, DM, EtOH use disorder, Dementia presented with generalized weakness , subjective fevers and decreased PO intake since he returned for the [facility] [place] on December 1st, Patient went to a hospital in the [place] for 1 episode of nausea and vomiting. Patient was subsequently released from the hospital and upon returning to the [facility] he went to [facility] for weakness and a fall. At that hospital he had a negative traumatic workup and was found to have acute kidney injury, He received IV fluids and his kidney function resolved.  Patient had renal ultrasounds which were unremarkable. Patient was diagnosed with transaminitis and pancytopenia likely secondary to alcohol.  On arrival, Temp 99.7, HR 86, /66, RR 16, SpO2 97% on room air. Labs remarkable for WBC 1.61, plt 49, PTT 39.8, , mg 1.3, BUN 44, creatinine 3.5, glucose 137, , . Chest xray showed clear lungs. In the ED, patient was given 1x dose of doxycycline and 1L of IVF and admitted for further evaluation and treatment   hemodynamically stable   RMSF ---> complete 14 days of Doxy as per ID     REPEAT BMP / CR within ONE week of hospital DC .  WIfe at the bedside and updated      PT  recommends DARRIAN , but family wants home --> family wants to take patient HOME TODAY (12/17)   rest is as per above/ Discussed with team   DC time 45mns   F/up with PCP as outpt     Lexis Johnson   Hospitalist   available on TEAMS

## 2023-12-17 NOTE — PROGRESS NOTE ADULT - PROBLEM/PLAN-1
Attempted to contact patient on 7/5/2018. Result: left message on the patient's voicemail asking patient to return my call. Pre-Visit planning not completed.
DISPLAY PLAN FREE TEXT

## 2023-12-17 NOTE — PROGRESS NOTE ADULT - SUBJECTIVE AND OBJECTIVE BOX
Patient is a 73y old  Male who presents with a chief complaint of MICHAELA, elevated LFTs (15 Dec 2023 15:01)      SUBJECTIVE / OVERNIGHT EVENTS::  No acute overnight events. Pt seen and examined. Denies fevers, chills, CP, SOB, Abdominal pain, N/V, Constipation, Diarrhea      =================Meds=================  MEDICATIONS  (STANDING):  amLODIPine   Tablet 5 milliGRAM(s) Oral daily  atorvastatin 10 milliGRAM(s) Oral at bedtime  chlorhexidine 4% Liquid 1 Application(s) Topical daily  dextrose 5%. 1000 milliLiter(s) (50 mL/Hr) IV Continuous <Continuous>  dextrose 5%. 1000 milliLiter(s) (100 mL/Hr) IV Continuous <Continuous>  dextrose 50% Injectable 25 Gram(s) IV Push once  dextrose 50% Injectable 12.5 Gram(s) IV Push once  dextrose 50% Injectable 25 Gram(s) IV Push once  donepezil 5 milliGRAM(s) Oral at bedtime  doxycycline monohydrate Capsule 100 milliGRAM(s) Oral every 12 hours  finasteride 5 milliGRAM(s) Oral daily  folic acid 1 milliGRAM(s) Oral daily  glucagon  Injectable 1 milliGRAM(s) IntraMuscular once  insulin lispro (ADMELOG) corrective regimen sliding scale   SubCutaneous three times a day before meals  lactated ringers. 1000 milliLiter(s) (75 mL/Hr) IV Continuous <Continuous>  multivitamin 1 Tablet(s) Oral daily  pantoprazole    Tablet 40 milliGRAM(s) Oral before breakfast  polyethylene glycol 3350 17 Gram(s) Oral daily  senna 2 Tablet(s) Oral at bedtime  tamsulosin 0.4 milliGRAM(s) Oral at bedtime  thiamine 100 milliGRAM(s) Oral daily    MEDICATIONS  (PRN):  acetaminophen     Tablet .. 650 milliGRAM(s) Oral every 6 hours PRN Temp greater or equal to 38C (100.4F)  dextrose Oral Gel 15 Gram(s) Oral once PRN Blood Glucose LESS THAN 70 milliGRAM(s)/deciliter      I&O's Summary    15 Dec 2023 07:01  -  16 Dec 2023 07:00  --------------------------------------------------------  IN: 350 mL / OUT: 1850 mL / NET: -1500 mL        =================Vitals=================  Vital Signs Last 24 Hrs  T(C): 37.2 (16 Dec 2023 04:49), Max: 37.4 (15 Dec 2023 21:54)  T(F): 99 (16 Dec 2023 04:49), Max: 99.4 (15 Dec 2023 21:54)  HR: 73 (16 Dec 2023 04:49) (67 - 77)  BP: 140/72 (16 Dec 2023 04:49) (130/75 - 142/71)  BP(mean): --  RR: 18 (16 Dec 2023 04:49) (18 - 18)  SpO2: 98% (16 Dec 2023 04:49) (95% - 98%)    Parameters below as of 16 Dec 2023 04:49  Patient On (Oxygen Delivery Method): room air        =================PHYSICAL EXAM=================  GENERAL: Laying comfortably, NAD  EYES: EOMI, PERRL, no scleral icterus  NECK: No JVD  LUNG: Clear to auscultation bilaterally; No wheeze, crackles or rhonci  HEART: Regular rate and rhythm; No murmurs, rubs, or gallops  ABDOMEN: Soft, Nontender, Nondistended  EXTREMITIES:  No LE edema, 2+ Peripheral Pulses, No clubbing, cyanosis, or edema  PSYCH: AAOx3  NEUROLOGY: non-focal, strength 5/5 in all extremities, sensation intact  SKIN: No rashes or lesions      ===================LABS=======================                        12.2   4.02  )-----------( 90       ( 16 Dec 2023 07:10 )             35.7     Auto Eosinophil # 0.11  / Auto Eosinophil % 2.7   / Auto Neutrophil # 1.84  / Auto Neutrophil % 45.8  / BANDS % x                            12.4   3.33  )-----------( 73       ( 15 Dec 2023 11:22 )             36.7     Auto Eosinophil # 0.08  / Auto Eosinophil % 2.5   / Auto Neutrophil # 1.57  / Auto Neutrophil % 47.1  / BANDS % x        12-15    140  |  104  |  10  ----------------------------<  115<H>  3.5   |  29  |  1.34<H>    Ca    8.9      15 Dec 2023 11:21  Mg     1.3     12-15  Phos  2.6     12-15  TPro  6.4  /  Alb  3.3  /  TBili  0.5  /  DBili  x   /  AST  106<H>  /  ALT  89<H>  /  AlkPhos  39<L>  12-15          Urinalysis Basic - ( 15 Dec 2023 11:21 )    Color: x / Appearance: x / SG: x / pH: x  Gluc: 115 mg/dL / Ketone: x  / Bili: x / Urobili: x   Blood: x / Protein: x / Nitrite: x   Leuk Esterase: x / RBC: x / WBC x   Sq Epi: x / Non Sq Epi: x / Bacteria: x          ===============Radiology & Additional Tests==================  Reviewed.           Patient is a 73y old  Male who presents with a chief complaint of MICHAELA, elevated LFTs (15 Dec 2023 15:01)      SUBJECTIVE / OVERNIGHT EVENTS::  No acute overnight events. Pt seen and examined. Denies fevers, chills, CP, SOB, Abdominal pain, N/V, Constipation, Diarrhea      =================Meds=================  MEDICATIONS  (STANDING):  amLODIPine   Tablet 5 milliGRAM(s) Oral daily  atorvastatin 10 milliGRAM(s) Oral at bedtime  chlorhexidine 4% Liquid 1 Application(s) Topical daily  dextrose 5%. 1000 milliLiter(s) (50 mL/Hr) IV Continuous <Continuous>  dextrose 5%. 1000 milliLiter(s) (100 mL/Hr) IV Continuous <Continuous>  dextrose 50% Injectable 25 Gram(s) IV Push once  dextrose 50% Injectable 12.5 Gram(s) IV Push once  dextrose 50% Injectable 25 Gram(s) IV Push once  donepezil 5 milliGRAM(s) Oral at bedtime  doxycycline monohydrate Capsule 100 milliGRAM(s) Oral every 12 hours  finasteride 5 milliGRAM(s) Oral daily  folic acid 1 milliGRAM(s) Oral daily  glucagon  Injectable 1 milliGRAM(s) IntraMuscular once  insulin lispro (ADMELOG) corrective regimen sliding scale   SubCutaneous three times a day before meals  lactated ringers. 1000 milliLiter(s) (75 mL/Hr) IV Continuous <Continuous>  multivitamin 1 Tablet(s) Oral daily  pantoprazole    Tablet 40 milliGRAM(s) Oral before breakfast  polyethylene glycol 3350 17 Gram(s) Oral daily  senna 2 Tablet(s) Oral at bedtime  tamsulosin 0.4 milliGRAM(s) Oral at bedtime  thiamine 100 milliGRAM(s) Oral daily    MEDICATIONS  (PRN):  acetaminophen     Tablet .. 650 milliGRAM(s) Oral every 6 hours PRN Temp greater or equal to 38C (100.4F)  dextrose Oral Gel 15 Gram(s) Oral once PRN Blood Glucose LESS THAN 70 milliGRAM(s)/deciliter      I&O's Summary    15 Dec 2023 07:01  -  16 Dec 2023 07:00  --------------------------------------------------------  IN: 350 mL / OUT: 1850 mL / NET: -1500 mL        =================Vitals=================  Vital Signs Last 24 Hrs  T(C): 37.1 (17 Dec 2023 05:30), Max: 37.1 (16 Dec 2023 21:39)  T(F): 98.7 (17 Dec 2023 05:30), Max: 98.8 (16 Dec 2023 21:39)  HR: 75 (17 Dec 2023 05:30) (71 - 75)  BP: 107/55 (17 Dec 2023 05:30) (107/55 - 122/73)  BP(mean): --  RR: 18 (17 Dec 2023 05:30) (18 - 18)  SpO2: 97% (17 Dec 2023 05:30) (97% - 97%)    Parameters below as of 17 Dec 2023 05:30  Patient On (Oxygen Delivery Method): room air          =================PHYSICAL EXAM=================  GENERAL: Laying comfortably, NAD  EYES: EOMI, PERRL, no scleral icterus  NECK: No JVD  LUNG: Clear to auscultation bilaterally; No wheeze, crackles or rhonci  HEART: Regular rate and rhythm; No murmurs, rubs, or gallops  ABDOMEN: Soft, Nontender, Nondistended  EXTREMITIES:  No LE edema, 2+ Peripheral Pulses, No clubbing, cyanosis, or edema  PSYCH: AAOx3  NEUROLOGY: non-focal, strength 5/5 in all extremities, sensation intact  SKIN: No rashes or lesions      ===================LABS=======================    CBC Full  -  ( 17 Dec 2023 07:30 )  WBC Count : 4.14 K/uL  RBC Count : 4.14 M/uL  Hemoglobin : 12.0 g/dL  Hematocrit : 36.8 %  Platelet Count - Automated : 109 K/uL  Mean Cell Volume : 88.9 fl  Mean Cell Hemoglobin : 29.0 pg  Mean Cell Hemoglobin Concentration : 32.6 gm/dL  Auto Neutrophil # : 1.76 K/uL  Auto Lymphocyte # : 1.53 K/uL  Auto Monocyte # : 0.71 K/uL  Auto Eosinophil # : 0.10 K/uL  Auto Basophil # : 0.03 K/uL  Auto Neutrophil % : 42.6 %  Auto Lymphocyte % : 37.0 %  Auto Monocyte % : 17.1 %  Auto Eosinophil % : 2.4 %  Auto Basophil % : 0.7 %    12-17    143  |  105  |  9   ----------------------------<  96  3.7   |  26  |  1.40<H>    Ca    9.4      17 Dec 2023 07:30  Phos  2.7     12-17  Mg     1.8     12-17    TPro  6.4  /  Alb  3.2<L>  /  TBili  0.6  /  DBili  x   /  AST  63<H>  /  ALT  64<H>  /  AlkPhos  37<L>  12-17          Urinalysis Basic - ( 15 Dec 2023 11:21 )    Color: x / Appearance: x / SG: x / pH: x  Gluc: 115 mg/dL / Ketone: x  / Bili: x / Urobili: x   Blood: x / Protein: x / Nitrite: x   Leuk Esterase: x / RBC: x / WBC x   Sq Epi: x / Non Sq Epi: x / Bacteria: x          ===============Radiology & Additional Tests==================  Reviewed.

## 2023-12-17 NOTE — DISCHARGE NOTE NURSING/CASE MANAGEMENT/SOCIAL WORK - PATIENT PORTAL LINK FT
You can access the FollowMyHealth Patient Portal offered by [facility] by registering at the following website: [email or web address]. By joining [facility]’s FollowMyHealth portal, you will also be able to view your health information using other applications (apps) compatible with our system. You can access the FollowMyHealth Patient Portal offered by [facility] by registering at the following website: [email or web address]. By joining [employer]’s FollowMyHealth portal, you will also be able to view your health information using other applications (apps) compatible with our system.

## 2023-12-17 NOTE — PROGRESS NOTE ADULT - PROBLEM SELECTOR PLAN 5
- continue with finasteride and tamsulosin

## 2023-12-17 NOTE — PROGRESS NOTE ADULT - REASON FOR ADMISSION
MICHAELA, elevated LFTs

## 2023-12-17 NOTE — PROGRESS NOTE ADULT - NUTRITIONAL ASSESSMENT
This patient has been assessed with a concern for Malnutrition and has been determined to have a diagnosis/diagnoses of Severe protein-calorie malnutrition.    This patient is being managed with:   Diet Consistent Carbohydrate w/Evening Snack-  Low Sodium  Supplement Feeding Modality:  Oral  Ensure Plus High Protein Cans or Servings Per Day:  1       Frequency:  Three Times a day  Entered: Dec 13 2023  2:15PM  

## 2023-12-17 NOTE — DISCHARGE NOTE NURSING/CASE MANAGEMENT/SOCIAL WORK - NSDCDMETYPESERV_GEN_ALL_CORE_FT
For transport wheelchair, commode and transfer tub bench to be delivered to home pending approval. Please call to follow up on approval and delivery status.

## 2023-12-17 NOTE — SBIRT NOTE ADULT - NSSBIRTUNABLESCROTHER_GEN_A_CORE
SW met with pt at bedside to complete SBIRT, due to pt having a hx of ETOH use. Lithuanian int: Jana [id number].Pt declined consult and resources. Pt reported struggling in the past but reports no longer struggling with ETOH use.  SW met with pt at bedside to complete SBIRT, due to pt having a hx of ETOH use. Kinyarwanda int: Jana [id number].Pt declined consult and resources. Pt reported struggling in the past but reports no longer struggling with ETOH use.

## 2023-12-18 LAB
PETH 16:0/18:1: >400 NG/ML — HIGH
PETH 16:0/18:1: >400 NG/ML — HIGH
PETH 16:0/18:2: 167 NG/ML — HIGH
PETH 16:0/18:2: 167 NG/ML — HIGH
PETH COMMENTS: SIGNIFICANT CHANGE UP
PETH COMMENTS: SIGNIFICANT CHANGE UP

## 2023-12-19 LAB
DENV IGG+IGM PNL SER IA: 4.67 ISR — HIGH
DENV IGG+IGM PNL SER IA: 4.67 ISR — HIGH
DENV IGM SER-ACNC: 1.49 ISR — SIGNIFICANT CHANGE UP
DENV IGM SER-ACNC: 1.49 ISR — SIGNIFICANT CHANGE UP

## 2023-12-20 PROBLEM — F10.11 ALCOHOL ABUSE, IN REMISSION: Chronic | Status: ACTIVE | Noted: 2023-12-10

## 2024-01-24 ENCOUNTER — APPOINTMENT (OUTPATIENT)
Dept: INFECTIOUS DISEASE | Facility: CLINIC | Age: 74
End: 2024-01-24

## 2024-01-24 PROBLEM — Z00.00 ENCOUNTER FOR PREVENTIVE HEALTH EXAMINATION: Status: ACTIVE | Noted: 2024-01-24

## 2024-05-31 NOTE — PATIENT PROFILE ADULT - NSTRANSFERBELONGINGSDISPO_GEN_A_NUR
Check-In Place: [facility]- [address] [place] [address]- Enter at [facility] Entrance, STOP at first floor reception desk to check in.      Surgeon: Dr. Beto Singh  Procedure:SI Joint Injection [02644]  Anesthesia: Local    Directions:     STOP Aspirin 7 days before surgery.   Do not take any NSAIDS 24 hours before procedure: examples: Advil, Aleve, Ibuprofen, or Motrin  You may eat and drink prior to your procedure.  You can drive yourself.  Post Op Care: Your post-operative follow-up appointment is scheduled in the office.  You may need help following your procedure. Please contact family member(s) or friend(s) to assist you with day-to-day tasks.      PROCEDURE PERFORMED: SACROILIAC JOINT INJECTION     DIET: There are no dietary restrictions related to the procedure.     ACTIVITY: Resume light activity as instructed, If there are any activity restrictions, we will inform you.        DRIVING: You can drive yourself unless you are taking a relaxant medication (Valium/Diazepam).    PROCEDURE AREA CARE: Keep the incision dry for 24 hours. After 24 hours you may shower.  It is not unusual for the incision area to look bruised or swollen. No baths, swimming, or soaking the incision for 14 days after surgery.    FOLLOW-UP VISIT: Please refer to the pre-surgical instructions you received for the date and time.  Should you need to reschedule, please call the office.    CALL FOR: Fever of 101 degrees or higher, increasing pain, persistent vomiting or diarrhea, increasing redness of the incision, or any other concerns.    
with patient

## 2025-02-19 NOTE — ED ADULT NURSE NOTE - TEMPLATE LIST FOR HEAD TO TOE ASSESSMENT
Refer him to a pulmonologist he just got a refill a month ago for his albuterol he should not be using that much albuterol a refill was sent again but he does need to get evaluated with a specialist.   General

## 2025-07-18 NOTE — ED ADULT NURSE NOTE - JUGULAR VENOUS DISTENTION
Believe seeing psych> if so they manage if not can resume at prior dosages  
ELIN low K+- see NephroSTAT take 40 MEQ KLORCON x 1 and repeat bmp in 1 week  
Per chart review, patient has scheduled with nephrologist on 8/7/2025 @ 1:45 PM at [facility].    Writer called and spoke to patient regarding PCP recommendations. Writer was able to confirm with patient that he is seeing psych. Informed patient to reach out to  first as they are the ones managing his current psych meds. Explained to patient it is important for  to know that current treatment is not working for him so that they can make adjustments or changes to his psych meds. Patient states he previously had a schedule last May and is aware he needs to reschedule. Patient states he will call . Writer informed patient to call clinic back if there are further concerns.     Patient verbalized understanding. No further questions at this time.  
Writer called and spoke to patient regarding recent lab test results. Informed patient PCP has seen his results and mentions he has ELIN and low potassium levels. The following recommendations have been relayed to patients:  See Nephro STAT - Patient states he is not surprised about his kidney function result. States he is seeing Dr. Huan Gifford (per chart review, last visit with Dr. Gifford was 10/24/2024 at [facility]). Informed patient writer will inquire with Dr. Gifford's office tomorrow and see if they need an updated referral from clinic.  Hypokalemia - Informed patient PCP advises 40 MEQ KLORCON x 1 dose. Script sent to preferred pharmacy. Also informed patient to repeat BMP test in 1 week. Order for BMP placed.    Patient is also requesting to be put back on Cymbalta and Abilify. Patient states he is currently on hydroxyzine for anxiety and mirtazipine for depression but he feels it's not working for him. Informed patient will route concern to provider and will update him with response.  
absent